# Patient Record
Sex: FEMALE | Race: ASIAN | Employment: OTHER | URBAN - METROPOLITAN AREA
[De-identification: names, ages, dates, MRNs, and addresses within clinical notes are randomized per-mention and may not be internally consistent; named-entity substitution may affect disease eponyms.]

---

## 2018-05-09 ENCOUNTER — HOSPITAL ENCOUNTER (EMERGENCY)
Age: 38
Discharge: HOME | End: 2018-05-09
Attending: EMERGENCY MEDICINE
Payer: MEDICARE

## 2018-05-09 VITALS
BODY MASS INDEX: 25.06 KG/M2 | DIASTOLIC BLOOD PRESSURE: 121 MMHG | HEART RATE: 96 BPM | WEIGHT: 185 LBS | HEIGHT: 72 IN | RESPIRATION RATE: 16 BRPM | SYSTOLIC BLOOD PRESSURE: 173 MMHG | OXYGEN SATURATION: 100 % | TEMPERATURE: 98.9 F

## 2018-05-09 DIAGNOSIS — S46.819A STRAIN OF TRAPEZIUS MUSCLE, UNSPECIFIED LATERALITY, INITIAL ENCOUNTER: Primary | ICD-10-CM
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
## 2018-05-09 PROCEDURE — 99282 EMERGENCY DEPT VISIT SF MDM: CPT
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
## 2018-06-04 ENCOUNTER — OFFICE VISIT (OUTPATIENT)
Dept: FAMILY MEDICINE CLINIC | Age: 38
End: 2018-06-04

## 2018-06-04 VITALS
RESPIRATION RATE: 20 BRPM | HEIGHT: 72 IN | OXYGEN SATURATION: 98 % | TEMPERATURE: 99.2 F | SYSTOLIC BLOOD PRESSURE: 145 MMHG | DIASTOLIC BLOOD PRESSURE: 101 MMHG | HEART RATE: 18 BPM | BODY MASS INDEX: 24.79 KG/M2 | WEIGHT: 183 LBS

## 2018-06-04 DIAGNOSIS — R39.15 URGENCY OF MICTURITION: ICD-10-CM

## 2018-06-04 DIAGNOSIS — E03.2 HYPOTHYROIDISM DUE TO NON-MEDICATION EXOGENOUS SUBSTANCES: ICD-10-CM

## 2018-06-04 DIAGNOSIS — E55.9 HYPOVITAMINOSIS D: ICD-10-CM

## 2018-06-04 DIAGNOSIS — I10 ESSENTIAL HYPERTENSION: Primary | ICD-10-CM

## 2018-06-04 DIAGNOSIS — F17.210 NICOTINE DEPENDENCE, CIGARETTES, UNCOMPLICATED: ICD-10-CM

## 2018-06-04 DIAGNOSIS — M51.36 DDD (DEGENERATIVE DISC DISEASE), LUMBAR: ICD-10-CM

## 2018-06-04 DIAGNOSIS — R53.83 FATIGUE, UNSPECIFIED TYPE: ICD-10-CM

## 2018-06-04 DIAGNOSIS — F32.A DEPRESSION, UNSPECIFIED DEPRESSION TYPE: ICD-10-CM

## 2018-06-04 DIAGNOSIS — Z79.899 ENCOUNTER FOR LONG-TERM (CURRENT) USE OF MEDICATIONS: ICD-10-CM

## 2018-06-04 DIAGNOSIS — E11.65 TYPE 2 DIABETES MELLITUS WITH HYPERGLYCEMIA, WITHOUT LONG-TERM CURRENT USE OF INSULIN (HCC): ICD-10-CM

## 2018-06-04 PROBLEM — E03.9 HYPOTHYROID: Status: ACTIVE | Noted: 2018-06-04

## 2018-06-04 LAB
BILIRUB UR QL STRIP: NEGATIVE
GLUCOSE UR-MCNC: NEGATIVE MG/DL
KETONES P FAST UR STRIP-MCNC: NEGATIVE MG/DL
PH UR STRIP: 5.5 [PH] (ref 4.6–8)
PROT UR QL STRIP: NEGATIVE
SP GR UR STRIP: 1.02 (ref 1–1.03)
UA UROBILINOGEN AMB POC: NORMAL (ref 0.2–1)
URINALYSIS CLARITY POC: CLEAR
URINALYSIS COLOR POC: YELLOW
URINE BLOOD POC: NEGATIVE
URINE LEUKOCYTES POC: NEGATIVE
URINE NITRITES POC: NEGATIVE

## 2018-06-04 RX ORDER — LISINOPRIL AND HYDROCHLOROTHIAZIDE 12.5; 2 MG/1; MG/1
1 TABLET ORAL DAILY
Qty: 30 TAB | Refills: 0 | Status: SHIPPED | OUTPATIENT
Start: 2018-06-04 | End: 2018-11-02 | Stop reason: SDUPTHER

## 2018-06-04 RX ORDER — ASPIRIN 500 MG
500 TABLET, DELAYED RELEASE (ENTERIC COATED) ORAL
COMMUNITY
End: 2018-11-02 | Stop reason: ALTCHOICE

## 2018-06-04 RX ORDER — LEVOTHYROXINE SODIUM 175 UG/1
175 TABLET ORAL
Qty: 30 TAB | Refills: 0 | Status: SHIPPED | OUTPATIENT
Start: 2018-06-04 | End: 2018-07-20 | Stop reason: SDUPTHER

## 2018-06-04 RX ORDER — LISINOPRIL 10 MG/1
10 TABLET ORAL DAILY
Qty: 30 TAB | Refills: 0 | Status: SHIPPED | OUTPATIENT
Start: 2018-06-04 | End: 2018-06-04 | Stop reason: CLARIF

## 2018-06-04 RX ORDER — IBUPROFEN 200 MG
TABLET ORAL
COMMUNITY
End: 2018-11-02 | Stop reason: DRUGHIGH

## 2018-06-04 NOTE — MR AVS SNAPSHOT
[address] [place] [id number] 
[phone] Patient: Adriel Gambino MRN: [id number] QNY:[birth year] Visit Information Date & Time Provider Department Dept. Phone Encounter #  
 6/4/2018  4:15  [address] [facility] [phone] [id number] Follow-up Instructions Return in about 1 week (around 6/11/2018) for to review labs. Upcoming Health Maintenance Date Due Pneumococcal 19-64 Medium Risk (1 of 1 - PPSV23) 9/3/1997 DTaP/Tdap/Td series (1 - Tdap) 9/3/1999 PAP AKA CERVICAL CYTOLOGY 9/3/1999 MEDICARE YEARLY EXAM 4/27/2018 Influenza Age 5 to Adult 8/1/2018 Allergies as of 6/4/2018  Review Complete On: 6/4/2018 By: Alan Jauregui Severity Noted Reaction Type Reactions Tramadol  04/27/2018    Nausea and Vomiting Current Immunizations  Never Reviewed No immunizations on file. Not reviewed this visit You Were Diagnosed With   
  
 Codes Comments Essential hypertension    -  Primary ICD-10-CM: I10 
ICD-9-CM: 401.9 Hypothyroidism due to non-medication exogenous substances     ICD-10-CM: E03.2 ICD-9-CM: 483. 3 Type 2 diabetes mellitus with hyperglycemia, without long-term current use of insulin (HCC)     ICD-10-CM: E11.65 ICD-9-CM: 250.00, 790.29 Nicotine dependence, cigarettes, uncomplicated     JHON-57-LB: F17.210 ICD-9-CM: 305.1 Depression, unspecified depression type     ICD-10-CM: F32.9 ICD-9-CM: 938 DDD (degenerative disc disease), lumbar     ICD-10-CM: M51.36 
ICD-9-CM: 722.52 Fatigue, unspecified type     ICD-10-CM: R53.83 ICD-9-CM: 780.79 Urgency of micturition     ICD-10-CM: R39.15 ICD-9-CM: 430.63 Encounter for long-term (current) use of medications     ICD-10-CM: Z79.899 ICD-9-CM: V58.69 Hypovitaminosis D     ICD-10-CM: E55.9 ICD-9-CM: 268.9 Vitals BP Pulse Temp Resp Height(growth percentile) Weight(growth percentile) (!) 145/101 (BP 1 Location: Left arm, BP Patient Position: Sitting) (!) 18 99.2 °F (37.3 °C) (Oral) 20 6' 1\" (1.854 m) 183 lb (83 kg) SpO2 BMI OB Status Smoking Status 98% 24.14 kg/m2 Hysterectomy Current Every Day Smoker BMI and BSA Data Body Mass Index Body Surface Area  
 24.14 kg/m 2 2.07 m 2 Preferred Pharmacy Pharmacy Name Phone [address] [facility], [address] [phone] Your Updated Medication List  
  
   
This list is accurate as of 6/4/18  5:22 PM.  Always use your most recent med list.  
  
  
  
  
 aspirin  mg tablet Take 500 mg by mouth every six (6) hours as needed for Pain. ibuprofen 200 mg tablet Commonly known as:  MOTRIN Take  by mouth.  
  
 levothyroxine 175 mcg tablet Commonly known as:  SYNTHROID Take 1 Tab by mouth Daily (before breakfast). lisinopril-hydroCHLOROthiazide 20-12.5 mg per tablet Commonly known as:  Anton Maries Take 1 Tab by mouth daily. methocarbamol 750 mg tablet Commonly known as:  ROBAXIN Take 2 Tabs by mouth four (4) times daily. Indications: Muscle Spasm Prescriptions Sent to Pharmacy Refills  
 levothyroxine (SYNTHROID) 175 mcg tablet 0 Sig: Take 1 Tab by mouth Daily (before breakfast). Class: Normal  
 Pharmacy: [address], [address] [facility] Ph #: [phone] Route: Oral  
 lisinopril-hydroCHLOROthiazide (PRINZIDE, ZESTORETIC) 20-12.5 mg per tablet 0 Sig: Take 1 Tab by mouth daily. Class: Normal  
 Pharmacy: [address] [facility] [address] [place] [address], [address] [facility] Ph #: [phone] Route: Oral  
  
We Performed the Following AMB POC URINALYSIS DIP STICK AUTO W/ MICRO [22231 CPT(R)] Follow-up Instructions Return in about 1 week (around 6/11/2018) for to review labs. To-Do List   
 06/04/2018 Lab:  CBC WITH AUTOMATED DIFF   
  
 06/04/2018 Lab:  HEMOGLOBIN A1C WITH EAG   
  
 06/04/2018 Lab:  LIPID PANEL   
  
 06/04/2018 Lab:  METABOLIC PANEL, COMPREHENSIVE   
  
 06/04/2018 Lab:  MICROALBUMIN, UR, RAND W/ MICROALB/CREAT RATIO   
  
 06/04/2018 Lab:  T4, FREE   
  
 06/04/2018 Lab:  TSH 3RD GENERATION   
  
 06/04/2018 Lab:  VITAMIN B12   
  
 06/04/2018 Lab:  VITAMIN D, 25 HYDROXY Patient Instructions Learning About the [address] [facility] [place] Diet What is the Mediterranean diet? The Mediterranean diet is a style of eating rather than a diet plan. It features foods eaten in [place], [place], [place] and [place], and other countries along the [facility]. It emphasizes eating foods like fish, fruits, vegetables, beans, high-fiber breads and whole grains, nuts, and olive oil. This style of eating includes limited red meat, cheese, and sweets. Why choose the Mediterranean diet? A Mediterranean-style diet may improve heart health. It contains more fat than other heart-healthy diets. But the fats are mainly from nuts, unsaturated oils (such as fish oils and olive oil), and certain nut or seed oils (such as canola, soybean, or flaxseed oil). These fats may help protect the heart and blood vessels. How can you get started on the Mediterranean diet? Here are some things you can do to switch to a more Mediterranean way of eating. What to eat · Eat a variety of fruits and vegetables each day, such as grapes, blueberries, tomatoes, broccoli, peppers, figs, olives, spinach, eggplant, beans, lentils, and chickpeas. · Eat a variety of whole-grain foods each day, such as oats, brown rice, and whole wheat bread, pasta, and couscous. · Eat fish at least 2 times a week. Try tuna, salmon, mackerel, lake trout, herring, or sardines. · Eat moderate amounts of low-fat dairy products, such as milk, cheese, or yogurt. · Eat moderate amounts of poultry and eggs. · Choose healthy (unsaturated) fats, such as nuts, olive oil, and certain nut or seed oils like canola, soybean, and flaxseed. · Limit unhealthy (saturated) fats, such as butter, palm oil, and coconut oil. And limit fats found in animal products, such as meat and dairy products made with whole milk. Try to eat red meat only a few times a month in very small amounts. · Limit sweets and desserts to only a few times a week. This includes sugar-sweetened drinks like soda. The Mediterranean diet may also include red wine with your meal-1 glass each day for women and up to 2 glasses a day for men. Tips for eating at home · Use herbs, spices, garlic, lemon zest, and citrus juice instead of salt to add flavor to foods. · Add avocado slices to your sandwich instead of juares. · Have fish for lunch or dinner instead of red meat. Brush the fish with olive oil, and broil or grill it. · Sprinkle your salad with seeds or nuts instead of cheese. · Cook with olive or canola oil instead of butter or oils that are high in saturated fat. · Switch from 2% milk or whole milk to 1% or fat-free milk. · Dip raw vegetables in a vinaigrette dressing or hummus instead of dips made from mayonnaise or sour cream. 
· Have a piece of fruit for dessert instead of a piece of cake. Try baked apples, or have some dried fruit. Tips for eating out · Try broiled, grilled, baked, or poached fish instead of having it fried or breaded. · Ask your  to have your meals prepared with olive oil instead of butter. · Order dishes made with marinara sauce or sauces made from olive oil. Avoid sauces made from cream or mayonnaise. · Choose whole-grain breads, whole wheat pasta and pizza crust, brown rice, beans, and lentils. · Cut back on butter or margarine on bread.  Instead, you can dip your bread in a small amount of olive oil. · Ask for a side salad or grilled vegetables instead of french fries or chips. Where can you learn more? Go to http://ranjan-luis.info/. Enter 291-301-0496 in the search box to learn more about \"Learning About the Mediterranean Diet. \" Current as of: May 12, 2017 Content Version: 11.4 © 1001-1912 [employer]. Care instructions adapted under license by [employer] (which disclaims liability or warranty for this information). If you have questions about a medical condition or this instruction, always ask your healthcare professional. [place] [address] any warranty or liability for your use of this information. DASH Diet: Care Instructions Your Care Instructions The DASH diet is an eating plan that can help lower your blood pressure. DASH stands for Dietary Approaches to Stop Hypertension. Hypertension is high blood pressure. The DASH diet focuses on eating foods that are high in calcium, potassium, and magnesium. These nutrients can lower blood pressure. The foods that are highest in these nutrients are fruits, vegetables, low-fat dairy products, nuts, seeds, and legumes. But taking calcium, potassium, and magnesium supplements instead of eating foods that are high in those nutrients does not have the same effect. The DASH diet also includes whole grains, fish, and poultry. The DASH diet is one of several lifestyle changes your doctor may recommend to lower your high blood pressure. Your doctor may also want you to decrease the amount of sodium in your diet. Lowering sodium while following the DASH diet can lower blood pressure even further than just the DASH diet alone. Follow-up care is a key part of your treatment and safety. Be sure to make and go to all appointments, and call your doctor if you are having problems. It's also a good idea to know your test results and keep a list of the medicines you take. How can you care for yourself at home? Following the DASH diet · Eat 4 to 5 servings of fruit each day. A serving is 1 medium-sized piece of fruit, ½ cup chopped or canned fruit, 1/4 cup dried fruit, or 4 ounces (½ cup) of fruit juice. Choose fruit more often than fruit juice. · Eat 4 to 5 servings of vegetables each day. A serving is 1 cup of lettuce or raw leafy vegetables, ½ cup of chopped or cooked vegetables, or 4 ounces (½ cup) of vegetable juice. Choose vegetables more often than vegetable juice. · Get 2 to 3 servings of low-fat and fat-free dairy each day. A serving is 8 ounces of milk, 1 cup of yogurt, or 1 ½ ounces of cheese. · Eat 6 to 8 servings of grains each day. A serving is 1 slice of bread, 1 ounce of dry cereal, or ½ cup of cooked rice, pasta, or cooked cereal. Try to choose whole-grain products as much as possible. · Limit lean meat, poultry, and fish to 2 servings each day. A serving is 3 ounces, about the size of a deck of cards. · Eat 4 to 5 servings of nuts, seeds, and legumes (cooked dried beans, lentils, and split peas) each week. A serving is 1/3 cup of nuts, 2 tablespoons of seeds, or ½ cup of cooked beans or peas. · Limit fats and oils to 2 to 3 servings each day. A serving is 1 teaspoon of vegetable oil or 2 tablespoons of salad dressing. · Limit sweets and added sugars to 5 servings or less a week. A serving is 1 tablespoon jelly or jam, ½ cup sorbet, or 1 cup of lemonade. · Eat less than 2,300 milligrams (mg) of sodium a day. If you limit your sodium to 1,500 mg a day, you can lower your blood pressure even more. Tips for success · Start small. Do not try to make dramatic changes to your diet all at once. You might feel that you are missing out on your favorite foods and then be more likely to not follow the plan. Make small changes, and stick with them. Once those changes become habit, add a few more changes. · Try some of the following: ¨ Make it a goal to eat a fruit or vegetable at every meal and at snacks. This will make it easy to get the recommended amount of fruits and vegetables each day. ¨ Try yogurt topped with fruit and nuts for a snack or healthy dessert. ¨ Add lettuce, tomato, cucumber, and onion to sandwiches. ¨ Combine a ready-made pizza crust with low-fat mozzarella cheese and lots of vegetable toppings. Try using tomatoes, squash, spinach, broccoli, carrots, cauliflower, and onions. ¨ Have a variety of cut-up vegetables with a low-fat dip as an appetizer instead of chips and dip. ¨ Sprinkle sunflower seeds or chopped almonds over salads. Or try adding chopped walnuts or almonds to cooked vegetables. ¨ Try some vegetarian meals using beans and peas. Add garbanzo or kidney beans to salads. Make burritos and tacos with mashed olson beans or black beans. Where can you learn more? Go to http://ranjanCitygooluis.info/. Enter G478 in the search box to learn more about \"DASH Diet: Care Instructions. \" Current as of: September 21, 2016 Content Version: 11.4 © 4981-7963 [employer]uback. Care instructions adapted under license by [employer] (which disclaims liability or warranty for this information). If you have questions about a medical condition or this instruction, always ask your healthcare professional. [place] [address] any warranty or liability for your use of this information. Learning About Diabetes Food Guidelines Your Care Instructions Meal planning is important to manage diabetes. It helps keep your blood sugar at a target level (which you set with your doctor). You don't have to eat special foods. You can eat what your family eats, including sweets once in a while. But you do have to pay attention to how often you eat and how much you eat of certain foods.  
You may want to work with a dietitian or a certified diabetes educator (CDE) to help you plan meals and snacks. A dietitian or CDE can also help you lose weight if that is one of your goals. What should you know about eating carbs? Managing the amount of carbohydrate (carbs) you eat is an important part of healthy meals when you have diabetes. Carbohydrate is found in many foods. · Learn which foods have carbs. And learn the amounts of carbs in different foods. ¨ Bread, cereal, pasta, and rice have about 15 grams of carbs in a serving. A serving is 1 slice of bread (1 ounce), ½ cup of cooked cereal, or 1/3 cup of cooked pasta or rice. ¨ Fruits have 15 grams of carbs in a serving. A serving is 1 small fresh fruit, such as an apple or orange; ½ of a banana; ½ cup of cooked or canned fruit; ½ cup of fruit juice; 1 cup of melon or raspberries; or 2 tablespoons of dried fruit. ¨ Milk and no-sugar-added yogurt have 15 grams of carbs in a serving. A serving is 1 cup of milk or 2/3 cup of no-sugar-added yogurt. ¨ Starchy vegetables have 15 grams of carbs in a serving. A serving is ½ cup of mashed potatoes or sweet potato; 1 cup winter squash; ½ of a small baked potato; ½ cup of cooked beans; or ½ cup cooked corn or green peas. · Learn how much carbs to eat each day and at each meal. A dietitian or CDE can teach you how to keep track of the amount of carbs you eat. This is called carbohydrate counting. · If you are not sure how to count carbohydrate grams, use the Plate Method to plan meals. It is a good, quick way to make sure that you have a balanced meal. It also helps you spread carbs throughout the day. ¨ Divide your plate by types of foods. Put non-starchy vegetables on half the plate, meat or other protein food on one-quarter of the plate, and a grain or starchy vegetable in the final quarter of the plate.  To this you can add a small piece of fruit and 1 cup of milk or yogurt, depending on how many carbs you are supposed to eat at a meal. 
 · Try to eat about the same amount of carbs at each meal. Do not \"save up\" your daily allowance of carbs to eat at one meal. 
· Proteins have very little or no carbs per serving. Examples of proteins are beef, chicken, turkey, fish, eggs, tofu, cheese, cottage cheese, and peanut butter. A serving size of meat is 3 ounces, which is about the size of a deck of cards. Examples of meat substitute serving sizes (equal to 1 ounce of meat) are 1/4 cup of cottage cheese, 1 egg, 1 tablespoon of peanut butter, and ½ cup of tofu. How can you eat out and still eat healthy? · Learn to estimate the serving sizes of foods that have carbohydrate. If you measure food at home, it will be easier to estimate the amount in a serving of restaurant food. · If the meal you order has too much carbohydrate (such as potatoes, corn, or baked beans), ask to have a low-carbohydrate food instead. Ask for a salad or green vegetables. · If you use insulin, check your blood sugar before and after eating out to help you plan how much to eat in the future. · If you eat more carbohydrate at a meal than you had planned, take a walk or do other exercise. This will help lower your blood sugar. What else should you know? · Limit saturated fat, such as the fat from meat and dairy products. This is a healthy choice because people who have diabetes are at higher risk of heart disease. So choose lean cuts of meat and nonfat or low-fat dairy products. Use olive or canola oil instead of butter or shortening when cooking. · Don't skip meals. Your blood sugar may drop too low if you skip meals and take insulin or certain medicines for diabetes. · Check with your doctor before you drink alcohol. Alcohol can cause your blood sugar to drop too low. Alcohol can also cause a bad reaction if you take certain diabetes medicines. Follow-up care is a key part of your treatment and safety.  Be sure to make and go to all appointments, and call your doctor if you are having problems. It's also a good idea to know your test results and keep a list of the medicines you take. Where can you learn more? Go to http://ranjan-luis.info/. Enter D333 in the search box to learn more about \"Learning About Diabetes Food Guidelines. \" Current as of: March 13, 2017 Content Version: 11.4 © 5864-9602 [employer] International. Care instructions adapted under license by [employer] (which disclaims liability or warranty for this information). If you have questions about a medical condition or this instruction, always ask your healthcare professional. [address] any warranty or liability for your use of this information. Introducing [facility]! [employer] introduces Facile System patient portal. Now you can access parts of your medical record, email your doctor's office, and request medication refills online. 1. In your internet browser, go to [email or web address] 2. Click on the First Time User? Click Here link in the Sign In box. You will see the New Member Sign Up page. 3. Enter your Facile System Access Code exactly as it appears below. You will not need to use this code after youve completed the sign-up process. If you do not sign up before the expiration date, you must request a new code. · Facile System Access Code: Bhumika Hopson Expires: 7/26/2018 11:21 AM 
 
4. Enter the last four digits of your Social Security Number (xxxx) and Date of Birth (mm/dd/yyyy) as indicated and click Submit. You will be taken to the next sign-up page. 5. Create a Itarot ID. This will be your Facile System login ID and cannot be changed, so think of one that is secure and easy to remember. 6. Create a Facile System password. You can change your password at any time. 7. Enter your Password Reset Question and Answer.  This can be used at a later time if you forget your password. 8. Enter your e-mail address. You will receive e-mail notification when new information is available in [address] 9. Click Sign Up. You can now view and download portions of your medical record. 10. Click the Download Summary menu link to download a portable copy of your medical information. If you have questions, please visit the Frequently Asked Questions section of the [employer] website. Remember, [employer] is NOT to be used for urgent needs. For medical emergencies, dial 911. Now available from your iPhone and Android! Please provide this summary of care documentation to your next provider. Your primary care clinician is listed as Phys Other. If you have any questions after today's visit, please call [phone].

## 2018-06-04 NOTE — PROGRESS NOTES
Lilia Stubbs is a  42 y.o. female presents today for office visit for established care. Patient was last seen at [facility] on 5-9-2018    1. Have you been to the ER, urgent care clinic or hospitalized since your last visit? NO    2. Have you seen or consulted any other health care providers outside of the [address] [place] [address] since your last visit (Include any pap smears or colon screening)?  [address] [place] [address]

## 2018-06-04 NOTE — PATIENT INSTRUCTIONS
Learning About the [address] [facility] Diet  What is the Mediterranean diet? The Mediterranean diet is a style of eating rather than a diet plan. It features foods eaten in [place], [place], [place] and [place], and other countries along the [facility]. It emphasizes eating foods like fish, fruits, vegetables, beans, high-fiber breads and whole grains, nuts, and olive oil. This style of eating includes limited red meat, cheese, and sweets. Why choose the Mediterranean diet? A Mediterranean-style diet may improve heart health. It contains more fat than other heart-healthy diets. But the fats are mainly from nuts, unsaturated oils (such as fish oils and olive oil), and certain nut or seed oils (such as canola, soybean, or flaxseed oil). These fats may help protect the heart and blood vessels. How can you get started on the Mediterranean diet? Here are some things you can do to switch to a more Mediterranean way of eating. What to eat  · Eat a variety of fruits and vegetables each day, such as grapes, blueberries, tomatoes, broccoli, peppers, figs, olives, spinach, eggplant, beans, lentils, and chickpeas. · Eat a variety of whole-grain foods each day, such as oats, brown rice, and whole wheat bread, pasta, and couscous. · Eat fish at least 2 times a week. Try tuna, salmon, mackerel, lake trout, herring, or sardines. · Eat moderate amounts of low-fat dairy products, such as milk, cheese, or yogurt. · Eat moderate amounts of poultry and eggs. · Choose healthy (unsaturated) fats, such as nuts, olive oil, and certain nut or seed oils like canola, soybean, and flaxseed. · Limit unhealthy (saturated) fats, such as butter, palm oil, and coconut oil. And limit fats found in animal products, such as meat and dairy products made with whole milk. Try to eat red meat only a few times a month in very small amounts. · Limit sweets and desserts to only a few times a week.  This includes sugar-sweetened drinks like soda. The Mediterranean diet may also include red wine with your meal-1 glass each day for women and up to 2 glasses a day for men. Tips for eating at home  · Use herbs, spices, garlic, lemon zest, and citrus juice instead of salt to add flavor to foods. · Add avocado slices to your sandwich instead of juares. · Have fish for lunch or dinner instead of red meat. Brush the fish with olive oil, and broil or grill it. · Sprinkle your salad with seeds or nuts instead of cheese. · Cook with olive or canola oil instead of butter or oils that are high in saturated fat. · Switch from 2% milk or whole milk to 1% or fat-free milk. · Dip raw vegetables in a vinaigrette dressing or hummus instead of dips made from mayonnaise or sour cream.  · Have a piece of fruit for dessert instead of a piece of cake. Try baked apples, or have some dried fruit. Tips for eating out  · Try broiled, grilled, baked, or poached fish instead of having it fried or breaded. · Ask your  to have your meals prepared with olive oil instead of butter. · Order dishes made with marinara sauce or sauces made from olive oil. Avoid sauces made from cream or mayonnaise. · Choose whole-grain breads, whole wheat pasta and pizza crust, brown rice, beans, and lentils. · Cut back on butter or margarine on bread. Instead, you can dip your bread in a small amount of olive oil. · Ask for a side salad or grilled vegetables instead of french fries or chips. Where can you learn more? Go to http://ranjan-luis.info/. Enter [phone] in the search box to learn more about \"Learning About the Mediterranean Diet. \"  Current as of: May 12, 2017  Content Version: 11.4  © 4968-9032 [employer]. Care instructions adapted under license by [employer] (which disclaims liability or warranty for this information).  If you have questions about a medical condition or this instruction, always ask your healthcare professional. [place], [facility] disclaims any warranty or liability for your use of this information. DASH Diet: Care Instructions  Your Care Instructions    The DASH diet is an eating plan that can help lower your blood pressure. DASH stands for Dietary Approaches to Stop Hypertension. Hypertension is high blood pressure. The DASH diet focuses on eating foods that are high in calcium, potassium, and magnesium. These nutrients can lower blood pressure. The foods that are highest in these nutrients are fruits, vegetables, low-fat dairy products, nuts, seeds, and legumes. But taking calcium, potassium, and magnesium supplements instead of eating foods that are high in those nutrients does not have the same effect. The DASH diet also includes whole grains, fish, and poultry. The DASH diet is one of several lifestyle changes your doctor may recommend to lower your high blood pressure. Your doctor may also want you to decrease the amount of sodium in your diet. Lowering sodium while following the DASH diet can lower blood pressure even further than just the DASH diet alone. Follow-up care is a key part of your treatment and safety. Be sure to make and go to all appointments, and call your doctor if you are having problems. It's also a good idea to know your test results and keep a list of the medicines you take. How can you care for yourself at home? Following the DASH diet  · Eat 4 to 5 servings of fruit each day. A serving is 1 medium-sized piece of fruit, ½ cup chopped or canned fruit, 1/4 cup dried fruit, or 4 ounces (½ cup) of fruit juice. Choose fruit more often than fruit juice. · Eat 4 to 5 servings of vegetables each day. A serving is 1 cup of lettuce or raw leafy vegetables, ½ cup of chopped or cooked vegetables, or 4 ounces (½ cup) of vegetable juice. Choose vegetables more often than vegetable juice. · Get 2 to 3 servings of low-fat and fat-free dairy each day.  A serving is 8 ounces of milk, 1 cup of yogurt, or 1 ½ ounces of cheese. · Eat 6 to 8 servings of grains each day. A serving is 1 slice of bread, 1 ounce of dry cereal, or ½ cup of cooked rice, pasta, or cooked cereal. Try to choose whole-grain products as much as possible. · Limit lean meat, poultry, and fish to 2 servings each day. A serving is 3 ounces, about the size of a deck of cards. · Eat 4 to 5 servings of nuts, seeds, and legumes (cooked dried beans, lentils, and split peas) each week. A serving is 1/3 cup of nuts, 2 tablespoons of seeds, or ½ cup of cooked beans or peas. · Limit fats and oils to 2 to 3 servings each day. A serving is 1 teaspoon of vegetable oil or 2 tablespoons of salad dressing. · Limit sweets and added sugars to 5 servings or less a week. A serving is 1 tablespoon jelly or jam, ½ cup sorbet, or 1 cup of lemonade. · Eat less than 2,300 milligrams (mg) of sodium a day. If you limit your sodium to 1,500 mg a day, you can lower your blood pressure even more. Tips for success  · Start small. Do not try to make dramatic changes to your diet all at once. You might feel that you are missing out on your favorite foods and then be more likely to not follow the plan. Make small changes, and stick with them. Once those changes become habit, add a few more changes. · Try some of the following:  ¨ Make it a goal to eat a fruit or vegetable at every meal and at snacks. This will make it easy to get the recommended amount of fruits and vegetables each day. ¨ Try yogurt topped with fruit and nuts for a snack or healthy dessert. ¨ Add lettuce, tomato, cucumber, and onion to sandwiches. ¨ Combine a ready-made pizza crust with low-fat mozzarella cheese and lots of vegetable toppings. Try using tomatoes, squash, spinach, broccoli, carrots, cauliflower, and onions. ¨ Have a variety of cut-up vegetables with a low-fat dip as an appetizer instead of chips and dip. ¨ Sprinkle sunflower seeds or chopped almonds over salads.  Or try adding chopped walnuts or almonds to cooked vegetables. ¨ Try some vegetarian meals using beans and peas. Add garbanzo or kidney beans to salads. Make burritos and tacos with mashed olson beans or black beans. Where can you learn more? Go to http://ranjan-luis.info/. Enter C062 in the search box to learn more about \"DASH Diet: Care Instructions. \"  Current as of: September 21, 2016  Content Version: 11.4  © 8199-2162 [employer]. Care instructions adapted under license by [employer] (which disclaims liability or warranty for this information). If you have questions about a medical condition or this instruction, always ask your healthcare professional. [address] any warranty or liability for your use of this information. Learning About Diabetes Food Guidelines  Your Care Instructions    Meal planning is important to manage diabetes. It helps keep your blood sugar at a target level (which you set with your doctor). You don't have to eat special foods. You can eat what your family eats, including sweets once in a while. But you do have to pay attention to how often you eat and how much you eat of certain foods. You may want to work with a dietitian or a certified diabetes educator (CDE) to help you plan meals and snacks. A dietitian or CDE can also help you lose weight if that is one of your goals. What should you know about eating carbs? Managing the amount of carbohydrate (carbs) you eat is an important part of healthy meals when you have diabetes. Carbohydrate is found in many foods. · Learn which foods have carbs. And learn the amounts of carbs in different foods. ¨ Bread, cereal, pasta, and rice have about 15 grams of carbs in a serving. A serving is 1 slice of bread (1 ounce), ½ cup of cooked cereal, or 1/3 cup of cooked pasta or rice. ¨ Fruits have 15 grams of carbs in a serving.  A serving is 1 small fresh fruit, such as an apple or orange; ½ of a banana; ½ cup of cooked or canned fruit; ½ cup of fruit juice; 1 cup of melon or raspberries; or 2 tablespoons of dried fruit. ¨ Milk and no-sugar-added yogurt have 15 grams of carbs in a serving. A serving is 1 cup of milk or 2/3 cup of no-sugar-added yogurt. ¨ Starchy vegetables have 15 grams of carbs in a serving. A serving is ½ cup of mashed potatoes or sweet potato; 1 cup winter squash; ½ of a small baked potato; ½ cup of cooked beans; or ½ cup cooked corn or green peas. · Learn how much carbs to eat each day and at each meal. A dietitian or CDE can teach you how to keep track of the amount of carbs you eat. This is called carbohydrate counting. · If you are not sure how to count carbohydrate grams, use the Plate Method to plan meals. It is a good, quick way to make sure that you have a balanced meal. It also helps you spread carbs throughout the day. ¨ Divide your plate by types of foods. Put non-starchy vegetables on half the plate, meat or other protein food on one-quarter of the plate, and a grain or starchy vegetable in the final quarter of the plate. To this you can add a small piece of fruit and 1 cup of milk or yogurt, depending on how many carbs you are supposed to eat at a meal.  · Try to eat about the same amount of carbs at each meal. Do not \"save up\" your daily allowance of carbs to eat at one meal.  · Proteins have very little or no carbs per serving. Examples of proteins are beef, chicken, turkey, fish, eggs, tofu, cheese, cottage cheese, and peanut butter. A serving size of meat is 3 ounces, which is about the size of a deck of cards. Examples of meat substitute serving sizes (equal to 1 ounce of meat) are 1/4 cup of cottage cheese, 1 egg, 1 tablespoon of peanut butter, and ½ cup of tofu. How can you eat out and still eat healthy? · Learn to estimate the serving sizes of foods that have carbohydrate.  If you measure food at home, it will be easier to estimate the amount in a serving of [employer]. · If the meal you order has too much carbohydrate (such as potatoes, corn, or baked beans), ask to have a low-carbohydrate food instead. Ask for a salad or green vegetables. · If you use insulin, check your blood sugar before and after eating out to help you plan how much to eat in the future. · If you eat more carbohydrate at a meal than you had planned, take a walk or do other exercise. This will help lower your blood sugar. What else should you know? · Limit saturated fat, such as the fat from meat and dairy products. This is a healthy choice because people who have diabetes are at higher risk of heart disease. So choose lean cuts of meat and nonfat or low-fat dairy products. Use olive or canola oil instead of butter or shortening when cooking. · Don't skip meals. Your blood sugar may drop too low if you skip meals and take insulin or certain medicines for diabetes. · Check with your doctor before you drink alcohol. Alcohol can cause your blood sugar to drop too low. Alcohol can also cause a bad reaction if you take certain diabetes medicines. Follow-up care is a key part of your treatment and safety. Be sure to make and go to all appointments, and call your doctor if you are having problems. It's also a good idea to know your test results and keep a list of the medicines you take. Where can you learn more? Go to http://ranjan-luis.info/. Enter K204 in the search box to learn more about \"Learning About Diabetes Food Guidelines. \"  Current as of: March 13, 2017  Content Version: 11.4  © 3198-6832 [employer]. Care instructions adapted under license by [employer] (which disclaims liability or warranty for this information). If you have questions about a medical condition or this instruction, always ask your healthcare professional. [address] any warranty or liability for your use of this information.

## 2018-06-04 NOTE — PROGRESS NOTES
Chief Complaint   Patient presents with    Establish Care     NPE       HPI:  Andreea Hopkins is a 42 y.o. female who presents today as a new pt to establish care. Med hx includes HTN, Hypothyroidism, Depression previously on Paxil, lumbar ddd ffled by Ortho and spine at [facility], DM 2, etc. She endorsed occasional fatigue and urinary urgency, but otherwise feels well and denies CP, SOB, orthopnea, PND, fever, chills, headache, dizziness, visual disturbances, syncope, abdominal pain, diarrhea, cough, rectal bleeding, back or flank pain, pain or weakness in extremities, sore throat, ear aches, nausea, vomiting, or other complaints today. She smokes 10 cigarettes daily ongoing for 18 yrs and denies ethanol or illicit drug use. She is  and has one son age 6. She is disabled and receives social security disability b/c of depression and chronic medical problems. She is requesting refill of some medications today. Past Medical History:   Diagnosis Date    Back pain     Cervical paraspinal muscle spasm     Cervical radiculopathy     Chronic hip pain     Diabetes (HCC)     Hypertension     Hypothyroid     Sciatica      Allergies   Allergen Reactions    Tramadol Nausea and Vomiting     Current Outpatient Prescriptions   Medication Sig Dispense Refill    ibuprofen (MOTRIN) 200 mg tablet Take  by mouth.  aspirin  mg tablet Take 500 mg by mouth every six (6) hours as needed for Pain.  levothyroxine (SYNTHROID) 175 mcg tablet Take 1 Tab by mouth Daily (before breakfast). 30 Tab 0    lisinopril-hydroCHLOROthiazide (PRINZIDE, ZESTORETIC) 20-12.5 mg per tablet Take 1 Tab by mouth daily. 30 Tab 0    methocarbamol (ROBAXIN) 750 mg tablet Take 2 Tabs by mouth four (4) times daily.  Indications: Muscle Spasm 40 Tab 0     Past Surgical History:   Procedure Laterality Date    HX CARPAL TUNNEL RELEASE Right     HX HYSTERECTOMY      HX ORTHOPAEDIC      cyst removed right wrist    HX THYROIDECTOMY       Family History   Problem Relation Age of Onset    Diabetes Father      Social History     Social History    Marital status:      Spouse name: N/A    Number of children: N/A    Years of education: N/A     Social History Main Topics    Smoking status: Current Every Day Smoker     Packs/day: 0.50    Smokeless tobacco: Never Used    Alcohol use No    Drug use: No    Sexual activity: Not Asked     Other Topics Concern    None     Social History Narrative           ROS:  Constitutional: Positive for fatigue. Negative for fever or chills. HEENT: Negative for headache, dizziness, visual disturbance, nasal congestion, ear pain, sore throat  Skin: Negative for rash, bruises, lesions  Lungs:  Negative for SOB, cough, mucus production  Heart: Negative for chest pain, chest discomfort  Abdomen: Negative for abdominal pain, N/V, diarrhea, constipation  Genitourinary: Positive for urinary urgency. Negative for pain on urination, blood in urine, vaginal bleeding, vaginal discharge  Neurological: Negative for numbness in extremities, tremors, confusion, speech disturbance, gait imbalance, weakness  Psychological:Negative for depression, mood changes, anxiety, sleep disturbance  Heme: Negative for easy bruisability or bleeding  Endo: Negative for heat or cold intolerance, frequency with urination, or change in appetite  Musculoskeletal; Negative for muscle or joint aches or pain.         Physical Exam:  Visit Vitals    BP (!) 145/101 (BP 1 Location: Left arm, BP Patient Position: Sitting)    Pulse (!) 18    Temp 99.2 °F (37.3 °C) (Oral)    Resp 20    Ht 6' 1\" (1.854 m)    Wt 183 lb (83 kg)    SpO2 98%    BMI 24.14 kg/m2     General: a & o x 3, afebrile, well-nourished, interacting appropriately, in no acute distress  HEENT: head normocephalic and atraumatic, conjuctiva clear, PERRLA, EOMI, nose clear, nasal turbinates with no swelling or erythema, ear canals clear, no erythema or swelling, TM non-bulging and intact, pharynx and tonsils with no erythema or exudates, no tongue swelling  Mouth: oral mucosa moist, normal appearing dentition, no dental caries, no gum swelling, no oral lesions  Skin: color race-appropriate, warm and dry, no rashes , no bruises  Neck: supple, symmetrical, no palpable mass, no thyromegaly, no carotid bruits, no JVD  Respiratory: symmetrical chest expansion, lung sounds clear, good air entry, normal respiratory effort, no wheezes or crackles  Cardiovascular: normal S1S2, regular rate and rhythm, no murmurs, capillary refills < 2 sec, pulses palpable, no edema, no carotid or abdominal bruits   Abdomen: non-distended, normoactive bowel sounds x 4 quadrants, soft, non-tender to palpation, no guarding or rigidity, no hepatomegaly or splenomegaly, no CVA tenderness  Musculoskeletal: normal ROM on all joints, no swelling or deformity, no perilumbar tenderness  Lymphatic: no cervical lymphadenopathy   Neurological: DTRs intact symmetrically and bilaterally, sensation and motor function intact  Psychological: Appropriate mood and affect, no thought disorder         Assessment/Plan:    ICD-10-CM ICD-9-CM    1. Essential hypertension I10 401.9 BP not optimal.  Lisinopril-HCTZ 20-12.5 mg daily started today and Lisinopril discontinued and she was counseled on low salt diet. lisinopril-hydroCHLOROthiazide (PRINZIDE, ZESTORETIC) 20-12.5 mg per tablet      DISCONTINUED: lisinopril (PRINIVIL, ZESTRIL) 10 mg tablet   2. Hypothyroidism due to non-medication exogenous substances E03.2 244.3 levothyroxine (SYNTHROID) 175 mcg tablet   3. Type 2 diabetes mellitus with hyperglycemia, without long-term current use of insulin (HCC) E11.65 250.00 HEMOGLOBIN A1C WITH EAG     790.29 MICROALBUMIN, UR, RAND W/ MICROALB/CREAT RATIO   4. Nicotine dependence, cigarettes, uncomplicated D60.646 026.9 The patient was counseled on the dangers of tobacco use, and was advised to quit.   Reviewed strategies to maximize success, including removing cigarettes and smoking materials from environment and pharmacotherapy (with 6 minutes spent on counseling). 5. Depression, unspecified depression type F32.9 311 Mood stable   6. DDD (degenerative disc disease), lumbar M51.36 722.52 She was advised to continue analgesics as needed   7. Fatigue, unspecified type Y45.65 863.21 METABOLIC PANEL, COMPREHENSIVE      CBC WITH AUTOMATED DIFF      LIPID PANEL      VITAMIN D, 25 HYDROXY      TSH 3RD GENERATION      T4, FREE      VITAMIN B12   8. Urgency of micturition R39.15 788.63 UA today was negative for UTI. AMB POC URINALYSIS DIP STICK AUTO W/ MICRO   9. Encounter for long-term (current) use of medications Z79.899 V58.69 LIPID PANEL      VITAMIN D, 25 HYDROXY      VITAMIN B12   10.  Hypovitaminosis D E55.9 268.9 VITAMIN D, 25 HYDROXY         Orders Placed This Encounter    METABOLIC PANEL, COMPREHENSIVE     Standing Status:   Future     Standing Expiration Date:   6/5/2019    CBC WITH AUTOMATED DIFF     Standing Status:   Future     Standing Expiration Date:   6/5/2019    LIPID PANEL     Standing Status:   Future     Standing Expiration Date:   6/5/2019    VITAMIN D, 25 HYDROXY     Standing Status:   Future     Standing Expiration Date:   5/30/2019    TSH 3RD GENERATION     Standing Status:   Future     Standing Expiration Date:   6/5/2019    T4, FREE     Standing Status:   Future     Standing Expiration Date:   6/5/2019    HEMOGLOBIN A1C WITH EAG     Standing Status:   Future     Standing Expiration Date:   6/5/2019    VITAMIN B12     Standing Status:   Future     Standing Expiration Date:   6/5/2019    MICROALBUMIN, UR, RAND W/ MICROALB/CREAT RATIO     Standing Status:   Future     Standing Expiration Date:   6/5/2019    CBC WITH AUTOMATED DIFF    METABOLIC PANEL, COMPREHENSIVE    LIPID PANEL    MICROALBUMIN, UR, RAND    HEMOGLOBIN A1C WITH EAG    TSH 3RD GENERATION    VITAMIN D, 25 HYDROXY    T4, FREE    VITAMIN B12    AMB POC URINALYSIS DIP STICK AUTO W/ MICRO    ibuprofen (MOTRIN) 200 mg tablet     Sig: Take  by mouth.  aspirin  mg tablet     Sig: Take 500 mg by mouth every six (6) hours as needed for Pain.  DISCONTD: lisinopril (PRINIVIL, ZESTRIL) 10 mg tablet     Sig: Take 1 Tab by mouth daily. Dispense:  30 Tab     Refill:  0    levothyroxine (SYNTHROID) 175 mcg tablet     Sig: Take 1 Tab by mouth Daily (before breakfast). Dispense:  30 Tab     Refill:  0    lisinopril-hydroCHLOROthiazide (PRINZIDE, ZESTORETIC) 20-12.5 mg per tablet     Sig: Take 1 Tab by mouth daily. Dispense:  30 Tab     Refill:  0           Additional Notes: Discussed today's diagnosis, treatment plans. Discussed medication indications and side effects. After Visit Summary: Discussed provided printed patient instructions. Answered questions accordingly.   Follow-up Disposition: In 1 week to review labs      Chele Hall DO, MPH  Internal Medicine

## 2018-06-08 ENCOUNTER — HOSPITAL ENCOUNTER (OUTPATIENT)
Dept: LAB | Age: 38
Discharge: HOME | End: 2018-06-08

## 2018-06-08 PROCEDURE — 99001 SPECIMEN HANDLING PT-LAB: CPT | Performed by: HOSPITALIST

## 2018-06-09 LAB
25(OH)D3+25(OH)D2 SERPL-MCNC: 12.9 NG/ML (ref 30–100)
ALBUMIN SERPL-MCNC: 4.4 G/DL (ref 3.5–5.5)
ALBUMIN/CREAT UR: 4.7 MG/G CREAT (ref 0–30)
ALBUMIN/GLOB SERPL: 1.5 {RATIO} (ref 1.2–2.2)
ALP SERPL-CCNC: 54 IU/L (ref 39–117)
ALT SERPL-CCNC: 11 IU/L (ref 0–32)
AST SERPL-CCNC: 15 IU/L (ref 0–40)
BASOPHILS # BLD AUTO: 0 X10E3/UL (ref 0–0.2)
BASOPHILS NFR BLD AUTO: 0 %
BILIRUB SERPL-MCNC: 0.5 MG/DL (ref 0–1.2)
BUN SERPL-MCNC: 11 MG/DL (ref 6–20)
BUN/CREAT SERPL: 10 (ref 9–23)
CALCIUM SERPL-MCNC: 9.5 MG/DL (ref 8.7–10.2)
CHLORIDE SERPL-SCNC: 102 MMOL/L (ref 96–106)
CHOLEST SERPL-MCNC: 226 MG/DL (ref 100–199)
CO2 SERPL-SCNC: 23 MMOL/L (ref 18–29)
CREAT SERPL-MCNC: 1.06 MG/DL (ref 0.57–1)
CREAT UR-MCNC: 136.9 MG/DL
EOSINOPHIL # BLD AUTO: 0.1 X10E3/UL (ref 0–0.4)
EOSINOPHIL NFR BLD AUTO: 2 %
ERYTHROCYTE [DISTWIDTH] IN BLOOD BY AUTOMATED COUNT: 15.8 % (ref 12.3–15.4)
EST. AVERAGE GLUCOSE BLD GHB EST-MCNC: 120 MG/DL
GFR SERPLBLD CREATININE-BSD FMLA CKD-EPI: 66 ML/MIN/1.73
GFR SERPLBLD CREATININE-BSD FMLA CKD-EPI: 76 ML/MIN/1.73
GLOBULIN SER CALC-MCNC: 2.9 G/DL (ref 1.5–4.5)
GLUCOSE SERPL-MCNC: 132 MG/DL (ref 65–99)
HBA1C MFR BLD: 5.8 % (ref 4.8–5.6)
HCT VFR BLD AUTO: 45.8 % (ref 34–46.6)
HDLC SERPL-MCNC: 58 MG/DL
HGB BLD-MCNC: 14.9 G/DL (ref 11.1–15.9)
IMM GRANULOCYTES # BLD: 0 X10E3/UL (ref 0–0.1)
IMM GRANULOCYTES NFR BLD: 0 %
LDLC SERPL CALC-MCNC: 149 MG/DL (ref 0–99)
LYMPHOCYTES # BLD AUTO: 2.7 X10E3/UL (ref 0.7–3.1)
LYMPHOCYTES NFR BLD AUTO: 39 %
MCH RBC QN AUTO: 27.7 PG (ref 26.6–33)
MCHC RBC AUTO-ENTMCNC: 32.5 G/DL (ref 31.5–35.7)
MCV RBC AUTO: 85 FL (ref 79–97)
MICROALBUMIN UR-MCNC: 6.5 UG/ML
MONOCYTES # BLD AUTO: 0.3 X10E3/UL (ref 0.1–0.9)
MONOCYTES NFR BLD AUTO: 4 %
NEUTROPHILS # BLD AUTO: 3.8 X10E3/UL (ref 1.4–7)
NEUTROPHILS NFR BLD AUTO: 55 %
PLATELET # BLD AUTO: 410 X10E3/UL (ref 150–379)
POTASSIUM SERPL-SCNC: 4.4 MMOL/L (ref 3.5–5.2)
PROT SERPL-MCNC: 7.3 G/DL (ref 6–8.5)
RBC # BLD AUTO: 5.38 X10E6/UL (ref 3.77–5.28)
SODIUM SERPL-SCNC: 138 MMOL/L (ref 134–144)
T4 FREE SERPL-MCNC: 1.14 NG/DL (ref 0.82–1.77)
TRIGL SERPL-MCNC: 93 MG/DL (ref 0–149)
TSH SERPL DL<=0.005 MIU/L-ACNC: 9.07 UIU/ML (ref 0.45–4.5)
VIT B12 SERPL-MCNC: 428 PG/ML (ref 232–1245)
VLDLC SERPL CALC-MCNC: 19 MG/DL (ref 5–40)
WBC # BLD AUTO: 6.9 X10E3/UL (ref 3.4–10.8)

## 2018-06-28 ENCOUNTER — TELEPHONE (OUTPATIENT)
Dept: FAMILY MEDICINE CLINIC | Age: 38
End: 2018-06-28

## 2018-07-20 DIAGNOSIS — E03.2 HYPOTHYROIDISM DUE TO NON-MEDICATION EXOGENOUS SUBSTANCES: ICD-10-CM

## 2018-07-22 RX ORDER — LEVOTHYROXINE SODIUM 175 UG/1
175 TABLET ORAL
Qty: 20 TAB | Refills: 0 | Status: SHIPPED | OUTPATIENT
Start: 2018-07-22 | End: 2018-08-24 | Stop reason: SDUPTHER

## 2018-09-20 ENCOUNTER — TELEPHONE (OUTPATIENT)
Dept: FAMILY MEDICINE CLINIC | Age: 38
End: 2018-09-20

## 2018-10-03 DIAGNOSIS — E03.2 HYPOTHYROIDISM DUE TO NON-MEDICATION EXOGENOUS SUBSTANCES: ICD-10-CM

## 2018-10-17 RX ORDER — IBUPROFEN 200 MG
TABLET ORAL
Status: CANCELLED | OUTPATIENT
Start: 2018-10-17

## 2018-10-17 NOTE — TELEPHONE ENCOUNTER
Requested Prescriptions     Pending Prescriptions Disp Refills    levothyroxine (SYNTHROID) 175 mcg tablet 20 Tab 0

## 2018-10-19 DIAGNOSIS — E03.2 HYPOTHYROIDISM DUE TO NON-MEDICATION EXOGENOUS SUBSTANCES: ICD-10-CM

## 2018-10-22 RX ORDER — LEVOTHYROXINE SODIUM 175 UG/1
TABLET ORAL
Qty: 20 TAB | Refills: 0 | OUTPATIENT
Start: 2018-10-22

## 2018-10-25 NOTE — TELEPHONE ENCOUNTER
Please advise patient she needs to reschedule her appt prior to having medication refilled given I have not seen her previously and she did not come to her follow up visit with me.   Thanks, FREDO AugusteC

## 2018-10-26 DIAGNOSIS — I10 ESSENTIAL HYPERTENSION: ICD-10-CM

## 2018-10-26 RX ORDER — LISINOPRIL AND HYDROCHLOROTHIAZIDE 12.5; 2 MG/1; MG/1
1 TABLET ORAL DAILY
Qty: 30 TAB | Refills: 0 | OUTPATIENT
Start: 2018-10-26

## 2018-10-26 NOTE — TELEPHONE ENCOUNTER
Pt ran out of this medication today.  She has a f/u NP Beth Petties 11/02/2018 and is asking please send in enough to last until her appt if unable to fulfill a full RX

## 2018-10-26 NOTE — TELEPHONE ENCOUNTER
Pt is calling to check the status of her medication she is out of her thyroid medication.   Please advise    [phone]

## 2018-10-26 NOTE — TELEPHONE ENCOUNTER
Requested Prescriptions     Pending Prescriptions Disp Refills    lisinopril-hydroCHLOROthiazide (PRINZIDE, ZESTORETIC) 20-12.5 mg per tablet 30 Tab 0     Sig: Take 1 Tab by mouth daily.

## 2018-10-30 DIAGNOSIS — E03.2 HYPOTHYROIDISM DUE TO NON-MEDICATION EXOGENOUS SUBSTANCES: ICD-10-CM

## 2018-10-31 RX ORDER — LEVOTHYROXINE SODIUM 175 UG/1
TABLET ORAL
Qty: 90 TAB | Refills: 0 | OUTPATIENT
Start: 2018-10-31

## 2018-11-02 ENCOUNTER — TELEPHONE (OUTPATIENT)
Dept: FAMILY MEDICINE CLINIC | Age: 38
End: 2018-11-02

## 2018-11-02 ENCOUNTER — OFFICE VISIT (OUTPATIENT)
Dept: FAMILY MEDICINE CLINIC | Age: 38
End: 2018-11-02

## 2018-11-02 ENCOUNTER — HOSPITAL ENCOUNTER (OUTPATIENT)
Dept: LAB | Age: 38
Discharge: HOME | End: 2018-11-02
Payer: MEDICARE

## 2018-11-02 VITALS
BODY MASS INDEX: 27.12 KG/M2 | WEIGHT: 200.2 LBS | SYSTOLIC BLOOD PRESSURE: 135 MMHG | TEMPERATURE: 98.5 F | OXYGEN SATURATION: 98 % | HEART RATE: 87 BPM | HEIGHT: 72 IN | RESPIRATION RATE: 16 BRPM | DIASTOLIC BLOOD PRESSURE: 89 MMHG

## 2018-11-02 DIAGNOSIS — E55.9 HYPOVITAMINOSIS D: ICD-10-CM

## 2018-11-02 DIAGNOSIS — M25.50 ARTHRALGIA, UNSPECIFIED JOINT: ICD-10-CM

## 2018-11-02 DIAGNOSIS — R73.03 PREDIABETES: ICD-10-CM

## 2018-11-02 DIAGNOSIS — M79.10 MYALGIA: ICD-10-CM

## 2018-11-02 DIAGNOSIS — I10 ESSENTIAL HYPERTENSION: ICD-10-CM

## 2018-11-02 DIAGNOSIS — G89.4 CHRONIC PAIN SYNDROME: ICD-10-CM

## 2018-11-02 DIAGNOSIS — E03.2 HYPOTHYROIDISM DUE TO NON-MEDICATION EXOGENOUS SUBSTANCES: ICD-10-CM

## 2018-11-02 DIAGNOSIS — R53.83 FATIGUE, UNSPECIFIED TYPE: ICD-10-CM

## 2018-11-02 DIAGNOSIS — R53.83 FATIGUE, UNSPECIFIED TYPE: Primary | ICD-10-CM

## 2018-11-02 LAB
CK SERPL-CCNC: 122 U/L (ref 26–192)
ERYTHROCYTE [SEDIMENTATION RATE] IN BLOOD: 4 MM/HR (ref 0–20)
EST. AVERAGE GLUCOSE BLD GHB EST-MCNC: 123 MG/DL
HBA1C MFR BLD: 5.9 % (ref 4.2–5.6)
T4 FREE SERPL-MCNC: 0.7 NG/DL (ref 0.7–1.5)
TSH SERPL DL<=0.05 MIU/L-ACNC: 20 UIU/ML (ref 0.36–3.74)

## 2018-11-02 PROCEDURE — 86225 DNA ANTIBODY NATIVE: CPT | Performed by: NURSE PRACTITIONER

## 2018-11-02 PROCEDURE — 36415 COLL VENOUS BLD VENIPUNCTURE: CPT | Performed by: NURSE PRACTITIONER

## 2018-11-02 PROCEDURE — 86617 LYME DISEASE ANTIBODY: CPT | Performed by: NURSE PRACTITIONER

## 2018-11-02 PROCEDURE — 84439 ASSAY OF FREE THYROXINE: CPT | Performed by: NURSE PRACTITIONER

## 2018-11-02 PROCEDURE — 83036 HEMOGLOBIN GLYCOSYLATED A1C: CPT | Performed by: NURSE PRACTITIONER

## 2018-11-02 PROCEDURE — 84443 ASSAY THYROID STIM HORMONE: CPT | Performed by: NURSE PRACTITIONER

## 2018-11-02 PROCEDURE — 85652 RBC SED RATE AUTOMATED: CPT | Performed by: NURSE PRACTITIONER

## 2018-11-02 PROCEDURE — 82306 VITAMIN D 25 HYDROXY: CPT | Performed by: NURSE PRACTITIONER

## 2018-11-02 PROCEDURE — 82550 ASSAY OF CK (CPK): CPT | Performed by: NURSE PRACTITIONER

## 2018-11-02 PROCEDURE — 83520 IMMUNOASSAY QUANT NOS NONAB: CPT | Performed by: NURSE PRACTITIONER

## 2018-11-02 RX ORDER — METHOCARBAMOL 750 MG/1
750 TABLET, FILM COATED ORAL
Qty: 30 TAB | Refills: 0 | Status: SHIPPED | OUTPATIENT
Start: 2018-11-02 | End: 2018-11-30 | Stop reason: SDUPTHER

## 2018-11-02 RX ORDER — LEVOTHYROXINE SODIUM 175 UG/1
TABLET ORAL
Qty: 30 TAB | Refills: 2 | Status: SHIPPED | OUTPATIENT
Start: 2018-11-02 | End: 2019-01-23 | Stop reason: SDUPTHER

## 2018-11-02 RX ORDER — IBUPROFEN 800 MG/1
800 TABLET ORAL
Qty: 30 TAB | Refills: 0 | Status: SHIPPED | OUTPATIENT
Start: 2018-11-02 | End: 2018-12-24

## 2018-11-02 RX ORDER — LISINOPRIL AND HYDROCHLOROTHIAZIDE 12.5; 2 MG/1; MG/1
1 TABLET ORAL DAILY
Qty: 30 TAB | Refills: 2 | Status: SHIPPED | OUTPATIENT
Start: 2018-11-02 | End: 2019-01-15 | Stop reason: SDUPTHER

## 2018-11-02 NOTE — TELEPHONE ENCOUNTER
[facility] and spoke to the patient. Patient  has muscle aches and muscle spasms. She takes ibuprofen and a muscle relaxant. Saw her primary care doctor and this was supposed to be called in. States that the medication was not called in and requests that we send in medication to her pharmacy. She does request the ibuprofen and a muscle relaxant. From her records I see she has been on methocarbamol in the past.  I will send in both these medications to take up to 3 times a day as needed. Will give 30 pills of each. She should follow-up with her primary care doctor next week.     Vita Davison MD

## 2018-11-02 NOTE — PATIENT INSTRUCTIONS
Fatigue: Care Instructions  Your Care Instructions    Fatigue is a feeling of tiredness, exhaustion, or lack of energy. You may feel fatigue because of too much or not enough activity. It can also come from stress, lack of sleep, boredom, and poor diet. Many medical problems, such as viral infections, can cause fatigue. Emotional problems, especially depression, are often the cause of fatigue. Fatigue is most often a symptom of another problem. Treatment for fatigue depends on the cause. For example, if you have fatigue because you have a certain health problem, treating this problem also treats your fatigue. If depression or anxiety is the cause, treatment may help. Follow-up care is a key part of your treatment and safety. Be sure to make and go to all appointments, and call your doctor if you are having problems. It's also a good idea to know your test results and keep a list of the medicines you take. How can you care for yourself at home? · Get regular exercise. But don't overdo it. Go back and forth between rest and exercise. · Get plenty of rest.  · Eat a healthy diet. Do not skip meals, especially breakfast.  · Reduce your use of caffeine, tobacco, and alcohol. Caffeine is most often found in coffee, tea, cola drinks, and chocolate. · Limit medicines that can cause fatigue. This includes tranquilizers and cold and allergy medicines. When should you call for help? Watch closely for changes in your health, and be sure to contact your doctor if:    · You have new symptoms such as fever or a rash.     · Your fatigue gets worse.     · You have been feeling down, depressed, or hopeless. Or you may have lost interest in things that you usually enjoy.     · You are not getting better as expected. Where can you learn more? Go to http://ranjan-luis.info/. Enter G727 in the search box to learn more about \"Fatigue: Care Instructions. \"  Current as of: November 20, 2017  Content Version: 11.8  © 0676-6110 [employer]. Care instructions adapted under license by [employer] (which disclaims liability or warranty for this information). If you have questions about a medical condition or this instruction, always ask your healthcare professional. [address] any warranty or liability for your use of this information.

## 2018-11-02 NOTE — PROGRESS NOTES
Subjective:   Collins Mak is a 34 y.o. female with hypertension presents for routine follow up. Hypothyroidism ros: denies fatigue, weight changes, heat/cold intolerance, bowel/skin changes or CVS symptoms, change in skin,  nails, or hair. Patient is not compliant with most or all meds. Medication side effects none. Patient states her hair fell out in the crown of her head but this is now growing back. Has not had her medication in 1 week. Patient Active Problem List   Diagnosis Code    Hypertension I5    Hypothyroid E03.9    Hyperglycemia due to type 2 diabetes mellitus ([facility] [address] E11.65    Nicotine dependence, cigarettes, uncomplicated D52.840    Depression F32.9    DDD (degenerative disc disease), lumbar M51.36    Fatigue R53.83    Urgency of micturition R39.15    Encounter for long-term (current) use of medications Z79.899    Hypovitaminosis D E55.9     Current Outpatient Medications   Medication Sig Dispense Refill    levothyroxine (SYNTHROID) 175 mcg tablet TAKE 1 TABLET BY MOUTH ONCE DAILY BEFORE  BREAKFAST 20 Tab 0    ibuprofen (MOTRIN) 200 mg tablet Take  by mouth.  aspirin  mg tablet Take 500 mg by mouth every six (6) hours as needed for Pain.  lisinopril-hydroCHLOROthiazide (PRINZIDE, ZESTORETIC) 20-12.5 mg per tablet Take 1 Tab by mouth daily. 30 Tab 0    methocarbamol (ROBAXIN) 750 mg tablet Take 2 Tabs by mouth four (4) times daily.  Indications: Muscle Spasm 40 Tab 0      Allergies   Allergen Reactions    Tramadol Nausea and Vomiting     Past Surgical History:   Procedure Laterality Date    HX CARPAL TUNNEL RELEASE Right     HX HYSTERECTOMY      HX ORTHOPAEDIC      cyst removed right wrist    HX THYROIDECTOMY       Family History   Problem Relation Age of Onset    Diabetes Father       Lab Results   Component Value Date/Time    Cholesterol, total 226 (H) 06/08/2018 09:27 AM    HDL Cholesterol 58 06/08/2018 09:27 AM    LDL, calculated 149 (H) 06/08/2018 09:27 AM    VLDL, calculated 19 06/08/2018 09:27 AM    Triglyceride 93 06/08/2018 09:27 AM     Lab Results   Component Value Date/Time    Sodium 138 06/08/2018 09:27 AM    Potassium 4.4 06/08/2018 09:27 AM    Chloride 102 06/08/2018 09:27 AM    CO2 23 06/08/2018 09:27 AM    Glucose 132 (H) 06/08/2018 09:27 AM    BUN 11 06/08/2018 09:27 AM    Creatinine 1.06 (H) 06/08/2018 09:27 AM    BUN/Creatinine ratio 10 06/08/2018 09:27 AM    GFR est AA 76 06/08/2018 09:27 AM    GFR est non-AA 66 06/08/2018 09:27 AM    Calcium 9.5 06/08/2018 09:27 AM    Bilirubin, total 0.5 06/08/2018 09:27 AM    AST (SGOT) 15 06/08/2018 09:27 AM    Alk. phosphatase 54 06/08/2018 09:27 AM    Protein, total 7.3 06/08/2018 09:27 AM    Albumin 4.4 06/08/2018 09:27 AM    A-G Ratio 1.5 06/08/2018 09:27 AM    ALT (SGPT) 11 06/08/2018 09:27 AM     Lab Results   Component Value Date/Time    WBC 6.9 06/08/2018 09:27 AM    HGB 14.9 06/08/2018 09:27 AM    HCT 45.8 06/08/2018 09:27 AM    PLATELET 049 (H) 28/90/0814 09:27 AM    MCV 85 06/08/2018 09:27 AM     Wt Readings from Last 3 Encounters:   06/04/18 183 lb (83 kg)   05/09/18 185 lb (83.9 kg)   05/06/18 185 lb (83.9 kg)     Last Point of Care HGB A1C  No results found for: [id number]   BP Readings from Last 3 Encounters:   06/04/18 (!) 145/101   05/09/18 (!) 173/121   05/06/18 (!) 178/113       Hypertension ROS: not taking medications regularly as instructed, no medication side effects noted, no TIA's, no chest pain on exertion, no dyspnea on exertion, no swelling of ankles. States she has been without her blood pressure medication for 2 weeks. Review of Systems - Musculoskeletal ROS: positive for - joint pain and muscle pain    New concerns: patient states she would like to be referred to pain management for her chronic orthopedic pain. Comments she has back spasms, bilateral hip pain, neck pain radiating down to her right arm. She also has morning stiffiness.   Patient reports she has fainting spells where she will faint intermittently since the 9th grade. Comments 2 months ago she was sweating, nausea, ringing in her ears, tunnel vision, reports she couldn't hear and this was new. States in the past she would put her head in between her legs in the past which does help at times. Objective:   Visit Vitals  /89 (BP 1 Location: Left arm)   Pulse 87   Temp 98.5 °F (36.9 °C) (Oral)   Resp 16   Ht 6' 1\" (1.854 m)   Wt 200 lb 3.2 oz (90.8 kg)   SpO2 98%   BMI 26.41 kg/m²      General appearance - alert, well appearing, and in no distress  Neck - supple, no significant adenopathy, carotids upstroke normal bilaterally, no bruits  Chest - clear to auscultation, no wheezes, rales or rhonchi, symmetric air entry  Heart - normal rate, regular rhythm, normal S1, S2, no murmurs, rubs, clicks or gallops  Extremities - peripheral pulses normal, no pedal edema, no clubbing or cyanosis  Abdomen - soft, nontender, nondistended, no masses or organomegaly  Extremities - peripheral pulses normal, no pedal edema, no clubbing or cyanosis    Assessment/Plan:      ICD-10-CM ICD-9-CM    1. Fatigue, unspecified type R53.83 780.79    2. Hypothyroidism due to non-medication exogenous substances E03.2 244.3 levothyroxine (SYNTHROID) 175 mcg tablet   3. Essential hypertension I10 401.9 lisinopril-hydroCHLOROthiazide (PRINZIDE, ZESTORETIC) 20-12.5 mg per tablet   4. Myalgia M79.10 729.1    5. Arthralgia, unspecified joint M25.50 719.40      needs improvement, needs to follow diet more regularly. I have discussed the diagnosis with the patient and the intended plan as seen in the above orders. The patient has received an after-visit summary and questions were answered concerning future plans. I have discussed medication side effects and warnings with the patient as well. Patient agreeable with above plan and verbalizes understanding. Follow-up Disposition:  Return in about 4 weeks (around 11/30/2018) for fatigue .

## 2018-11-03 LAB
25(OH)D3 SERPL-MCNC: 33 NG/ML (ref 30–100)
CENTROMERE B AB SER-ACNC: <0.2 AI (ref 0–0.9)
CHROMATIN AB SERPL-ACNC: <0.2 AI (ref 0–0.9)
DSDNA AB SER-ACNC: <1 IU/ML (ref 0–9)
ENA JO1 AB SER-ACNC: <0.2 AI (ref 0–0.9)
ENA RNP AB SER-ACNC: <0.2 AI (ref 0–0.9)
ENA SCL70 AB SER-ACNC: <0.2 AI (ref 0–0.9)
ENA SM AB SER-ACNC: <0.2 AI (ref 0–0.9)
ENA SS-A AB SER-ACNC: <0.2 AI (ref 0–0.9)
ENA SS-B AB SER-ACNC: <0.2 AI (ref 0–0.9)
SEE BELOW, 164869: NORMAL

## 2018-11-05 RX ORDER — LEVOTHYROXINE SODIUM 25 UG/1
25 TABLET ORAL
Qty: 30 TAB | Refills: 1 | Status: SHIPPED | OUTPATIENT
Start: 2018-11-05 | End: 2019-05-14 | Stop reason: SDUPTHER

## 2018-11-05 NOTE — PROGRESS NOTES
Please advise patient her thyroid levels are elevated. I would like her to begin taking synthroid 200 mcg every morning. I have sent over synthroid 25 mcg since I just refilled her synthroid 175 mcg. All other labs are normal/stable. I am still awaiting 2 additional lab results, rheumatoid factor and lyme titers.   Thanks, FREDO HammondC

## 2018-11-06 LAB
B BURGDOR IGG PATRN SER IB-IMP: NEGATIVE
B BURGDOR IGM PATRN SER IB-IMP: POSITIVE
B BURGDOR18KD IGG SER QL IB: ABNORMAL
B BURGDOR23KD IGG SER QL IB: ABNORMAL
B BURGDOR23KD IGM SER QL IB: PRESENT
B BURGDOR28KD IGG SER QL IB: ABNORMAL
B BURGDOR30KD IGG SER QL IB: ABNORMAL
B BURGDOR39KD IGG SER QL IB: ABNORMAL
B BURGDOR39KD IGM SER QL IB: PRESENT
B BURGDOR41KD IGG SER QL IB: ABNORMAL
B BURGDOR41KD IGM SER QL IB: ABNORMAL
B BURGDOR45KD IGG SER QL IB: ABNORMAL
B BURGDOR58KD IGG SER QL IB: ABNORMAL
B BURGDOR66KD IGG SER QL IB: ABNORMAL
B BURGDOR93KD IGG SER QL IB: ABNORMAL

## 2018-11-06 RX ORDER — DOXYCYCLINE 100 MG/1
100 TABLET ORAL 2 TIMES DAILY
Qty: 20 TAB | Refills: 0 | Status: SHIPPED | OUTPATIENT
Start: 2018-11-06 | End: 2018-11-07

## 2018-11-06 NOTE — PROGRESS NOTES
Please advise patient her lyme titer returned positive for lyme disease. I have sent over doxycyline to take as directed.   Thanks, Terra Horne, NP-C

## 2018-11-06 NOTE — PROGRESS NOTES
Pt aware of labs to add synthroid 25mcg to 175mcg. Aware of Lyme disease & need to  doxycycline at her pharmacy. Aware we are waiting on one additional lab results & will contact her once resulted.

## 2018-11-12 ENCOUNTER — TELEPHONE (OUTPATIENT)
Dept: FAMILY MEDICINE CLINIC | Age: 38
End: 2018-11-12

## 2018-11-12 NOTE — TELEPHONE ENCOUNTER
Pt is calling b/c she tried to make an appt with the doctor on her referral and they are not seeing new patients so she needs a new referral somewhere else. [phone]  Made Pt aware that there is a time frame of 24 to 48 hours to hear a response back.

## 2018-11-15 LAB
14.3.3 ETA, RHEUM. ARTHRITIS: <0.2 NG/ML
CCP IGA+IGG SERPL IA-ACNC: <20 UNITS
RHEUMATOID FACT SERPL-ACNC: <14 UNITS/ML

## 2018-11-30 NOTE — TELEPHONE ENCOUNTER
Requested Prescriptions     Pending Prescriptions Disp Refills    methocarbamol (ROBAXIN) 750 mg tablet 30 Tab 0     Sig: Take 1 Tab by mouth three (3) times daily as needed. Please route to Timothy Olivia, per pt Monique Pollock is her pcp. She stated the medication was called in by the on call provider 11/02/2018.  She said it is a muscle relaxer and she needs a refill

## 2018-12-04 RX ORDER — METHOCARBAMOL 750 MG/1
750 TABLET, FILM COATED ORAL
Qty: 90 TAB | Refills: 0 | Status: SHIPPED | OUTPATIENT
Start: 2018-12-04 | End: 2018-12-24

## 2018-12-24 ENCOUNTER — OFFICE VISIT (OUTPATIENT)
Dept: PAIN MANAGEMENT | Age: 38
End: 2018-12-24

## 2018-12-24 VITALS
HEART RATE: 90 BPM | RESPIRATION RATE: 22 BRPM | HEIGHT: 72 IN | BODY MASS INDEX: 26.41 KG/M2 | SYSTOLIC BLOOD PRESSURE: 134 MMHG | TEMPERATURE: 98.2 F | OXYGEN SATURATION: 98 % | DIASTOLIC BLOOD PRESSURE: 93 MMHG

## 2018-12-24 DIAGNOSIS — M79.18 BILATERAL MYOFASCIAL PAIN: ICD-10-CM

## 2018-12-24 DIAGNOSIS — M51.36 LUMBAR DEGENERATIVE DISC DISEASE: ICD-10-CM

## 2018-12-24 DIAGNOSIS — M70.62 TROCHANTERIC BURSITIS OF BOTH HIPS: ICD-10-CM

## 2018-12-24 DIAGNOSIS — M54.2 CERVICALGIA: ICD-10-CM

## 2018-12-24 DIAGNOSIS — M70.61 TROCHANTERIC BURSITIS OF BOTH HIPS: ICD-10-CM

## 2018-12-24 DIAGNOSIS — M79.18 PIRIFORMIS MUSCLE PAIN: ICD-10-CM

## 2018-12-24 DIAGNOSIS — M46.1 SACROILIITIS (HCC): Primary | ICD-10-CM

## 2018-12-24 RX ORDER — CELECOXIB 100 MG/1
100 CAPSULE ORAL 2 TIMES DAILY
Qty: 30 CAP | Refills: 0 | Status: SHIPPED | OUTPATIENT
Start: 2018-12-24 | End: 2019-01-08

## 2018-12-24 RX ORDER — CYCLOBENZAPRINE HCL 10 MG
10 TABLET ORAL
Qty: 90 TAB | Refills: 3 | Status: SHIPPED | OUTPATIENT
Start: 2018-12-24 | End: 2019-01-23

## 2018-12-24 RX ORDER — ACETAMINOPHEN 500 MG
1000 TABLET ORAL
COMMUNITY
End: 2020-08-17
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
## 2018-12-24 NOTE — PROGRESS NOTES
Nursing Notes    Patient presents to the office today for a new pt consult with Dr. Ish Dumas for her chronic back and joint pain. Patient rates her pain at 8/10 on the numerical pain scale. Last opioid agreement N/A  Last urine drug screen N/A    Comments:     POC UDS was not performed in office today    Any new labs or imaging since last appointment? NO    Have you been to an emergency room (ER) or urgent care clinic since your last visit? YES. Pt states that she went to the ER at [facility] for GI upset          Have you been hospitalized since your last visit? NO     If yes, where, when, and reason for visit? Have you seen or consulted any other health care providers outside of the [address] [place] [address]  since your last visit? YES     If yes, where, when, and reason for visit? pcp  Ms. Zeny Heller has a reminder for a \"due or due soon\" health maintenance. I have asked that she contact her primary care provider for follow-up on this health maintenance. PHQ over the last two weeks 12/24/2018   Little interest or pleasure in doing things Not at all   Feeling down, depressed, irritable, or hopeless Not at all   Total Score PHQ 2 0     Fall Risk Assessment, last 12 mths 12/24/2018   Able to walk? Yes   Fall in past 12 months? No     The pt does not have an advanced medical directive, POA, or living will. She is not interested in discussing this today.

## 2018-12-24 NOTE — PATIENT INSTRUCTIONS
Learning About Benefits From Quitting Smoking  How does quitting smoking make you healthier? If you're thinking about quitting smoking, you may have a few reasons to be smoke-free. Your health may be one of them. · When you quit smoking, you lower your risks for cancer, lung disease, heart attack, stroke, blood vessel disease, and blindness from macular degeneration. · When you're smoke-free, you get sick less often, and you heal faster. You are less likely to get colds, flu, bronchitis, and pneumonia. · As a nonsmoker, you may find that your mood is better and you are less stressed. When and how will you feel healthier? Quitting has real health benefits that start from day 1 of being smoke-free. And the longer you stay smoke-free, the healthier you get and the better you feel. The first hours  · After just 20 minutes, your blood pressure and heart rate go down. That means there's less stress on your heart and blood vessels. · Within 12 hours, the level of carbon monoxide in your blood drops back to normal. That makes room for more oxygen. With more oxygen in your body, you may notice that you have more energy than when you smoked. After 2 weeks  · Your lungs start to work better. · Your risk of heart attack starts to drop. After 1 month  · When your lungs are clear, you cough less and breathe deeper, so it's easier to be active. · Your sense of taste and smell return. That means you can enjoy food more than you have since you started smoking. Over the years  · After 1 year, your risk of heart disease is half what it would be if you kept smoking. · After 5 years, your risk of stroke starts to shrink. Within a few years after that, it's about the same as if you'd never smoked. · After 10 years, your risk of dying from lung cancer is cut by about half. And your risk for many other types of cancer is lower too. How would quitting help others in your life?   When you quit smoking, you improve the health of everyone who now breathes in your smoke. · Their heart, lung, and cancer risks drop, much like yours. · They are sick less. For babies and small children, living smoke-free means they're less likely to have ear infections, pneumonia, and bronchitis. · If you're a woman who is or will be pregnant someday, quitting smoking means a healthier . · Children who are close to you are less likely to become adult smokers. Where can you learn more? Go to http://ranjan-luis.info/. Enter [id number] [id number] [id number] in the search box to learn more about \"Learning About Benefits From Quitting Smoking. \"  Current as of: 2017  Content Version: 11.8  © 3636-3743 [employer]. Care instructions adapted under license by [employer] (which disclaims liability or warranty for this information). If you have questions about a medical condition or this instruction, always ask your healthcare professional. [place] [address] any warranty or liability for your use of this information. Piriformis Syndrome: Exercises  Your Care Instructions  Here are some examples of typical rehabilitation exercises for your condition. Start each exercise slowly. Ease off the exercise if you start to have pain. Your doctor or physical therapist will tell you when you can start these exercises and which ones will work best for you. How to do the exercises  Hip rotator stretch    1. Lie on your back with both knees bent and your feet flat on the floor. 2. Put the ankle of your affected leg on your opposite thigh near your knee. 3. Use your hand to gently push your knee (on your affected leg) away from your body until you feel a gentle stretch around your hip. 4. Hold the stretch for 15 to 30 seconds. 5. Repeat 2 to 4 times. 6. Switch legs and repeat steps 1 through 5. Piriformis stretch    1. Lie on your back with your legs straight.   2. Lift your affected leg and bend your knee. With your opposite hand, reach across your body, and then gently pull your knee toward your opposite shoulder. 3. Hold the stretch for 15 to 30 seconds. 4. Repeat with your other leg. 5. Repeat 2 to 4 times on each side. Lower abdominal strengthening    1. Lie on your back with your knees bent and your feet flat on the floor. 2. Tighten your belly muscles by pulling your belly button in toward your spine. 3. Lift one foot off the floor and bring your knee toward your chest, so that your knee is straight above your hip and your leg is bent like the letter \"L. \"  4. Lift the other knee up to the same position. 5. Lower one leg at a time to the starting position. 6. Keep alternating legs until you have lifted each leg 8 to 12 times. 7. Be sure to keep your belly muscles tight and your back still as you are moving your legs. Be sure to breathe normally. Follow-up care is a key part of your treatment and safety. Be sure to make and go to all appointments, and call your doctor if you are having problems. It's also a good idea to know your test results and keep a list of the medicines you take. Where can you learn more? Go to http://ranjan-luis.info/. Enter Q472 in the search box to learn more about \"Piriformis Syndrome: Exercises. \"  Current as of: November 29, 2017  Content Version: 11.8  © 9046-1678 [employer]wise[employer] Incorporated. Care instructions adapted under license by [employer] (which disclaims liability or warranty for this information). If you have questions about a medical condition or this instruction, always ask your healthcare professional. [address] any warranty or liability for your use of this information.

## 2019-01-10 ENCOUNTER — OFFICE VISIT (OUTPATIENT)
Dept: FAMILY MEDICINE CLINIC | Age: 39
End: 2019-01-10

## 2019-01-10 VITALS
HEART RATE: 97 BPM | RESPIRATION RATE: 20 BRPM | OXYGEN SATURATION: 97 % | SYSTOLIC BLOOD PRESSURE: 117 MMHG | HEIGHT: 72 IN | DIASTOLIC BLOOD PRESSURE: 77 MMHG | TEMPERATURE: 97.9 F | WEIGHT: 205 LBS | BODY MASS INDEX: 27.77 KG/M2

## 2019-01-10 DIAGNOSIS — J01.80 ACUTE NON-RECURRENT SINUSITIS OF OTHER SINUS: Primary | ICD-10-CM

## 2019-01-10 DIAGNOSIS — R55 SYNCOPE, UNSPECIFIED SYNCOPE TYPE: ICD-10-CM

## 2019-01-10 RX ORDER — AZITHROMYCIN 250 MG/1
TABLET, FILM COATED ORAL
Qty: 6 TAB | Refills: 0 | Status: SHIPPED | OUTPATIENT
Start: 2019-01-10 | End: 2019-03-25 | Stop reason: ALTCHOICE

## 2019-01-10 NOTE — PATIENT INSTRUCTIONS
Sinusitis: Care Instructions  Your Care Instructions    Sinusitis is an infection of the lining of the sinus cavities in your head. Sinusitis often follows a cold. It causes pain and pressure in your head and face. In most cases, sinusitis gets better on its own in 1 to 2 weeks. But some mild symptoms may last for several weeks. Sometimes antibiotics are needed. Follow-up care is a key part of your treatment and safety. Be sure to make and go to all appointments, and call your doctor if you are having problems. It's also a good idea to know your test results and keep a list of the medicines you take. How can you care for yourself at home? · Take an over-the-counter pain medicine, such as acetaminophen (Tylenol), ibuprofen (Advil, Motrin), or naproxen (Aleve). Read and follow all instructions on the label. · If the doctor prescribed antibiotics, take them as directed. Do not stop taking them just because you feel better. You need to take the full course of antibiotics. · Be careful when taking over-the-counter cold or flu medicines and Tylenol at the same time. Many of these medicines have acetaminophen, which is Tylenol. Read the labels to make sure that you are not taking more than the recommended dose. Too much acetaminophen (Tylenol) can be harmful. · Breathe warm, moist air from a steamy shower, a hot bath, or a sink filled with hot water. Avoid cold, dry air. Using a humidifier in your home may help. Follow the directions for cleaning the machine. · Use saline (saltwater) nasal washes to help keep your nasal passages open and wash out mucus and bacteria. You can buy saline nose drops at a grocery store or drugstore. Or you can make your own at home by adding 1 teaspoon of salt and 1 teaspoon of baking soda to 2 cups of distilled water. If you make your own, fill a bulb syringe with the solution, insert the tip into your nostril, and squeeze gently. Michell Pena your nose.   · Put a hot, wet towel or a warm gel pack on your face 3 or 4 times a day for 5 to 10 minutes each time. · Try a decongestant nasal spray like oxymetazoline (Afrin). Do not use it for more than 3 days in a row. Using it for more than 3 days can make your congestion worse. When should you call for help? Call your doctor now or seek immediate medical care if:    · You have new or worse swelling or redness in your face or around your eyes.     · You have a new or higher fever.    Watch closely for changes in your health, and be sure to contact your doctor if:    · You have new or worse facial pain.     · The mucus from your nose becomes thicker (like pus) or has new blood in it.     · You are not getting better as expected. Where can you learn more? Go to http://ranjan-luis.info/. Enter D223 in the search box to learn more about \"Sinusitis: Care Instructions. \"  Current as of: March 28, 2018  Content Version: 11.8  © 6534-8228 Healthwise, Incorporated. Care instructions adapted under license by [employer] (which disclaims liability or warranty for this information). If you have questions about a medical condition or this instruction, always ask your healthcare professional. [place] [address] any warranty or liability for your use of this information.

## 2019-01-10 NOTE — PROGRESS NOTES
1. Have you been to the ER, urgent care clinic since your last visit? Hospitalized since your last visit? Yes  - [facility]    2. Have you seen or consulted any other health care providers outside of the [address] [place] [address] since your last visit? Include any pap smears or colon screening.  No

## 2019-01-10 NOTE — PROGRESS NOTES
HISTORY OF PRESENT ILLNESS  Brayan Davey is a 34 y.o. female. HPI  Patient is here today for evaluation and treatment of: Nasal Congestion    Nasal Congestion: has had nasal congestion and cough X 2 weeks; She has had no fever ; has had sore throat; She has had some sick contacts; No ear pain; Has been using dayquil and nyquil  And this has not helped so much. Report some dizzy spells since she has been in 9th grade;  Usually occurs when she gets up to go to a different room. She has tinnitus, nausea and gets hot. She may see colored  spots. She has had syncopal episodes. State sthat she feels sweaty, nausea prior to the syncopal episode. She has \" wierdness \" in her chest at times;; she denies pain. Of note, pt had 25 mcg of synthroid added to her regimen but she has not gotten the refill on this rx. .  Pt advised to get rx. She has follow up on 1/21 with NP , Brittnee Hurst    PMH,  Meds, Allergies, Family History, Social history reviewed       Current Outpatient Medications:     azithromycin (ZITHROMAX) 250 mg tablet, 2 tabs po on Day #1 and then 1 tab PO daily on Days #2-5, Disp: 6 Tab, Rfl: 0    acetaminophen (TYLENOL EXTRA STRENGTH) 500 mg tablet, Take 500 mg by mouth two (2) times daily as needed for Pain., Disp: , Rfl:     cyclobenzaprine (FLEXERIL) 10 mg tablet, Take 1 Tab by mouth three (3) times daily as needed for Muscle Spasm(s) for up to 30 days. , Disp: 90 Tab, Rfl: 3    levothyroxine (SYNTHROID) 25 mcg tablet, Take 1 Tab by mouth Daily (before breakfast). , Disp: 30 Tab, Rfl: 1    levothyroxine (SYNTHROID) 175 mcg tablet, TAKE 1 TABLET BY MOUTH ONCE DAILY BEFORE  BREAKFAST, Disp: 30 Tab, Rfl: 2    lisinopril-hydroCHLOROthiazide (PRINZIDE, ZESTORETIC) 20-12.5 mg per tablet, Take 1 Tab by mouth daily. , Disp: 30 Tab, Rfl: 2      PMH,  Meds, Allergies, Family History, Social history reviewed    Review of Systems   Constitutional: Negative for fever.    Cardiovascular: Negative for chest pain and palpitations. Physical Exam   Constitutional: She appears well-developed and well-nourished. No distress. HENT:   Right Ear: Tympanic membrane normal. Tympanic membrane is not injected. Left Ear: Tympanic membrane normal. Tympanic membrane is not injected. Nose: No mucosal edema or rhinorrhea. Mouth/Throat: No oropharyngeal exudate or posterior oropharyngeal edema. Neck: Neck supple. No thyromegaly present. Cardiovascular: Normal rate and regular rhythm. Exam reveals no gallop and no friction rub. No murmur heard. Pulmonary/Chest: Breath sounds normal. No respiratory distress. She has no wheezes. She has no rales. Musculoskeletal: She exhibits no edema. Nursing note and vitals reviewed. Visit Vitals  /77 (BP 1 Location: Left arm, BP Patient Position: Sitting)   Pulse 97   Temp 97.9 °F (36.6 °C) (Oral)   Resp 20   Ht 6' 1\" (1.854 m)   Wt 205 lb (93 kg)   SpO2 97%   BMI 27.05 kg/m²       ASSESSMENT and PLAN    ICD-10-CM ICD-9-CM    1. Acute non-recurrent sinusitis of other sinus J01.80 461.8    2. Syncope, unspecified syncope type R55 780.2 REFERRAL TO NEUROLOGY       As above, #1 new   treatment plan as listed below  Orders Placed This Encounter    REFERRAL TO NEUROLOGY    azithromycin (ZITHROMAX) 250 mg tablet     Follow-up Disposition:  Return 1/21 as scheduled. An After Visit Summary was printed and given to the patient. This has been fully explained to the patient, who indicates understanding.

## 2019-01-15 DIAGNOSIS — I10 ESSENTIAL HYPERTENSION: ICD-10-CM

## 2019-01-15 RX ORDER — INSULIN PUMP SYRINGE, 3 ML
EACH MISCELLANEOUS
OUTPATIENT
Start: 2019-01-15

## 2019-01-15 RX ORDER — LISINOPRIL AND HYDROCHLOROTHIAZIDE 12.5; 2 MG/1; MG/1
1 TABLET ORAL DAILY
Qty: 90 TAB | Refills: 1 | Status: SHIPPED | OUTPATIENT
Start: 2019-01-15 | End: 2019-08-26 | Stop reason: SDUPTHER

## 2019-01-15 NOTE — TELEPHONE ENCOUNTER
Please ask patient what brand of glucometer she has. A request for glucose controls for her glucometer was requested from [facility]   Thanks, FREDO StockC

## 2019-01-17 ENCOUNTER — TELEPHONE (OUTPATIENT)
Dept: FAMILY MEDICINE CLINIC | Age: 39
End: 2019-01-17

## 2019-01-17 NOTE — TELEPHONE ENCOUNTER
Pt states she saw Dr Michelle Son  On 1/10/19 for sinus infection & cough and has been using OTC cough medicine but it is not helping & would like Dr Michelle Son to prescribe a cough medicine for her.

## 2019-01-23 DIAGNOSIS — E03.2 HYPOTHYROIDISM DUE TO NON-MEDICATION EXOGENOUS SUBSTANCES: ICD-10-CM

## 2019-01-23 NOTE — TELEPHONE ENCOUNTER
Requested Prescriptions     Pending Prescriptions Disp Refills    levothyroxine (SYNTHROID) 175 mcg tablet 30 Tab 2     Sig: TAKE 1 TABLET BY MOUTH ONCE DAILY BEFORE  BREAKFAST

## 2019-01-25 RX ORDER — LEVOTHYROXINE SODIUM 175 UG/1
TABLET ORAL
Qty: 30 TAB | Refills: 2 | Status: SHIPPED | OUTPATIENT
Start: 2019-01-25 | End: 2019-05-14 | Stop reason: SDUPTHER

## 2019-02-07 ENCOUNTER — DOCUMENTATION ONLY (OUTPATIENT)
Dept: NEUROLOGY | Age: 39
End: 2019-02-07

## 2019-02-12 ENCOUNTER — HOSPITAL ENCOUNTER (EMERGENCY)
Age: 39
Discharge: HOME | End: 2019-02-12
Attending: EMERGENCY MEDICINE
Payer: MEDICARE

## 2019-02-12 VITALS
DIASTOLIC BLOOD PRESSURE: 89 MMHG | RESPIRATION RATE: 16 BRPM | TEMPERATURE: 97.6 F | OXYGEN SATURATION: 100 % | SYSTOLIC BLOOD PRESSURE: 126 MMHG | HEART RATE: 96 BPM

## 2019-02-12 DIAGNOSIS — L50.9 HIVES: Primary | ICD-10-CM

## 2019-02-12 PROCEDURE — 99282 EMERGENCY DEPT VISIT SF MDM: CPT

## 2019-02-12 RX ORDER — PREDNISONE 20 MG/1
60 TABLET ORAL DAILY
Qty: 15 TAB | Refills: 0 | Status: SHIPPED | OUTPATIENT
Start: 2019-02-12 | End: 2019-02-17

## 2019-02-12 NOTE — DISCHARGE INSTRUCTIONS
Patient Education        Hives: Care Instructions  Your Care Instructions  Hives are raised, red, itchy patches of skin. They are also called wheals or welts. They usually have red borders and pale centers. Hives range in size from ¼ inch to 3 inches or more across. They may seem to move from place to place on the skin. Several hives may form a large area of raised, red skin. You can get hives after an insect sting, after taking medicine or eating certain foods, or because of infection or stress. Other causes include plants, things you breathe in, makeup, heat, cold, sunlight, and latex. You cannot spread hives to other people. Hives may last a few minutes or a few days, but a single spot may last less than 36 hours. Follow-up care is a key part of your treatment and safety. Be sure to make and go to all appointments, and call your doctor if you are having problems. It's also a good idea to know your test results and keep a list of the medicines you take. How can you care for yourself at home? · Avoid whatever you think may have caused your hives, such as a certain food or medicine. However, you may not know the cause. · Put a cool, wet towel on the area to relieve itching. · Take an over-the-counter antihistamine, such as diphenhydramine (Benadryl), cetirizine (Zyrtec), or loratadine (Claritin), to help stop the hives and calm the itching. Read and follow directions on the label. These medicines can make you feel sleepy. Do not drive while using them. · Stay away from strong soaps, detergents, and chemicals. These can make itching worse. When should you call for help? Call 911 anytime you think you may need emergency care. For example, call if:    · You have symptoms of a severe allergic reaction. These may include:  ? Sudden raised, red areas (hives) all over your body. ? Swelling of the throat, mouth, lips, or tongue. ? Trouble breathing. ? Passing out (losing consciousness).  Or you may feel very lightheaded or suddenly feel weak, confused, or restless.    Call your doctor now or seek immediate medical care if:    · You have symptoms of an allergic reaction, such as:  ? A rash or hives (raised, red areas on the skin). ? Itching. ? Swelling. ? Belly pain, nausea, or vomiting.     · You get hives after you start a new medicine.     · Hives have not gone away after 24 hours.    Watch closely for changes in your health, and be sure to contact your doctor if:    · You do not get better as expected. Where can you learn more? Go to http://ranjan-luis.info/. Enter A752 in the search box to learn more about \"Hives: Care Instructions. \"  Current as of: September 23, 2018  Content Version: 11.9  © 0114-3663 [employer]. Care instructions adapted under license by [employer] (which disclaims liability or warranty for this information). If you have questions about a medical condition or this instruction, always ask your healthcare professional. [address] any warranty or liability for your use of this information.

## 2019-02-12 NOTE — ED PROVIDER NOTES
EMERGENCY DEPARTMENT HISTORY AND PHYSICAL EXAM 
 
3:20 PM 
 
 
Date: 2/12/2019 Patient Name: Julio Turner History of Presenting Illness Chief Complaint Patient presents with  Rash History Provided By: Patient Chief Complaint: rash Duration:  Days Timing:  Constant Location: abdomen and LE Quality: itchy Severity: Mild Modifying Factors: none Associated Symptoms: denies any other associated signs or symptoms Additional History (Context): Julio Turner is a 34 y.o. female with h/o HTN, DM, asthma, and sickle cell trait who presents with diffuse pruritic rash to her abdomen and bilat LE for last 1-2 days that has remained constant. Nothing at home for sx. No changes in soaps, detergents, or new clothing. Denies SOB, fever, chills, throat swelling, facial swelling, choking, drooling, trouble swallowing, N/V, or any other associated sx. No other complaints or concerns. PCP: Oswaldo Desai NP Current Outpatient Medications Medication Sig Dispense Refill  predniSONE (DELTASONE) 20 mg tablet Take 60 mg by mouth daily for 5 days. With Breakfast 15 Tab 0  
 levothyroxine (SYNTHROID) 175 mcg tablet TAKE 1 TABLET BY MOUTH ONCE DAILY BEFORE  BREAKFAST 30 Tab 2  Blood Glucose Control, High (EMBRACE GLUCOSE CONTROL HIGH) soln Use with glucometer as directed. 3 Each 3  Blood Glucose Control, Low (EMBRACE GLUCOSE CONTROL LOW) soln Use with glucometer as directed. 3 Each 3  
 lisinopril-hydroCHLOROthiazide (PRINZIDE, ZESTORETIC) 20-12.5 mg per tablet Take 1 Tab by mouth daily. 90 Tab 1  
 azithromycin (ZITHROMAX) 250 mg tablet 2 tabs po on Day #1 and then 1 tab PO daily on Days #2-5 6 Tab 0  
 acetaminophen (TYLENOL EXTRA STRENGTH) 500 mg tablet Take 500 mg by mouth two (2) times daily as needed for Pain.  levothyroxine (SYNTHROID) 25 mcg tablet Take 1 Tab by mouth Daily (before breakfast). 30 Tab 1 Past History Past Medical History: Past Medical History:  
Diagnosis Date  Back pain  Cervical paraspinal muscle spasm  Cervical radiculopathy  Chronic hip pain  Depression  Diabetes ([facility] [address]  History of asthma   
 History of bronchitis  Hypertension  Hypothyroid  Prediabetes  Sciatica  Sickle cell trait ([facility] [address] Past Surgical History: 
Past Surgical History:  
Procedure Laterality Date  HX CARPAL TUNNEL RELEASE Right  HX HYSTERECTOMY  HX ORTHOPAEDIC    
 cyst removed right wrist  
 HX THYROIDECTOMY Family History: 
Family History Problem Relation Age of Onset  Asthma Mother  Tuberculosis Mother  Alcohol abuse Mother  Drug Abuse Mother  Diabetes Father  Asthma Father  Heart Disease Father  Sickle Cell Anemia Sister  Depression Brother  Lung Cancer Maternal Grandmother  Hypertension Maternal Grandmother  Colon Cancer Paternal Grandmother  Emphysema Paternal Grandmother  Depression Brother  Depression Brother  Depression Brother Social History: 
Social History Tobacco Use  Smoking status: Current Every Day Smoker Packs/day: 0.50  Smokeless tobacco: Never Used Substance Use Topics  Alcohol use: No  
 Drug use: No  
 
 
Allergies: Allergies Allergen Reactions  Tramadol Nausea and Vomiting Review of Systems Review of Systems Constitutional: Negative for chills and fever. Respiratory: Negative for shortness of breath. Cardiovascular: Negative for chest pain. Gastrointestinal: Negative for nausea and vomiting. Skin: Positive for rash. All other systems reviewed and are negative. Physical Exam  
 
Visit Vitals /89 (BP 1 Location: Left arm, BP Patient Position: At rest) Pulse 96 Temp 97.6 °F (36.4 °C) Resp 16 SpO2 100% Physical Exam  
Constitutional: She is oriented to person, place, and time.  She appears well-developed and well-nourished. HENT:  
Head: Normocephalic and atraumatic. Mouth/Throat: Oropharynx is clear and moist.  
Eyes: Conjunctivae are normal. Pupils are equal, round, and reactive to light. No scleral icterus. Neck: Normal range of motion. Neck supple. No JVD present. No tracheal deviation present. Cardiovascular: Normal rate, regular rhythm and normal heart sounds. Pulmonary/Chest: Effort normal and breath sounds normal. No respiratory distress. She has no wheezes. Abdominal: Soft. Bowel sounds are normal.  
Musculoskeletal: Normal range of motion. Neurological: She is alert and oriented to person, place, and time. Gait normal. GCS eye subscore is 4. GCS verbal subscore is 5. GCS motor subscore is 6. Skin: Skin is warm and dry. Rash noted. Rash is urticarial.  
Mild urticaria to bilateral LE and abdomen. Psychiatric: She has a normal mood and affect. Nursing note and vitals reviewed. Diagnostic Study Results Labs - No results found for this or any previous visit (from the past 12 hour(s)). Radiologic Studies - No orders to display Medical Decision Making I am the first provider for this patient. I reviewed the vital signs, available nursing notes, past medical history, past surgical history, family history and social history. Vital Signs-Reviewed the patient's vital signs. Records Reviewed: Nursing Notes and Old Medical Records (Time of Review: 3:20 PM) Provider Notes (Medical Decision Making): well appearing 35 yo with urticaria noted to bilateral legs. No oral involvement, no resp distress. No known contacts or exposures. Will d.c home with pcp fu 
 
Procedures:  
 
Diagnosis Clinical Impression: 1. Hives Disposition: d/c Follow-up Information Follow up With Specialties Details Why Contact Info Xochilt Salas NP Nurse Practitioner   [address] [facility] [address] [facility] [address] [phone] [facility] EMERGENCY DEPT Emergency Medicine   [address] [address] 
[phone] Medication List  
  
START taking these medications   
predniSONE 20 mg tablet Commonly known as:  Carter Noss Take 60 mg by mouth daily for 5 days. With Breakfast 
  
  
ASK your doctor about these medications   
azithromycin 250 mg tablet Commonly known as:  Sheridan Maudlin 2 tabs po on Day #1 and then 1 tab PO daily on Days #2-5 Blood Glucose Control, High Soln Commonly known as:  EMBRACE GLUCOSE CONTROL HIGH Use with glucometer as directed. Blood Glucose Control, Low Soln Commonly known as:  EMBRACE GLUCOSE CONTROL LOW Use with glucometer as directed. * levothyroxine 25 mcg tablet Commonly known as:  SYNTHROID Take 1 Tab by mouth Daily (before breakfast). * levothyroxine 175 mcg tablet Commonly known as:  SYNTHROID 
TAKE 1 TABLET BY MOUTH ONCE DAILY BEFORE  BREAKFAST 
  
lisinopril-hydroCHLOROthiazide 20-12.5 mg per tablet Commonly known as:  Marjeralph Adam Take 1 Tab by mouth daily. TYLENOL EXTRA STRENGTH 500 mg tablet Generic drug:  acetaminophen * This list has 2 medication(s) that are the same as other medications prescribed for you. Read the directions carefully, and ask your doctor or other care provider to review them with you. Where to Get Your Medications Information about where to get these medications is not yet available Ask your nurse or doctor about these medications · predniSONE 20 mg tablet 
  
 
_______________________________ Attestations: 
Scribe Attestation Daquan Bailey acting as a scribe for and in the presence of Radha Proctor February 12, 2019 at 3:20 PM 
    
Provider Attestation:     
I personally performed the services described in the documentation, reviewed the documentation, as recorded by the scribe in my presence, and it accurately and completely records my words and actions.  February 12, 2019 at 3:20 PM  Princess Hodge, PA   
_______________________________

## 2019-03-21 ENCOUNTER — HOSPITAL ENCOUNTER (OUTPATIENT)
Dept: GENERAL RADIOLOGY | Age: 39
Discharge: HOME | End: 2019-03-21
Payer: MEDICARE

## 2019-03-21 DIAGNOSIS — M54.2 CERVICALGIA: ICD-10-CM

## 2019-03-21 PROCEDURE — 72070 X-RAY EXAM THORAC SPINE 2VWS: CPT

## 2019-03-21 PROCEDURE — 72040 X-RAY EXAM NECK SPINE 2-3 VW: CPT

## 2019-03-25 ENCOUNTER — OFFICE VISIT (OUTPATIENT)
Dept: PAIN MANAGEMENT | Age: 39
End: 2019-03-25

## 2019-03-25 VITALS
HEART RATE: 101 BPM | OXYGEN SATURATION: 98 % | DIASTOLIC BLOOD PRESSURE: 86 MMHG | RESPIRATION RATE: 14 BRPM | TEMPERATURE: 98.4 F | SYSTOLIC BLOOD PRESSURE: 117 MMHG | HEIGHT: 72 IN | WEIGHT: 195 LBS | BODY MASS INDEX: 26.41 KG/M2

## 2019-03-25 DIAGNOSIS — M79.18 PIRIFORMIS MUSCLE PAIN: ICD-10-CM

## 2019-03-25 DIAGNOSIS — M51.36 LUMBAR DEGENERATIVE DISC DISEASE: ICD-10-CM

## 2019-03-25 DIAGNOSIS — M70.62 TROCHANTERIC BURSITIS OF BOTH HIPS: ICD-10-CM

## 2019-03-25 DIAGNOSIS — M54.5 LOW BACK PAIN, UNSPECIFIED BACK PAIN LATERALITY, UNSPECIFIED CHRONICITY, WITH SCIATICA PRESENCE UNSPECIFIED: Primary | ICD-10-CM

## 2019-03-25 DIAGNOSIS — M50.30 DDD (DEGENERATIVE DISC DISEASE), CERVICAL: ICD-10-CM

## 2019-03-25 DIAGNOSIS — M70.61 TROCHANTERIC BURSITIS OF BOTH HIPS: ICD-10-CM

## 2019-03-25 DIAGNOSIS — M46.1 SACROILIITIS (HCC): ICD-10-CM

## 2019-03-25 DIAGNOSIS — M51.34 DDD (DEGENERATIVE DISC DISEASE), THORACIC: ICD-10-CM

## 2019-03-25 DIAGNOSIS — M54.2 CERVICALGIA: ICD-10-CM

## 2019-03-25 DIAGNOSIS — M79.18 BILATERAL MYOFASCIAL PAIN: ICD-10-CM

## 2019-03-25 RX ORDER — CELECOXIB 200 MG/1
200 CAPSULE ORAL 2 TIMES DAILY
Qty: 30 CAP | Refills: 0 | Status: SHIPPED | OUTPATIENT
Start: 2019-03-25 | End: 2019-04-09

## 2019-03-25 NOTE — PROGRESS NOTES
Nursing Notes    Patient presents to the office today in follow-up. Patient rates her pain at 9/10 on the numerical pain scale. Reviewed medications with counts as follows:    Rx Date filled Qty Dispensed Pill Count Last Dose Short                                               reviewed YES  Any aberrancies noted on  NO  Last opioid agreement no opioids  Last urine drug screen no opioids  3 most recent PHQ Screens 12/24/2018   Little interest or pleasure in doing things Not at all   Feeling down, depressed, irritable, or hopeless Not at all   Total Score PHQ 2 0       Comments:     POC UDS was not performed in office today    Any new labs or imaging since last appointment? YES, [facility] spine    Have you been to an emergency room (ER) or urgent care clinic since your last visit? YES, X 2 thyroid hives            Have you been hospitalized since your last visit? NO     If yes, where, when, and reason for visit? Have you seen or consulted any other health care providers outside of the [address] [place] [address]  since your last visit? NO     If yes, where, when, and reason for visit? Ms. [place] Clair has a reminder for a \"due or due soon\" health maintenance. I have asked that she contact her primary care provider for follow-up on this health maintenance.

## 2019-03-25 NOTE — PATIENT INSTRUCTIONS
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
## 2019-04-01 ENCOUNTER — TELEPHONE (OUTPATIENT)
Dept: PAIN MANAGEMENT | Age: 39
End: 2019-04-01

## 2019-04-01 NOTE — TELEPHONE ENCOUNTER
Patient called the nurse triage line requesting a DMV sticker. I called the patient back to confirm statement. Patient identified using two patient identifiers (name and ). Patient informed that requesting a DMV tag is usually done at office visits, but will ask the provider, and call them back when I received a response. Patient verbalized understanding.  Patient was last seen 3/25/19 with OLI Parekh.

## 2019-04-04 NOTE — TELEPHONE ENCOUNTER
Called patient, Patient identified using two patient identifiers (name and ). I informed the patient that provider approved for them to have a temporary DMV decal (valid for 6 months), and the form is ready for them to . Patient verbalized understanding.

## 2019-04-22 ENCOUNTER — TELEPHONE (OUTPATIENT)
Dept: PAIN MANAGEMENT | Age: 39
End: 2019-04-22

## 2019-04-22 NOTE — TELEPHONE ENCOUNTER
Patient called the nurse triage line c/o severe pain and requesting a call back from our office. I called the patient back, Patient identified using two patient identifiers (name and ). Patient states they are experiencing severe pain and rates it a 9/10. Patient continue to states that it's been going on for 3 days now, and has taken Celebrex, Tylenol, and a muscle relaxer to no avail. Patient states the pain is the same type of pain they are being treated for, but more intense. I told the patient that I would make the provider aware and ask for advisement,and would call them back when I heard back; but in the meantime if symptoms dictate, to seek immediate medical attention at the ER and/or Urgent Care. Patient verbalized understanding.

## 2019-04-23 NOTE — TELEPHONE ENCOUNTER
Attempted to call patient back to offer Toradol injection as a nurse visit per OLI Albarado's advisement, but patient didn't answer the phone, and had to leave message for patient to call the office back. Clinic number provided.

## 2019-05-14 ENCOUNTER — OFFICE VISIT (OUTPATIENT)
Dept: FAMILY MEDICINE CLINIC | Age: 39
End: 2019-05-14

## 2019-05-14 VITALS
TEMPERATURE: 98.4 F | BODY MASS INDEX: 27.47 KG/M2 | WEIGHT: 202.8 LBS | HEIGHT: 72 IN | HEART RATE: 105 BPM | SYSTOLIC BLOOD PRESSURE: 113 MMHG | RESPIRATION RATE: 20 BRPM | OXYGEN SATURATION: 100 % | DIASTOLIC BLOOD PRESSURE: 73 MMHG

## 2019-05-14 DIAGNOSIS — E03.2 HYPOTHYROIDISM DUE TO NON-MEDICATION EXOGENOUS SUBSTANCES: ICD-10-CM

## 2019-05-14 DIAGNOSIS — R73.03 PREDIABETES: ICD-10-CM

## 2019-05-14 DIAGNOSIS — I10 ESSENTIAL HYPERTENSION: Primary | ICD-10-CM

## 2019-05-14 DIAGNOSIS — L50.8 RECURRENT URTICARIA: ICD-10-CM

## 2019-05-14 RX ORDER — HYDROXYZINE PAMOATE 25 MG/1
25 CAPSULE ORAL
Qty: 45 CAP | Refills: 0 | Status: SHIPPED | OUTPATIENT
Start: 2019-05-14 | End: 2019-09-17

## 2019-05-14 RX ORDER — LEVOTHYROXINE SODIUM 25 UG/1
25 TABLET ORAL
Qty: 30 TAB | Refills: 1 | Status: SHIPPED | OUTPATIENT
Start: 2019-05-14 | End: 2019-07-08 | Stop reason: SDUPTHER

## 2019-05-14 RX ORDER — LEVOTHYROXINE SODIUM 175 UG/1
TABLET ORAL
Qty: 30 TAB | Refills: 2 | Status: SHIPPED | OUTPATIENT
Start: 2019-05-14 | End: 2019-08-07 | Stop reason: SDUPTHER

## 2019-05-14 NOTE — PROGRESS NOTES
Capri Kellogg is a  34 y.o. female presents today for office visit for hives. Patient also states that she was recently diagnosed with scoliosis. 1. Have you been to the ER, urgent care clinic or hospitalized since your last visit? YES 2- [facility], 2- and 2- [facility]     2. Have you seen or consulted any other health care providers outside of the [facility] since your last visit (Include any pap smears or colon screening)? NO

## 2019-05-14 NOTE — PROGRESS NOTES
HISTORY OF PRESENT ILLNESS  Sheyla Rudd is a 34 y.o. female. Patient states she has been having recurrent haves for the last 5 months. Has been taking benadryl as needed for itching/hives. Comments she recently was dx with scoliosis. Comments she was started on celebrex and muscle relaxers. Comments she does have pending appt with pain management. Allergies   Allergen Reactions    Tramadol Nausea and Vomiting     Current Outpatient Medications   Medication Sig Dispense Refill    levothyroxine (SYNTHROID) 175 mcg tablet TAKE 1 TABLET BY MOUTH ONCE DAILY BEFORE  BREAKFAST 30 Tab 2    Blood Glucose Control, High (EMBRACE GLUCOSE CONTROL HIGH) soln Use with glucometer as directed. 3 Each 3    Blood Glucose Control, Low (EMBRACE GLUCOSE CONTROL LOW) soln Use with glucometer as directed. 3 Each 3    lisinopril-hydroCHLOROthiazide (PRINZIDE, ZESTORETIC) 20-12.5 mg per tablet Take 1 Tab by mouth daily. 90 Tab 1    acetaminophen (TYLENOL EXTRA STRENGTH) 500 mg tablet Take 500 mg by mouth two (2) times daily as needed for Pain.  levothyroxine (SYNTHROID) 25 mcg tablet Take 1 Tab by mouth Daily (before breakfast).  27 Tab 1     Past Medical History:   Diagnosis Date    Back pain     Cervical paraspinal muscle spasm     Cervical radiculopathy     Chronic hip pain     Depression     Diabetes (HCC)     History of asthma     History of bronchitis     Hypertension     Hypothyroid     Prediabetes     Sciatica     [facility]ick[facility] trait ([facility]      Social History     Socioeconomic History    Marital status:      Spouse name: Not on file    Number of children: Not on file    Years of education: Not on file    Highest education level: Not on file   Occupational History    Not on file   Social Needs    Financial resource strain: Not on file    Food insecurity:     Worry: Not on file     Inability: Not on file    Transportation needs:     Medical: Not on file     Non-medical: Not on file Tobacco Use    Smoking status: Current Every Day Smoker     Packs/day: 0.50    Smokeless tobacco: Never Used   Substance and Sexual Activity    Alcohol use: No    Drug use: No    Sexual activity: Not on file   Lifestyle    Physical activity:     Days per week: Not on file     Minutes per session: Not on file    Stress: Not on file   Relationships    Social connections:     Talks on phone: Not on file     Gets together: Not on file     Attends Cheondoism service: Not on file     Active member of club or organization: Not on file     Attends meetings of clubs or organizations: Not on file     Relationship status: Not on file    Intimate partner violence:     Fear of current or ex partner: Not on file     Emotionally abused: Not on file     Physically abused: Not on file     Forced sexual activity: Not on file   Other Topics Concern    Not on file   Social History Narrative    Not on file     Review of Systems   Constitutional: Negative for chills and fever. Respiratory: Negative for shortness of breath. Cardiovascular: Negative for chest pain and palpitations. Skin: Positive for rash. /73   Pulse (!) 105   Temp 98.4 °F (36.9 °C) (Oral)   Resp 20   Ht 6' 1\" (1.854 m)   Wt 202 lb 12.8 oz (92 kg)   SpO2 100%   BMI 26.76 kg/m²   Physical Exam   Constitutional: She appears well-developed and well-nourished. HENT:   Head: Normocephalic and atraumatic. Neck: Normal range of motion. Neck supple. Cardiovascular: Normal rate, regular rhythm and normal heart sounds. Exam reveals no gallop and no friction rub. No murmur heard. Pulmonary/Chest: Effort normal and breath sounds normal. She has no wheezes. She has no rhonchi. She has no rales. Lymphadenopathy:     She has no cervical adenopathy. Skin: Rash noted. Rash is urticarial (bilateral inner thighs ). ASSESSMENT and PLAN    ICD-10-CM ICD-9-CM    1. Essential hypertension T53 205.9 METABOLIC PANEL, COMPREHENSIVE   2. 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 Prediabetes R73.03 790.29 HEMOGLOBIN A1C WITH EAG      METABOLIC PANEL, COMPREHENSIVE   4. Recurrent urticaria L50.8 708.8 REFERRAL TO ENT-OTOLARYNGOLOGY     Orders Placed This Encounter    HEMOGLOBIN A1C WITH EAG    METABOLIC PANEL, COMPREHENSIVE    TSH 3RD GENERATION    T4, FREE    REFERRAL TO ENT-OTOLARYNGOLOGY    levothyroxine (SYNTHROID) 175 mcg tablet    levothyroxine (SYNTHROID) 25 mcg tablet    hydrOXYzine pamoate (VISTARIL) 25 mg capsule     I have discussed the diagnosis with the patient and the intended plan as seen in the above orders. The patient has received an after-visit summary and questions were answered concerning future plans. I have discussed medication side effects and warnings with the patient as well. Patient agreeable with above plan and verbalizes understanding. Follow-up and Dispositions    · Return in about 3 months (around 8/14/2019) for HTN/prediabetes/thyroid.

## 2019-05-14 NOTE — PATIENT INSTRUCTIONS
Hives: Care Instructions  Your Care Instructions  Hives are raised, red, itchy patches of skin. They are also called wheals or welts. They usually have red borders and pale centers. Hives range in size from ¼ inch to 3 inches or more across. They may seem to move from place to place on the skin. Several hives may form a large area of raised, red skin. You can get hives after an insect sting, after taking medicine or eating certain foods, or because of infection or stress. Other causes include plants, things you breathe in, makeup, heat, cold, sunlight, and latex. You cannot spread hives to other people. Hives may last a few minutes or a few days, but a single spot may last less than 36 hours. Follow-up care is a key part of your treatment and safety. Be sure to make and go to all appointments, and call your doctor if you are having problems. It's also a good idea to know your test results and keep a list of the medicines you take. How can you care for yourself at home? · Avoid whatever you think may have caused your hives, such as a certain food or medicine. However, you may not know the cause. · Put a cool, wet towel on the area to relieve itching. · Take an over-the-counter antihistamine, such as diphenhydramine (Benadryl), cetirizine (Zyrtec), or loratadine (Claritin), to help stop the hives and calm the itching. Read and follow directions on the label. These medicines can make you feel sleepy. Do not drive while using them. · Stay away from strong soaps, detergents, and chemicals. These can make itching worse. When should you call for help? Call 911 anytime you think you may need emergency care. For example, call if:    · You have symptoms of a severe allergic reaction. These may include:  ? Sudden raised, red areas (hives) all over your body. ? Swelling of the throat, mouth, lips, or tongue. ? Trouble breathing. ? Passing out (losing consciousness).  Or you may feel very lightheaded or suddenly feel weak, confused, or restless.    Call your doctor now or seek immediate medical care if:    · You have symptoms of an allergic reaction, such as:  ? A rash or hives (raised, red areas on the skin). ? Itching. ? Swelling. ? Belly pain, nausea, or vomiting.     · You get hives after you start a new medicine.     · Hives have not gone away after 24 hours.    Watch closely for changes in your health, and be sure to contact your doctor if:    · You do not get better as expected. Where can you learn more? Go to http://ranjan-luis.info/. Enter E524 in the search box to learn more about \"Hives: Care Instructions. \"  Current as of: September 23, 2018  Content Version: 11.9  © 0954-4109 [employer]. Care instructions adapted under license by [employer] (which disclaims liability or warranty for this information). If you have questions about a medical condition or this instruction, always ask your healthcare professional. [address] any warranty or liability for your use of this information.

## 2019-05-15 LAB
ALBUMIN SERPL-MCNC: 4.1 G/DL (ref 3.5–5.5)
ALBUMIN/GLOB SERPL: 1.5 {RATIO} (ref 1.2–2.2)
ALP SERPL-CCNC: 65 IU/L (ref 39–117)
ALT SERPL-CCNC: 11 IU/L (ref 0–32)
AST SERPL-CCNC: 15 IU/L (ref 0–40)
BILIRUB SERPL-MCNC: <0.2 MG/DL (ref 0–1.2)
BUN SERPL-MCNC: 7 MG/DL (ref 6–24)
BUN/CREAT SERPL: 8 (ref 9–23)
CALCIUM SERPL-MCNC: 9.5 MG/DL (ref 8.7–10.2)
CHLORIDE SERPL-SCNC: 103 MMOL/L (ref 96–106)
CO2 SERPL-SCNC: 23 MMOL/L (ref 20–29)
CREAT SERPL-MCNC: 0.89 MG/DL (ref 0.57–1)
EST. AVERAGE GLUCOSE BLD GHB EST-MCNC: 160 MG/DL
GLOBULIN SER CALC-MCNC: 2.8 G/DL (ref 1.5–4.5)
GLUCOSE SERPL-MCNC: 93 MG/DL (ref 65–99)
HBA1C MFR BLD: 7.2 % (ref 4.8–5.6)
POTASSIUM SERPL-SCNC: 3.9 MMOL/L (ref 3.5–5.2)
PROT SERPL-MCNC: 6.9 G/DL (ref 6–8.5)
SODIUM SERPL-SCNC: 140 MMOL/L (ref 134–144)
T4 FREE SERPL-MCNC: 1.11 NG/DL (ref 0.82–1.77)
TSH SERPL DL<=0.005 MIU/L-ACNC: 0.01 UIU/ML (ref 0.45–4.5)

## 2019-05-16 ENCOUNTER — OFFICE VISIT (OUTPATIENT)
Dept: PAIN MANAGEMENT | Age: 39
End: 2019-05-16

## 2019-05-16 VITALS
SYSTOLIC BLOOD PRESSURE: 113 MMHG | RESPIRATION RATE: 16 BRPM | HEIGHT: 72 IN | WEIGHT: 202 LBS | HEART RATE: 91 BPM | BODY MASS INDEX: 27.36 KG/M2 | DIASTOLIC BLOOD PRESSURE: 69 MMHG | OXYGEN SATURATION: 97 % | TEMPERATURE: 97.5 F

## 2019-05-16 DIAGNOSIS — G89.4 CHRONIC PAIN SYNDROME: Primary | ICD-10-CM

## 2019-05-16 RX ORDER — KETOROLAC TROMETHAMINE 30 MG/ML
60 INJECTION, SOLUTION INTRAMUSCULAR; INTRAVENOUS ONCE
Qty: 2 ML | Refills: 0
Start: 2019-05-16 | End: 2019-05-16

## 2019-05-16 NOTE — PATIENT INSTRUCTIONS
Post Injection Instructions    If you develop any abnormal symptoms, such as itching, swelling, redness, rash, or shortness of breath, please call our office at [phone]. Normally, these are temporary symptoms, which resolve within several hours to a day, but our office is more than happy to answer any questions you may have. The provider would like you to observe the injection area for redness, swelling, or increased heat. If any or all of these reactions occur, please call our office as soon as possible. This reaction may indicate the first signs of infection. Although very rare, it is  best if caught early. I have reviewed these instructions and the patient verbalizes understanding.

## 2019-05-16 NOTE — PROGRESS NOTES
The pt came in to the office today to receive a toradol injection for severe pain. 60 mg of toradol IM has been approved by Dr. Za Yung to give the pt. The pt was given 60 mg/2ml IM in her right gluteal muscle. The pt tolerated the injection well. There was no signs or symptoms of infection noted at the injection site. There was no bleeding, swelling or leaking noted after the injection. Post injection instructions put in AVS section for pt.

## 2019-06-07 ENCOUNTER — OFFICE VISIT (OUTPATIENT)
Dept: PAIN MANAGEMENT | Age: 39
End: 2019-06-07

## 2019-06-07 VITALS
WEIGHT: 202 LBS | TEMPERATURE: 97.4 F | OXYGEN SATURATION: 99 % | RESPIRATION RATE: 16 BRPM | SYSTOLIC BLOOD PRESSURE: 128 MMHG | HEIGHT: 72 IN | BODY MASS INDEX: 27.36 KG/M2 | HEART RATE: 93 BPM | DIASTOLIC BLOOD PRESSURE: 82 MMHG

## 2019-06-07 DIAGNOSIS — M70.61 TROCHANTERIC BURSITIS OF BOTH HIPS: ICD-10-CM

## 2019-06-07 DIAGNOSIS — M70.62 TROCHANTERIC BURSITIS OF BOTH HIPS: ICD-10-CM

## 2019-06-07 DIAGNOSIS — M79.10 MYALGIA: Primary | ICD-10-CM

## 2019-06-07 RX ORDER — LIDOCAINE HYDROCHLORIDE 10 MG/ML
6 INJECTION INFILTRATION; PERINEURAL ONCE
Qty: 6 ML | Refills: 0
Start: 2019-06-07 | End: 2019-06-07

## 2019-06-07 NOTE — PATIENT INSTRUCTIONS
Post Injection Instructions If you develop any abnormal symptoms, such as itching, swelling, redness, rash, or shortness of breath, please call our office at [phone]. Normally, these are temporary symptoms, which resolve within several hours to a day, but our office is more than happy to answer any questions you may have. The provider would like you to observe the injection area for redness, swelling, or increased heat. If any or all of these reactions occur, please call our office as soon as possible. This reaction may indicate the first signs of infection. Although very rare, it is  best if caught early. I have reviewed these instructions and the patient verbalizes understanding. Iliotibial Band Syndrome: Exercises Your Care Instructions Here are some examples of typical rehabilitation exercises for your condition. Start each exercise slowly. Ease off the exercise if you start to have pain. Your doctor or physical therapist will tell you when you can start these exercises and which ones will work best for you. How to do the exercises Iliotibial band stretch 1. Lean sideways against a wall. If you are not steady on your feet, hold on to a chair or counter. 2. Stand on the leg with the affected hip, with that leg close to the wall. Then cross your other leg in front of it. 3. Let your affected hip drop out to the side of your body and against the wall. Then lean away from your affected hip until you feel a stretch. 4. Hold the stretch for 15 to 30 seconds. 5. Repeat 2 to 4 times. Piriformis stretch 1. Lie on your back with your legs straight. 2. Lift your affected leg and bend your knee. With your opposite hand, reach across your body, and then gently pull your knee toward your opposite shoulder. 3. Hold the stretch for 15 to 30 seconds. 4. Repeat 2 to 4 times. Hamstring wall stretch 1. Lie on your back in a doorway, with your good leg through the open door. 2. Slide your affected leg up the wall to straighten your knee. You should feel a gentle stretch down the back of your leg. 1. Do not arch your back. 2. Do not bend either knee. 3. Keep one heel touching the floor and the other heel touching the wall. Do not point your toes. 3. Hold the stretch for at least 1 minute to begin. Then try to lengthen the time you hold the stretch to as long as 6 minutes. 4. Repeat 2 to 4 times. 5. If you do not have a place to do this exercise in a doorway, there is another way to do it: 6. Lie on your back, and bend the knee of your affected leg. 7. Loop a towel under the ball and toes of that foot, and hold the ends of the towel in your hands. 8. Straighten your knee, and slowly pull back on the towel. You should feel a gentle stretch down the back of your leg. 9. Hold the stretch for 15 to 30 seconds. Or even better, hold the stretch for 1 minute if you can. 10. Repeat 2 to 4 times. Follow-up care is a key part of your treatment and safety. Be sure to make and go to all appointments, and call your doctor if you are having problems. It's also a good idea to know your test results and keep a list of the medicines you take. Where can you learn more? Go to http://ranjan-luis.info/. Enter P252 in the search box to learn more about \"Iliotibial Band Syndrome: Exercises. \" Current as of: September 20, 2018 Content Version: 11.9 © 3394-5710 [employer]. Care instructions adapted under license by [employer] (which disclaims liability or warranty for this information). If you have questions about a medical condition or this instruction, always ask your healthcare professional. [address] any warranty or liability for your use of this information. Piriformis Syndrome: Exercises Your Care Instructions Here are some examples of typical rehabilitation exercises for your condition. Start each exercise slowly. Ease off the exercise if you start to have pain. Your doctor or physical therapist will tell you when you can start these exercises and which ones will work best for you. How to do the exercises Hip rotator stretch 1. Lie on your back with both knees bent and your feet flat on the floor. 2. Put the ankle of your affected leg on your opposite thigh near your knee. 3. Use your hand to gently push your knee (on your affected leg) away from your body until you feel a gentle stretch around your hip. 4. Hold the stretch for 15 to 30 seconds. 5. Repeat 2 to 4 times. 6. Switch legs and repeat steps 1 through 5. Piriformis stretch 1. Lie on your back with your legs straight. 2. Lift your affected leg and bend your knee. With your opposite hand, reach across your body, and then gently pull your knee toward your opposite shoulder. 3. Hold the stretch for 15 to 30 seconds. 4. Repeat with your other leg. 5. Repeat 2 to 4 times on each side. Lower abdominal strengthening 1. Lie on your back with your knees bent and your feet flat on the floor. 2. Tighten your belly muscles by pulling your belly button in toward your spine. 3. Lift one foot off the floor and bring your knee toward your chest, so that your knee is straight above your hip and your leg is bent like the letter \"L. \" 
4. Lift the other knee up to the same position. 5. Lower one leg at a time to the starting position. 6. Keep alternating legs until you have lifted each leg 8 to 12 times. 7. Be sure to keep your belly muscles tight and your back still as you are moving your legs. Be sure to breathe normally. Follow-up care is a key part of your treatment and safety. Be sure to make and go to all appointments, and call your doctor if you are having problems. It's also a good idea to know your test results and keep a list of the medicines you take. Current as of: September 20, 2018 Content Version: 11.9 © 8905-3693 [employer]. Care instructions adapted under license by [employer] (which disclaims liability or warranty for this information). If you have questions about a medical condition or this instruction, always ask your healthcare professional. [address] any warranty or liability for your use of this information.

## 2019-06-07 NOTE — PROGRESS NOTES
HPI:  Dominique Panchal is a 34 y.o. female here for myofascial trigger point injections and possibly bilateral greater trochanteric bursa injections. She denies interval changes in the character distribution of her symptoms. She continues to have aching and stabbing pain across the lumbosacral beltline and overlying the lateral hips. The pain ranges from 8-9 over 10. GIC-1&9  SANTA-58%      Allergies   Allergen Reactions    Tramadol Nausea and Vomiting     Current Outpatient Medications on File Prior to Visit   Medication Sig    levothyroxine (SYNTHROID) 175 mcg tablet TAKE 1 TABLET BY MOUTH ONCE DAILY BEFORE  BREAKFAST    levothyroxine (SYNTHROID) 25 mcg tablet Take 1 Tab by mouth Daily (before breakfast).  hydrOXYzine pamoate (VISTARIL) 25 mg capsule Take 1 Cap by mouth three (3) times daily as needed for Itching.  Blood Glucose Control, High (EMBRACE GLUCOSE CONTROL HIGH) soln Use with glucometer as directed.  Blood Glucose Control, Low (EMBRACE GLUCOSE CONTROL LOW) soln Use with glucometer as directed.  lisinopril-hydroCHLOROthiazide (PRINZIDE, ZESTORETIC) 20-12.5 mg per tablet Take 1 Tab by mouth daily.  acetaminophen (TYLENOL EXTRA STRENGTH) 500 mg tablet Take 1,000 mg by mouth every six (6) hours as needed for Pain. No current facility-administered medications on file prior to visit. ROS:  Review of systems is negative for fever, chills, nausea, vomiting, diarrhea, constipation, chest pain, shortness of breath, abdominal pain, weakness, trouble swallowing, acute changes in vision, acute changes in hearing, falls, dizziness, bladder incontinence, bowel incontinence, depression, anxiety, suicidal ideation, homicidal ideation, alcohol use.   Review of systems positive for hip pain and lumbosacral pain    Vitals:    06/07/19 0935   BP: 128/82   Pulse: 93   Resp: 16   Temp: 97.4 °F (36.3 °C)   TempSrc: Oral   SpO2: 99%   Weight: 91.6 kg (202 lb)   Height: 6' 1\" (1.854 m)   PainSc: 8          PE:  AFVSS, no acute distress, normal body habitus. A&OXs 3.  normocephalic, atraumatic. Conjugate gaze, clear sclerae  Speech is clear and appropriate. Mood is pleasant and appropriate. Breathing is nonlabored. There are activated trigger points at bilateral lower lumbar paraspinals, right quadratus lumborum, bilateral gluteus bola. There is also tenderness to palpation at the greater trochanters bilaterally. Gait is within functional limits with antalgia. Balance is within functional limits. Primary Care Physician  Chet Love, [address]  [phone]    Assessment/Plan:     ICD-10-CM ICD-9-CM    1. Myalgia M79.10 729.1 INJECT TRIGGER POINTS, > 3      lidocaine (XYLOCAINE) 10 mg/mL (1 %) injection      LIDOCAINE INJECTION   2. Trochanteric bursitis of both hips M70.61 726.5     M70.62        Patient presents today for multiple myofascial trigger point injections. Risks, benefits, alternatives were discussed and all questions were answered. Patient agrees to proceed with the interventions. She tolerated the procedure without complaints. There were no immediate complications. --When patient returns for follow-up we will provide with bilateral greater trochanteric bursa injections if needed. She will perform her home exercise program and stretches daily between now and then. Her next office visit is scheduled for June 21, 2019. F/u:. Follow-up and Dispositions    · Return in about 2 months (around 8/7/2019), or if symptoms worsen or fail to improve, for 30 min. Follow-up via telephone as instructed. Follow-up as needed for procedure related concerns. Maintain regularly scheduled office visit.       Procedure Note  [facility]  OFFICE PROCEDURE PROGRESS NOTE        Chart reviewed for the following:   I, Beryle Bennett, DO, have reviewed the History, Physical and updated the Allergic reactions for [address] [place] [address],[address] performed immediately prior to start of procedure:   I, Hal Golden DO, have performed the following reviews on Pineda Gannon prior to the start of the procedure:            * Patient was identified by name and date of birth   * Agreement on procedure being performed was verified  * Risks and Benefits explained to the patient  * Procedure site verified and marked as necessary  * Patient was positioned for comfort  * Consent was signed and verified     Time: 9543    Date of procedure: 6/7/2019    Procedure performed by:  Hal Golden DO    Provider assisted by: Nereida Miner LPN    Patient assisted by: self    How tolerated by patient: tolerated the procedure well with no complications    Post Procedural Pain Scale: See procedure note. Comments: none, See note for details. Procedure Note  Trigger point injections    Bilateral lower lumbar paraspinals, right quadratus lumborum, bilateral gluteus bola    Consent was obtained. Timeout was performed. Patient was positioned seated with her head resting on the exam table. Sites were marked. Each injection site was cleaned with ChloraPrep and then pretreated with vapo coolant spray. Each injection site was injected with approximately 0.5 mL of 1% lidocaine following negative aspiration using a 25-gauge 1.5 inch needle. Patient tolerated procedure without complaints. There were no immediate complications. Postprocedure pain relief was better on the left side than the right side. Patient was observed for 10 minutes post procedure and discharged home in stable condition.

## 2019-07-03 ENCOUNTER — DOCUMENTATION ONLY (OUTPATIENT)
Dept: PAIN MANAGEMENT | Age: 39
End: 2019-07-03

## 2019-07-03 DIAGNOSIS — M79.10 MYALGIA: Primary | ICD-10-CM

## 2019-07-03 RX ORDER — CYCLOBENZAPRINE HCL 10 MG
10 TABLET ORAL
Qty: 90 TAB | Refills: 0 | Status: SHIPPED | OUTPATIENT
Start: 2019-07-03 | End: 2019-09-10 | Stop reason: SDUPTHER

## 2019-08-07 DIAGNOSIS — E03.2 HYPOTHYROIDISM DUE TO NON-MEDICATION EXOGENOUS SUBSTANCES: ICD-10-CM

## 2019-08-07 RX ORDER — LEVOTHYROXINE SODIUM 175 UG/1
TABLET ORAL
Qty: 30 TAB | Refills: 0 | Status: SHIPPED | OUTPATIENT
Start: 2019-08-07 | End: 2019-09-06 | Stop reason: SDUPTHER

## 2019-08-07 NOTE — PROGRESS NOTES
Pt is here for  F/u HTN, Diabetes, Thyroid, back pain. Pt states the back pain is causing issues with sleep. 1. Have you been to the ER, urgent care clinic since your last visit? Hospitalized since your last visit? Yes 10/3/18 [facility] back pain    2. Have you seen or consulted any other health care providers outside of the [facility] since your last visit? Include any pap smears or colon screening.  No Pt alert, oriented, from home, but asleep while visited, unable to interview at present, s/p Endoscopy today, NPO/clear liquid diet x 2 to 3d; lunch tray untouched yet; h/o DM x 11y, alcohol withdrawal noted

## 2019-08-26 DIAGNOSIS — I10 ESSENTIAL HYPERTENSION: ICD-10-CM

## 2019-08-26 RX ORDER — LISINOPRIL AND HYDROCHLOROTHIAZIDE 12.5; 2 MG/1; MG/1
TABLET ORAL
Qty: 90 TAB | Refills: 0 | Status: SHIPPED | OUTPATIENT
Start: 2019-08-26 | End: 2019-09-09 | Stop reason: SDUPTHER

## 2019-09-05 ENCOUNTER — OFFICE VISIT (OUTPATIENT)
Dept: FAMILY MEDICINE CLINIC | Age: 39
End: 2019-09-05

## 2019-09-05 VITALS
DIASTOLIC BLOOD PRESSURE: 84 MMHG | TEMPERATURE: 98.2 F | RESPIRATION RATE: 16 BRPM | OXYGEN SATURATION: 98 % | HEIGHT: 72 IN | HEART RATE: 82 BPM | BODY MASS INDEX: 26.95 KG/M2 | WEIGHT: 199 LBS | SYSTOLIC BLOOD PRESSURE: 118 MMHG

## 2019-09-05 DIAGNOSIS — E11.65 TYPE 2 DIABETES MELLITUS WITH HYPERGLYCEMIA, WITHOUT LONG-TERM CURRENT USE OF INSULIN (HCC): ICD-10-CM

## 2019-09-05 DIAGNOSIS — E03.2 HYPOTHYROIDISM DUE TO NON-MEDICATION EXOGENOUS SUBSTANCES: ICD-10-CM

## 2019-09-05 DIAGNOSIS — R55 SYNCOPE, UNSPECIFIED SYNCOPE TYPE: ICD-10-CM

## 2019-09-05 DIAGNOSIS — R51.9 INTRACTABLE HEADACHE, UNSPECIFIED CHRONICITY PATTERN, UNSPECIFIED HEADACHE TYPE: Primary | ICD-10-CM

## 2019-09-05 DIAGNOSIS — Z00.00 INITIAL MEDICARE ANNUAL WELLNESS VISIT: ICD-10-CM

## 2019-09-05 DIAGNOSIS — K62.5 BRIGHT RED RECTAL BLEEDING: ICD-10-CM

## 2019-09-05 DIAGNOSIS — I10 ESSENTIAL HYPERTENSION: ICD-10-CM

## 2019-09-05 NOTE — PROGRESS NOTES
Stephanie Anglin presents today for   Chief Complaint   Patient presents with    Headache    Rectal Injury       Is someone accompanying this pt? no    Is the patient using any DME equipment during OV? no    Depression Screening:  3 most recent PHQ Screens 6/7/2019   Little interest or pleasure in doing things Not at all   Feeling down, depressed, irritable, or hopeless Not at all   Total Score PHQ 2 0   Trouble falling or staying asleep, or sleeping too much Not at all   Feeling tired or having little energy Not at all   Poor appetite, weight loss, or overeating Not at all   Feeling bad about yourself - or that you are a failure or have let yourself or your family down Not at all   Trouble concentrating on things such as school, work, reading, or watching TV Not at all   Moving or speaking so slowly that other people could have noticed; or the opposite being so fidgety that others notice Not at all   Thoughts of being better off dead, or hurting yourself in some way Not at all   PHQ 9 Score 0   How difficult have these problems made it for you to do your work, take care of your home and get along with others Not difficult at all       Learning Assessment:  Learning Assessment 6/4/2018   PRIMARY LEARNER Patient   PRIMARY LANGUAGE ENGLISH   LEARNER PREFERENCE PRIMARY LISTENING   ANSWERED BY patient   RELATIONSHIP SELF       Abuse Screening:  Abuse Screening Questionnaire 6/7/2019   Do you ever feel afraid of your partner? N   Are you in a relationship with someone who physically or mentally threatens you? N   Is it safe for you to go home? Y       Fall Risk  Fall Risk Assessment, last 12 mths 12/24/2018   Able to walk? Yes   Fall in past 12 months? No       Health Maintenance reviewed and discussed and ordered per Provider.     Health Maintenance Due   Topic Date Due    Pneumococcal 0-64 years (1 of 1 - PPSV23) 09/03/1984    FOOT EXAM Q1  09/03/1988    EYE EXAM RETINAL OR DILATED  09/03/1988    DTaP/Tdap/Td series (1 - Tdap) 09/03/1999    PAP AKA CERVICAL CYTOLOGY  09/03/1999    MEDICARE YEARLY EXAM  10/18/2018    MICROALBUMIN Q1  06/08/2019    LIPID PANEL Q1  06/08/2019    Influenza Age 9 to Adult  08/01/2019           Coordination of Care:  1. Have you been to the ER, urgent care clinic since your last visit? Hospitalized since your last visit? no    2. Have you seen or consulted any other health care providers outside of the [address] [place] [address] since your last visit? Include any pap smears or colon screening.  no

## 2019-09-05 NOTE — PATIENT INSTRUCTIONS
Medicare Wellness Visit, Female     The best way to live healthy is to have a lifestyle where you eat a well-balanced diet, exercise regularly, limit alcohol use, and quit all forms of tobacco/nicotine, if applicable. Regular preventive services are another way to keep healthy. Preventive services (vaccines, screening tests, monitoring & exams) can help personalize your care plan, which helps you manage your own care. Screening tests can find health problems at the earliest stages, when they are easiest to treat. [facility] follows the current, evidence-based guidelines published by the [facility] ([facility]) when recommending preventive services for our patients. Because we follow these guidelines, sometimes recommendations change over time as research supports it. (For example, mammograms used to be recommended annually. Even though Medicare will still pay for an annual mammogram, the newer guidelines recommend a mammogram every two years for women of average risk.)  Of course, you and your doctor may decide to screen more often for some diseases, based on your risk and your health status. Preventive services for you include:  - Medicare offers their members a free annual wellness visit, which is time for you and your primary care provider to discuss and plan for your preventive service needs. Take advantage of this benefit every year!  -All adults over the age of 72 should receive the recommended pneumonia vaccines. Current USPSTF guidelines recommend a series of two vaccines for the best pneumonia protection.   -All adults should have a flu vaccine yearly and a tetanus vaccine every 10 years. All adults age 61 and older should receive a shingles vaccine once in their lifetime.    -A bone mass density test is recommended when a woman turns 65 to screen for osteoporosis. This test is only recommended one time, as a screening.  Some providers will use this same test as a disease monitoring tool if you already have osteoporosis. -All adults age 38-68 who are overweight should have a diabetes screening test once every three years.   -Other screening tests and preventive services for persons with diabetes include: an eye exam to screen for diabetic retinopathy, a kidney function test, a foot exam, and stricter control over your cholesterol.   -Cardiovascular screening for adults with routine risk involves an electrocardiogram (ECG) at intervals determined by your doctor.   -Colorectal cancer screenings should be done for adults age 54-65 with no increased risk factors for colorectal cancer. There are a number of acceptable methods of screening for this type of cancer. Each test has its own benefits and drawbacks. Discuss with your doctor what is most appropriate for you during your annual wellness visit. The different tests include: colonoscopy (considered the best screening method), a fecal occult blood test, a fecal DNA test, and sigmoidoscopy. -Breast cancer screenings are recommended every other year for women of normal risk, age 54-69.  -Cervical cancer screenings for women over age 72 are only recommended with certain risk factors.   -All adults born between [facility] should be screened once for Hepatitis C.      Here is a list of your current Health Maintenance items (your personalized list of preventive services) with a due date:  Health Maintenance Due   Topic Date Due    Pneumococcal Vaccine (1 of 1 - PPSV23) 09/03/1984    Diabetic Foot Care  09/03/1988    Eye Exam  09/03/1988    DTaP/Tdap/Td  (1 - Tdap) 09/03/1999    Pap Test  09/03/1999    Annual Well Visit  10/18/2018    Albumin Urine Test  06/08/2019    Cholesterol Test   06/08/2019    Flu Vaccine  08/01/2019

## 2019-09-05 NOTE — PROGRESS NOTES
This is an Initial Medicare Annual Wellness Exam (AWV) (Performed 12 months after IPPE or effective date of Medicare Part B enrollment, Once in a lifetime)    I have reviewed the patient's medical history in detail and updated the computerized patient record. History     Past Medical History:   Diagnosis Date    Back pain     Cervical paraspinal muscle spasm     Cervical radiculopathy     Chronic hip pain     Depression     Diabetes (HCC)     History of asthma     History of bronchitis     Hypertension     Hypothyroid     Prediabetes     Sciatica     Sickle cell trait (HCC)       Past Surgical History:   Procedure Laterality Date    HX CARPAL TUNNEL RELEASE Right     HX HYSTERECTOMY      HX ORTHOPAEDIC      cyst removed right wrist    HX THYROIDECTOMY       Current Outpatient Medications   Medication Sig Dispense Refill    lisinopril-hydroCHLOROthiazide (PRINZIDE, ZESTORETIC) 20-12.5 mg per tablet TAKE 1 TABLET BY MOUTH DAILY 90 Tab 0    levothyroxine (SYNTHROID) 175 mcg tablet TAKE 1 TABLET BY MOUTH ONCE DAILY BEFORE BREAKFAST 30 Tab 0    levothyroxine (SYNTHROID) 25 mcg tablet TAKE 1 TABLET BY MOUTH DAILY BEFORE BREAKFAST 30 Tab 1    cyclobenzaprine (FLEXERIL) 10 mg tablet Take 1 Tab by mouth three (3) times daily as needed for Muscle Spasm(s). 90 Tab 0    hydrOXYzine pamoate (VISTARIL) 25 mg capsule Take 1 Cap by mouth three (3) times daily as needed for Itching. 45 Cap 0    Blood Glucose Control, High (EMBRACE GLUCOSE CONTROL HIGH) soln Use with glucometer as directed. 3 Each 3    Blood Glucose Control, Low (EMBRACE GLUCOSE CONTROL LOW) soln Use with glucometer as directed. 3 Each 3    acetaminophen (TYLENOL EXTRA STRENGTH) 500 mg tablet Take 1,000 mg by mouth every six (6) hours as needed for Pain.        Allergies   Allergen Reactions    Tramadol Nausea and Vomiting     Family History   Problem Relation Age of Onset    Asthma Mother     Tuberculosis Mother     Alcohol abuse Mother     Drug Abuse Mother     Diabetes Father     Asthma Father     Heart Disease Father     Sickle Cell Anemia Sister     Depression Brother     Lung Cancer Maternal Grandmother     Hypertension Maternal Grandmother     Colon Cancer Paternal Grandmother     Emphysema Paternal Grandmother     Depression Brother     Depression Brother     Depression Brother      Social History     Tobacco Use    Smoking status: Current Every Day Smoker     Packs/day: 0.50    Smokeless tobacco: Never Used   Substance Use Topics    Alcohol use: No     Patient Active Problem List   Diagnosis Code    Hypertension I5    Hypothyroid E03.9    Hyperglycemia due to type 2 diabetes mellitus ([facility] [address] E11.65    Nicotine dependence, cigarettes, uncomplicated J93.485    Depression F32.9    DDD (degenerative disc disease), lumbar M51.36    Fatigue R53.83    Urgency of micturition R39.15    Encounter for long-term (current) use of medications Z79.899    Hypovitaminosis D E55.9    Prediabetes R73.03       Depression Risk Factor Screening:     3 most recent PHQ Screens 6/7/2019   Little interest or pleasure in doing things Not at all   Feeling down, depressed, irritable, or hopeless Not at all   Total Score PHQ 2 0   Trouble falling or staying asleep, or sleeping too much Not at all   Feeling tired or having little energy Not at all   Poor appetite, weight loss, or overeating Not at all   Feeling bad about yourself - or that you are a failure or have let yourself or your family down Not at all   Trouble concentrating on things such as school, work, reading, or watching TV Not at all   Moving or speaking so slowly that other people could have noticed; or the opposite being so fidgety that others notice Not at all   Thoughts of being better off dead, or hurting yourself in some way Not at all   PHQ 9 Score 0   How difficult have these problems made it for you to do your work, take care of your home and get along with others Not difficult at all     Alcohol Risk Factor Screening: You do not drink alcohol or very rarely. Functional Ability and Level of Safety:     Hearing Loss  Hearing is good. States sometimes she has ringing in ears    Activities of Daily Living  The home contains: no safety equipment. Patient does total self care    Fall Risk  Fall Risk Assessment, last 12 mths 12/24/2018   Able to walk? Yes   Fall in past 12 months? No       Abuse Screen  Patient is not abused    Cognitive Screening   Evaluation of Cognitive Function:  Has your family/caregiver stated any concerns about your memory: no  Normal    Patient Care Team   Patient Care Team:  Dmitriy Judge NP as PCP - General (Nurse Practitioner)    Assessment/Plan   Education and counseling provided:  Are appropriate based on today's review and evaluation    Diagnoses and all orders for this visit:    1. Intractable headache, unspecified chronicity pattern, unspecified headache type  -     REFERRAL TO NEUROLOGY    2. Syncope, unspecified syncope type  -     REFERRAL TO NEUROLOGY    3. Bright red rectal bleeding  -     REFERRAL TO GASTROENTEROLOGY    4. Essential hypertension  -     METABOLIC PANEL, COMPREHENSIVE; Future  -     LIPID PANEL; Future    5. Hypothyroidism due to non-medication exogenous substances  -     TSH 3RD GENERATION; Future  -     T4, FREE; Future    6. Initial Medicare annual wellness visit    7. Type 2 diabetes mellitus with hyperglycemia, without long-term current use of insulin (HCC)  -     METABOLIC PANEL, COMPREHENSIVE; Future  -     HEMOGLOBIN A1C WITH EAG; Future  -     LIPID PANEL;  Future    Other orders  -     METABOLIC PANEL, COMPREHENSIVE  -     LIPID PANEL  -     HEMOGLOBIN A1C WITH EAG  -     T4, FREE  -     TSH 3RD GENERATION      Health Maintenance Due   Topic Date Due    Pneumococcal 0-64 years (1 of 1 - PPSV23) 09/03/1984    FOOT EXAM Q1  09/03/1988    EYE EXAM RETINAL OR DILATED  09/03/1988    DTaP/Tdap/Td series (1 - Tdap) 09/03/1999    PAP AKA CERVICAL CYTOLOGY  09/03/1999    MEDICARE YEARLY EXAM  10/18/2018    MICROALBUMIN Q1  06/08/2019    LIPID PANEL Q1  06/08/2019    Influenza Age 5 to Adult  08/01/2019

## 2019-09-05 NOTE — PROGRESS NOTES
HISTORY OF PRESENT ILLNESS  Bernie Dan is a 37 y.o. female. Patient states for the last couple of months she has had increased headaches. Comments she further has been having increased dizziness and rigging in her ears(unilateral) which has been intermittent for years. Reports she just returned from [facility] and while she was on vacation she felt generalized malaise. Headaches can occur on the right, left or frontal areas. Comments at times she will have photophobia, at times headache will wake her up out of her sleep. Comments headaches last for 2-3 days but last headache lasted for 2 weeks. States headache resolved 2 weeks ago. Reports she will take otc tylenol or ibuprofen and use a pillow to put pressure on her head where the headache is present with improvement, she is able to return to sleep. Described as a constant dull with intermittent sharp pain when present. Sharp pains occur if she has to move. She does have dizziness with headaches  However, dizziness also does occur without headaches. Reports she will feel a rushing feeling and will have to sit down and put her head between her legs. Resolves after several seconds. She has had 2 syncopal episodes in the past with dizziness. Reports episodes occurred 2-4 times per year for the last 5 years. Recently increased to every 2-3 days, one to several episodes for the last year. Comments she has not been neurology in the past.  States previously when she was recently referred she was scared to go because she didn't want bad news. Further reports she noticed bright red blood in her stool 2 days ago. Denies pain with BM. States when she was living in [place] she was seen in the ED twice due to stomach pain and constipation. Comments she thinks during one of her visits she had CT scan which revealed an infection unsure if it is was diveritultis.   She has referred to GI but  Was not able to go due to transportation issues and nearest specialist was miles away [place]. Comments she has a long standing history of constipation since she was a young child. Has had to take otc medications to help with BM. Reports positive family history of colon cancer in paternal grandmother. Allergies   Allergen Reactions    Tramadol Nausea and Vomiting     Current Outpatient Medications   Medication Sig Dispense Refill    lisinopril-hydroCHLOROthiazide (PRINZIDE, ZESTORETIC) 20-12.5 mg per tablet TAKE 1 TABLET BY MOUTH DAILY 90 Tab 0    levothyroxine (SYNTHROID) 175 mcg tablet TAKE 1 TABLET BY MOUTH ONCE DAILY BEFORE BREAKFAST 30 Tab 0    levothyroxine (SYNTHROID) 25 mcg tablet TAKE 1 TABLET BY MOUTH DAILY BEFORE BREAKFAST 30 Tab 1    cyclobenzaprine (FLEXERIL) 10 mg tablet Take 1 Tab by mouth three (3) times daily as needed for Muscle Spasm(s). 90 Tab 0    hydrOXYzine pamoate (VISTARIL) 25 mg capsule Take 1 Cap by mouth three (3) times daily as needed for Itching. 45 Cap 0    Blood Glucose Control, High (EMBRACE GLUCOSE CONTROL HIGH) soln Use with glucometer as directed. 3 Each 3    Blood Glucose Control, Low (EMBRACE GLUCOSE CONTROL LOW) soln Use with glucometer as directed. 3 Each 3    acetaminophen (TYLENOL EXTRA STRENGTH) 500 mg tablet Take 1,000 mg by mouth every six (6) hours as needed for Pain.        Past Medical History:   Diagnosis Date    Back pain     Cervical paraspinal muscle spasm     Cervical radiculopathy     Chronic hip pain     Depression     Diabetes (HCC)     History of asthma     History of bronchitis     Hypertension     Hypothyroid     Prediabetes     Sciatica     Sickle cell trait (HCC)      Social History     Socioeconomic History    Marital status:      Spouse name: Not on file    Number of children: Not on file    Years of education: Not on file    Highest education level: Not on file   Occupational History    Not on file   Social Needs    Financial resource strain: Not on file    Food insecurity:     Worry: Not on file     Inability: Not on file    Transportation needs:     Medical: Not on file     Non-medical: Not on file   Tobacco Use    Smoking status: Current Every Day Smoker     Packs/day: 0.50    Smokeless tobacco: Never Used   Substance and Sexual Activity    Alcohol use: No    Drug use: No    Sexual activity: Not on file   Lifestyle    Physical activity:     Days per week: Not on file     Minutes per session: Not on file    Stress: Not on file   Relationships    Social connections:     Talks on phone: Not on file     Gets together: Not on file     Attends Synagogue service: Not on file     Active member of club or organization: Not on file     Attends meetings of clubs or organizations: Not on file     Relationship status: Not on file    Intimate partner violence:     Fear of current or ex partner: Not on file     Emotionally abused: Not on file     Physically abused: Not on file     Forced sexual activity: Not on file   Other Topics Concern    Not on file   Social History Narrative    Not on file     Wt Readings from Last 3 Encounters:   09/05/19 199 lb (90.3 kg)   06/07/19 202 lb (91.6 kg)   05/16/19 202 lb (91.6 kg)     BP Readings from Last 3 Encounters:   09/05/19 118/84   06/07/19 128/82   05/16/19 113/69     Review of Systems   Constitutional: Positive for malaise/fatigue. Negative for chills and fever. Respiratory: Negative for shortness of breath. Cardiovascular: Negative for chest pain and palpitations. Gastrointestinal: Positive for abdominal pain (intermittent), blood in stool (x2 episodes) and constipation. Neurological: Positive for dizziness and headaches.      /84 (BP 1 Location: Left arm, BP Patient Position: Sitting)   Pulse 82   Temp 98.2 °F (36.8 °C) (Oral)   Resp 16   Ht 6' 1\" (1.854 m)   Wt 199 lb (90.3 kg)   SpO2 98%   BMI 26.25 kg/m²      Physical Exam   Constitutional: She is oriented to person, place, and time. She appears well-developed and well-nourished. HENT:   Head: Normocephalic and atraumatic. Neck: Normal range of motion. Neck supple. Cardiovascular: Normal rate, regular rhythm and normal heart sounds. Exam reveals no gallop and no friction rub. No murmur heard. Pulmonary/Chest: Effort normal and breath sounds normal. She has no wheezes. She has no rhonchi. She has no rales. Abdominal: Soft. Normal appearance and bowel sounds are normal. There is no tenderness. Musculoskeletal: She exhibits no edema. Neurological: She is alert and oriented to person, place, and time. She has normal reflexes. No cranial nerve deficit. Coordination normal.   Skin: Skin is warm and dry. Psychiatric: She has a normal mood and affect. Her behavior is normal. Judgment and thought content normal.     ASSESSMENT and PLAN    ICD-10-CM ICD-9-CM    1. Intractable headache, unspecified chronicity pattern, unspecified headache type R51 784.0 REFERRAL TO NEUROLOGY   2. Syncope, unspecified syncope type R55 780.2 REFERRAL TO NEUROLOGY   3. Bright red rectal bleeding K62.5 569.3 REFERRAL TO GASTROENTEROLOGY   4. Essential hypertension U96 692.3 METABOLIC PANEL, COMPREHENSIVE      LIPID PANEL   5. Hypothyroidism due to non-medication exogenous substances E03.2 244.3 TSH 3RD GENERATION      T4, FREE   6. Initial Medicare annual wellness visit Z00.00 V70.0    7.  Type 2 diabetes mellitus with hyperglycemia, without long-term current use of insulin ([facility]) F74.60 771.18 METABOLIC PANEL, COMPREHENSIVE     790.29 HEMOGLOBIN A1C WITH EAG      LIPID PANEL     Orders Placed This Encounter    METABOLIC PANEL, COMPREHENSIVE    TSH 3RD GENERATION    HEMOGLOBIN A1C WITH EAG    LIPID PANEL    T4, FREE    METABOLIC PANEL, COMPREHENSIVE    LIPID PANEL    HEMOGLOBIN A1C WITH EAG    T4, FREE    TSH 3RD GENERATION    REFERRAL TO GASTROENTEROLOGY    [facility]       alarm signs when to seek emergent care provided and reviewed   I have discussed the diagnosis with the patient and the intended plan as seen in the above orders. The patient has received an after-visit summary and questions were answered concerning future plans. I have discussed medication side effects and warnings with the patient as well. Patient agreeable with above plan and verbalizes understanding. Follow-up and Dispositions    · Return in about 4 weeks (around 10/3/2019) for fatigue/headaches.

## 2019-09-06 DIAGNOSIS — E03.2 HYPOTHYROIDISM DUE TO NON-MEDICATION EXOGENOUS SUBSTANCES: ICD-10-CM

## 2019-09-06 LAB
ALBUMIN SERPL-MCNC: 4 G/DL (ref 3.5–5.5)
ALBUMIN/GLOB SERPL: 1.5 {RATIO} (ref 1.2–2.2)
ALP SERPL-CCNC: 62 IU/L (ref 39–117)
ALT SERPL-CCNC: 9 IU/L (ref 0–32)
AST SERPL-CCNC: 14 IU/L (ref 0–40)
BILIRUB SERPL-MCNC: <0.2 MG/DL (ref 0–1.2)
BUN SERPL-MCNC: 9 MG/DL (ref 6–24)
BUN/CREAT SERPL: 10 (ref 9–23)
CALCIUM SERPL-MCNC: 9.4 MG/DL (ref 8.7–10.2)
CHLORIDE SERPL-SCNC: 102 MMOL/L (ref 96–106)
CHOLEST SERPL-MCNC: 218 MG/DL (ref 100–199)
CO2 SERPL-SCNC: 25 MMOL/L (ref 20–29)
CREAT SERPL-MCNC: 0.91 MG/DL (ref 0.57–1)
EST. AVERAGE GLUCOSE BLD GHB EST-MCNC: 137 MG/DL
GLOBULIN SER CALC-MCNC: 2.7 G/DL (ref 1.5–4.5)
GLUCOSE SERPL-MCNC: 114 MG/DL (ref 65–99)
HBA1C MFR BLD: 6.4 % (ref 4.8–5.6)
HDLC SERPL-MCNC: 39 MG/DL
LDLC SERPL CALC-MCNC: 146 MG/DL (ref 0–99)
POTASSIUM SERPL-SCNC: 3.7 MMOL/L (ref 3.5–5.2)
PROT SERPL-MCNC: 6.7 G/DL (ref 6–8.5)
SODIUM SERPL-SCNC: 142 MMOL/L (ref 134–144)
T4 FREE SERPL-MCNC: 1.13 NG/DL (ref 0.82–1.77)
TRIGL SERPL-MCNC: 166 MG/DL (ref 0–149)
TSH SERPL DL<=0.005 MIU/L-ACNC: 0.06 UIU/ML (ref 0.45–4.5)
VLDLC SERPL CALC-MCNC: 33 MG/DL (ref 5–40)

## 2019-09-06 RX ORDER — LEVOTHYROXINE SODIUM 175 UG/1
TABLET ORAL
Qty: 30 TAB | Refills: 0 | Status: SHIPPED | OUTPATIENT
Start: 2019-09-06 | End: 2019-11-05 | Stop reason: SDUPTHER

## 2019-09-06 RX ORDER — LEVOTHYROXINE SODIUM 25 UG/1
TABLET ORAL
Qty: 30 TAB | Refills: 0 | Status: SHIPPED | OUTPATIENT
Start: 2019-09-06 | End: 2019-11-05 | Stop reason: SDUPTHER

## 2019-09-09 DIAGNOSIS — I10 ESSENTIAL HYPERTENSION: ICD-10-CM

## 2019-09-10 ENCOUNTER — OFFICE VISIT (OUTPATIENT)
Dept: PAIN MANAGEMENT | Age: 39
End: 2019-09-10

## 2019-09-10 VITALS
DIASTOLIC BLOOD PRESSURE: 93 MMHG | WEIGHT: 199 LBS | TEMPERATURE: 97.7 F | HEIGHT: 72 IN | BODY MASS INDEX: 26.95 KG/M2 | OXYGEN SATURATION: 98 % | RESPIRATION RATE: 18 BRPM | SYSTOLIC BLOOD PRESSURE: 148 MMHG | HEART RATE: 84 BPM

## 2019-09-10 DIAGNOSIS — M70.61 TROCHANTERIC BURSITIS OF BOTH HIPS: Primary | ICD-10-CM

## 2019-09-10 DIAGNOSIS — M70.62 TROCHANTERIC BURSITIS OF BOTH HIPS: Primary | ICD-10-CM

## 2019-09-10 DIAGNOSIS — M79.10 MYALGIA: ICD-10-CM

## 2019-09-10 RX ORDER — LIDOCAINE HYDROCHLORIDE 10 MG/ML
5 INJECTION INFILTRATION; PERINEURAL ONCE
Qty: 5 ML | Refills: 0
Start: 2019-09-10 | End: 2019-09-10

## 2019-09-10 RX ORDER — CYCLOBENZAPRINE HCL 10 MG
10 TABLET ORAL
Qty: 90 TAB | Refills: 3 | Status: SHIPPED | OUTPATIENT
Start: 2019-09-10 | End: 2020-04-13 | Stop reason: SDUPTHER

## 2019-09-10 RX ORDER — TRIAMCINOLONE ACETONIDE 40 MG/ML
40 INJECTION, SUSPENSION INTRA-ARTICULAR; INTRAMUSCULAR ONCE
Qty: 1 ML | Refills: 0
Start: 2019-09-10 | End: 2019-09-10

## 2019-09-10 RX ORDER — LISINOPRIL AND HYDROCHLOROTHIAZIDE 12.5; 2 MG/1; MG/1
TABLET ORAL
Qty: 90 TAB | Refills: 0 | Status: SHIPPED | OUTPATIENT
Start: 2019-09-10 | End: 2019-11-05 | Stop reason: SDUPTHER

## 2019-09-10 NOTE — PROGRESS NOTES
Nursing Notes    Patient presents to the office today for bilateral hip injections to help with her chronic pain. Patient rates her pain at 10/10 on the numerical pain scale.  reviewed NO  Any aberrancies noted on  NO  Last opioid agreement N/A  Last urine drug screen N/A    Comments:     POC UDS was not performed in office today    Any new labs or imaging since last appointment? YES. Pt states that she has had some labs done with her pcp    Have you been to an emergency room (ER) or urgent care clinic since your last visit? NO            Have you been hospitalized since your last visit? NO     If yes, where, when, and reason for visit? Have you seen or consulted any other health care providers outside of the [address] [place] [address]  since your last visit? YES  If yes, where, when, and reason for visit? pcp  Ms. Tim Zhu has a reminder for a \"due or due soon\" health maintenance. I have asked that she contact her primary care provider for follow-up on this health maintenance.     3 most recent PHQ Screens 9/10/2019   Little interest or pleasure in doing things Not at all   Feeling down, depressed, irritable, or hopeless Not at all   Total Score PHQ 2 0   Trouble falling or staying asleep, or sleeping too much Not at all   Feeling tired or having little energy Not at all   Poor appetite, weight loss, or overeating Not at all   Feeling bad about yourself - or that you are a failure or have let yourself or your family down Not at all   Trouble concentrating on things such as school, work, reading, or watching TV Not at all   Moving or speaking so slowly that other people could have noticed; or the opposite being so fidgety that others notice Not at all   Thoughts of being better off dead, or hurting yourself in some way Not at all   PHQ 9 Score 0   How difficult have these problems made it for you to do your work, take care of your home and get along with others Not difficult at all

## 2019-09-10 NOTE — PATIENT INSTRUCTIONS
Post Injection Instructions    If you develop any abnormal symptoms, such as itching, swelling, redness, rash, or shortness of breath, please call our office at [phone]. Normally, these are temporary symptoms, which resolve within several hours to a day, but our office is more than happy to answer any questions you may have. The provider would like you to observe the injection area for redness, swelling, or increased heat. If any or all of these reactions occur, please call our office as soon as possible. This reaction may indicate the first signs of infection. Although very rare, it is  best if caught early. I have reviewed these instructions and the patient verbalizes understanding.

## 2019-09-10 NOTE — PROGRESS NOTES
HPI:  Antoinette Martinez is a 37 y.o. female here for bilateral greater trochanteric bursa injections. Patient denies interval changes in the character or distribution of the pain. Pain is located at the lateral margins of bilateral hips. Pain is described as a constant aching to stabbing pain and ranges from 7-9 over 10. Currently pain is reported to be 10/10. Symptoms are worse with ipsilateral side-lying and with prolonged sitting. GIC- 1 and 5  SANTA- 48%      Allergies   Allergen Reactions    Tramadol Nausea and Vomiting     ROS:  Review of systems is negative for fever, chills, nausea, vomiting, diarrhea, constipation, chest pain, shortness of breath, abdominal pain, weakness, trouble swallowing, acute changes in vision, acute changes in hearing, falls, dizziness, bladder incontinence, bowel incontinence, depression, anxiety, suicidal ideation, homicidal ideation, alcohol use. Review of systems positive for hip pain and back pain. Vitals:    09/10/19 1023 09/10/19 1039   BP: (!) 137/97 (!) 148/93   Pulse: 87 84   Resp: 18    Temp: 97.7 °F (36.5 °C)    TempSrc: Oral    SpO2: 98%    Weight: 90.3 kg (199 lb)    Height: 6' 1\" (1.854 m)    PainSc:  10 - Worst pain ever           PE:  AFVSS with elevated blood pressure, no acute distress, normal body habitus. A&OXs 3.  normocephalic, atraumatic. Conjugate gaze, clear sclerae  Gait is within functional limits with antalgia. Breathing is nonlabored. There is tenderness to palpation at the lateral margins of bilateral hips. Balance is within functional limits. Assessment/Plan:     ICD-10-CM ICD-9-CM    1. Trochanteric bursitis of both hips M70.61 726.5 CO DRAIN/INJECT LARGE JOINT/BURSA    M70.62  TRIAMCINOLONE ACETONIDE INJ      triamcinolone acetonide (KENALOG) 40 mg/mL injection      lidocaine (XYLOCAINE) 10 mg/mL (1 %) injection      LIDOCAINE INJECTION   2.  Myalgia M79.10 729.1 cyclobenzaprine (FLEXERIL) 10 mg tablet        Patient presents today for bilateral greater trochanteric bursa injections. Risks, benefits, alternatives were discussed and all questions were answered. Patient agrees to proceed with the intervention. Patient tolerated the procedure without complaints. There were no immediate complications. F/u:. Follow-up and Dispositions    · Return in about 2 months (around 11/10/2019), or if symptoms worsen or fail to improve. Follow-up via telephone as instructed. Follow-up as needed for procedure related concerns. Maintain regularly scheduled office visit. Procedure Note  [facility]  OFFICE PROCEDURE PROGRESS NOTE    Chart reviewed for the following:   Vandana GOMES DO, have reviewed the History, Physical and updated the Allergic reactions for [address] [place] [address],[address] performed immediately prior to start of procedure:   Vandana GOMES DO, have performed the following reviews on [facility] prior to the start of the procedure:            * Patient was identified by name and date of birth   * Agreement on procedure being performed was verified  * Risks and Benefits explained to the patient  * Procedure site verified and marked as necessary  * Patient was positioned for comfort  * Consent was signed and verified     Time: 1050    Date of procedure: 9/10/2019    Procedure performed by:  Vandana Rodriguez DO    Provider assisted by: Mary Kay Nascimento LPN    Patient assisted by: self    Post Procedural Pain Scale: See procedure note. Comments: none, See note for details. Procedure Note    Bilateral greater Trochanteric Bursa Injection    Patient was positioned in side-lying with the affected hip up and exposed. Point of maximum tenderness was identified and marked. Timeout was performed. Area was prepped and draped in the usual sterile fashion. Injection site was pretreated with vapo coolant spray.   Then following negative aspiration 3 mL of a solution of 40 mg of Kenalog mixed with 1% lidocaine was injected using a 2 in 25G needle. There was no blood loss there were no immediate complications. Area was covered with a bandage. Postprocedure pain relief with provocation was noted at bilateral hips down to 7/10 from 10/10. Patient was monitored for 20 minutes postprocedure and discharged in stable condition. Current Outpatient Medications on File Prior to Visit   Medication Sig    levothyroxine (SYNTHROID) 25 mcg tablet TAKE 1 TABLET BY MOUTH DAILY BEFORE BREAKFAST    levothyroxine (SYNTHROID) 175 mcg tablet TAKE 1 TABLET BY MOUTH ONCE DAILY BEFORE BREAKFAST    lisinopril-hydroCHLOROthiazide (PRINZIDE, ZESTORETIC) 20-12.5 mg per tablet TAKE 1 TABLET BY MOUTH DAILY    Blood Glucose Control, High (EMBRACE GLUCOSE CONTROL HIGH) soln Use with glucometer as directed.  Blood Glucose Control, Low (EMBRACE GLUCOSE CONTROL LOW) soln Use with glucometer as directed.  acetaminophen (TYLENOL EXTRA STRENGTH) 500 mg tablet Take 1,000 mg by mouth every six (6) hours as needed for Pain.  hydrOXYzine pamoate (VISTARIL) 25 mg capsule Take 1 Cap by mouth three (3) times daily as needed for Itching. No current facility-administered medications on file prior to visit.           Primary Care Physician  Yudith Welch  [address] [facility]  [address]  [phone]

## 2019-09-13 ENCOUNTER — TELEPHONE (OUTPATIENT)
Dept: FAMILY MEDICINE CLINIC | Age: 39
End: 2019-09-13

## 2019-09-13 ENCOUNTER — APPOINTMENT (OUTPATIENT)
Dept: GENERAL RADIOLOGY | Age: 39
End: 2019-09-13
Attending: EMERGENCY MEDICINE
Payer: MEDICARE

## 2019-09-13 ENCOUNTER — HOSPITAL ENCOUNTER (EMERGENCY)
Age: 39
Discharge: HOME | End: 2019-09-13
Attending: EMERGENCY MEDICINE
Payer: MEDICARE

## 2019-09-13 VITALS
TEMPERATURE: 98.7 F | HEART RATE: 77 BPM | SYSTOLIC BLOOD PRESSURE: 130 MMHG | RESPIRATION RATE: 15 BRPM | DIASTOLIC BLOOD PRESSURE: 75 MMHG | OXYGEN SATURATION: 100 %

## 2019-09-13 DIAGNOSIS — R55 SYNCOPE AND COLLAPSE: Primary | ICD-10-CM

## 2019-09-13 DIAGNOSIS — S93.402A SPRAIN OF LEFT ANKLE, UNSPECIFIED LIGAMENT, INITIAL ENCOUNTER: ICD-10-CM

## 2019-09-13 LAB
ANION GAP SERPL CALC-SCNC: 5 MMOL/L (ref 3–18)
APPEARANCE UR: CLEAR
ATRIAL RATE: 72 BPM
BASOPHILS # BLD: 0 K/UL (ref 0–0.1)
BASOPHILS NFR BLD: 0 % (ref 0–2)
BILIRUB UR QL: NEGATIVE
BUN SERPL-MCNC: 9 MG/DL (ref 7–18)
BUN/CREAT SERPL: 10 (ref 12–20)
CALCIUM SERPL-MCNC: 8.6 MG/DL (ref 8.5–10.1)
CALCULATED P AXIS, ECG09: 61 DEGREES
CALCULATED R AXIS, ECG10: 27 DEGREES
CALCULATED T AXIS, ECG11: 21 DEGREES
CHLORIDE SERPL-SCNC: 105 MMOL/L (ref 100–111)
CO2 SERPL-SCNC: 27 MMOL/L (ref 21–32)
COLOR UR: YELLOW
CREAT SERPL-MCNC: 0.94 MG/DL (ref 0.6–1.3)
DIAGNOSIS, 93000: NORMAL
DIFFERENTIAL METHOD BLD: ABNORMAL
EOSINOPHIL # BLD: 0 K/UL (ref 0–0.4)
EOSINOPHIL NFR BLD: 0 % (ref 0–5)
ERYTHROCYTE [DISTWIDTH] IN BLOOD BY AUTOMATED COUNT: 14.4 % (ref 11.6–14.5)
GLUCOSE SERPL-MCNC: 111 MG/DL (ref 74–99)
GLUCOSE UR STRIP.AUTO-MCNC: NEGATIVE MG/DL
HCG SERPL QL: NEGATIVE
HCT VFR BLD AUTO: 33.8 % (ref 35–45)
HGB BLD-MCNC: 11.6 G/DL (ref 12–16)
HGB UR QL STRIP: NEGATIVE
KETONES UR QL STRIP.AUTO: NEGATIVE MG/DL
LEUKOCYTE ESTERASE UR QL STRIP.AUTO: NEGATIVE
LYMPHOCYTES # BLD: 2.4 K/UL (ref 0.9–3.6)
LYMPHOCYTES NFR BLD: 25 % (ref 21–52)
MCH RBC QN AUTO: 27.2 PG (ref 24–34)
MCHC RBC AUTO-ENTMCNC: 34.3 G/DL (ref 31–37)
MCV RBC AUTO: 79.2 FL (ref 74–97)
MONOCYTES # BLD: 0.4 K/UL (ref 0.05–1.2)
MONOCYTES NFR BLD: 4 % (ref 3–10)
NEUTS SEG # BLD: 6.8 K/UL (ref 1.8–8)
NEUTS SEG NFR BLD: 71 % (ref 40–73)
NITRITE UR QL STRIP.AUTO: NEGATIVE
P-R INTERVAL, ECG05: 152 MS
PH UR STRIP: 7.5 [PH] (ref 5–8)
PLATELET # BLD AUTO: 377 K/UL (ref 135–420)
PMV BLD AUTO: 10 FL (ref 9.2–11.8)
POTASSIUM SERPL-SCNC: 3.7 MMOL/L (ref 3.5–5.5)
PROT UR STRIP-MCNC: NEGATIVE MG/DL
Q-T INTERVAL, ECG07: 390 MS
QRS DURATION, ECG06: 78 MS
QTC CALCULATION (BEZET), ECG08: 427 MS
RBC # BLD AUTO: 4.27 M/UL (ref 4.2–5.3)
SODIUM SERPL-SCNC: 137 MMOL/L (ref 136–145)
SP GR UR REFRACTOMETRY: 1.01 (ref 1–1.03)
UROBILINOGEN UR QL STRIP.AUTO: 0.2 EU/DL (ref 0.2–1)
VENTRICULAR RATE, ECG03: 72 BPM
WBC # BLD AUTO: 9.7 K/UL (ref 4.6–13.2)

## 2019-09-13 PROCEDURE — 84703 CHORIONIC GONADOTROPIN ASSAY: CPT

## 2019-09-13 PROCEDURE — 81003 URINALYSIS AUTO W/O SCOPE: CPT

## 2019-09-13 PROCEDURE — 93005 ELECTROCARDIOGRAM TRACING: CPT

## 2019-09-13 PROCEDURE — 73610 X-RAY EXAM OF ANKLE: CPT

## 2019-09-13 PROCEDURE — 99285 EMERGENCY DEPT VISIT HI MDM: CPT

## 2019-09-13 PROCEDURE — 80048 BASIC METABOLIC PNL TOTAL CA: CPT

## 2019-09-13 PROCEDURE — 85025 COMPLETE CBC W/AUTO DIFF WBC: CPT

## 2019-09-13 NOTE — DISCHARGE INSTRUCTIONS
Patient Education        Ankle Sprain: Care Instructions  Your Care Instructions    An ankle sprain can happen when you twist your ankle. The ligaments that support the ankle can get stretched and torn. Often the ankle is swollen and painful. Ankle sprains may take from several weeks to several months to heal. Usually, the more pain and swelling you have, the more severe your ankle sprain is and the longer it will take to heal. You can heal faster and regain strength in your ankle with good home treatment. It is very important to give your ankle time to heal completely, so that you do not easily hurt your ankle again. Follow-up care is a key part of your treatment and safety. Be sure to make and go to all appointments, and call your doctor if you are having problems. It's also a good idea to know your test results and keep a list of the medicines you take. How can you care for yourself at home? · Prop up your foot on pillows as much as possible for the next 3 days. Try to keep your ankle above the level of your heart. This will help reduce the swelling. · Follow your doctor's directions for wearing a splint or elastic bandage. Wrapping the ankle may help reduce or prevent swelling. · Your doctor may give you a splint, a brace, an air stirrup, or another form of ankle support to protect your ankle until it is healed. Wear it as directed while your ankle is healing. Do not remove it unless your doctor tells you to. After your ankle has healed, ask your doctor whether you should wear the brace when you exercise. · Put ice or cold packs on your injured ankle for 10 to 20 minutes at a time. Try to do this every 1 to 2 hours for the next 3 days (when you are awake) or until the swelling goes down. Put a thin cloth between the ice and your skin. · You may need to use crutches until you can walk without pain. If you do use crutches, try to bear some weight on your injured ankle if you can do so without pain.  This helps the ankle heal.  · Take pain medicines exactly as directed. ? If the doctor gave you a prescription medicine for pain, take it as prescribed. ? If you are not taking a prescription pain medicine, ask your doctor if you can take an over-the-counter medicine. · If you have been given ankle exercises to do at home, do them exactly as instructed. These can promote healing and help prevent lasting weakness. When should you call for help? Call your doctor now or seek immediate medical care if:    · Your pain is getting worse.     · Your swelling is getting worse.     · Your splint feels too tight or you are unable to loosen it.    Watch closely for changes in your health, and be sure to contact your doctor if:    · You are not getting better after 1 week. Where can you learn more? Go to http://ranjan-luis.info/. Enter Y425 in the search box to learn more about \"Ankle Sprain: Care Instructions. \"  Current as of: September 20, 2018  Content Version: 12.1  © 4711-3178 [employer]. Care instructions adapted under license by [employer] (which disclaims liability or warranty for this information). If you have questions about a medical condition or this instruction, always ask your healthcare professional. [place] [address] any warranty or liability for your use of this information. Patient Education        Fainting: Care Instructions  Your Care Instructions    When you faint, or pass out, you lose consciousness for a short time. A brief drop in blood flow to the brain often causes it. When you fall or lie down, more blood flows to your brain and you regain consciousness. Emotional stress, pain, or overheating--especially if you have been standing--can make you faint. In these cases, fainting is usually not serious. But fainting can be a sign of a more serious problem. Your doctor may want you to have more tests to rule out other causes.   The treatment you need depends on the reason why you fainted. The doctor has checked you carefully, but problems can develop later. If you notice any problems or new symptoms, get medical treatment right away. Follow-up care is a key part of your treatment and safety. Be sure to make and go to all appointments, and call your doctor if you are having problems. It's also a good idea to know your test results and keep a list of the medicines you take. How can you care for yourself at home? · Drink plenty of fluids to prevent dehydration. If you have kidney, heart, or liver disease and have to limit fluids, talk with your doctor before you increase your fluid intake. When should you call for help? Call 911 anytime you think you may need emergency care. For example, call if:    · You have symptoms of a heart problem. These may include:  ? Chest pain or pressure. ? Severe trouble breathing. ? A fast or irregular heartbeat. ? Lightheadedness or sudden weakness. ? Coughing up pink, foamy mucus. ? Passing out. After you call 911, the  may tell you to chew 1 adult-strength or 2 to 4 low-dose aspirin. Wait for an ambulance. Do not try to drive yourself.     · You have symptoms of a stroke. These may include:  ? Sudden numbness, tingling, weakness, or loss of movement in your face, arm, or leg, especially on only one side of your body. ? Sudden vision changes. ? Sudden trouble speaking. ? Sudden confusion or trouble understanding simple statements. ? Sudden problems with walking or balance. ? A sudden, severe headache that is different from past headaches.     · You passed out (lost consciousness) again.    Watch closely for changes in your health, and be sure to contact your doctor if:    · You do not get better as expected. Where can you learn more? Go to http://ranjan-luis.info/. Enter S716 in the search box to learn more about \"Fainting: Care Instructions. \"  Current as of: September 23, 2018  Content Version: 12.1  © 2080-6207 [employer]. Care instructions adapted under license by [employer] (which disclaims liability or warranty for this information). If you have questions about a medical condition or this instruction, always ask your healthcare professional. [address] any warranty or liability for your use of this information.

## 2019-09-13 NOTE — TELEPHONE ENCOUNTER
Patient called from the hospital and said that she was in the [facility] today standing in line when she fainted. Currently being treated in ED and will call when d/c to schedule a follow up appointment with pcp.  Stated that they would like her to wear a heart monitor and be referred to cardiology

## 2019-09-13 NOTE — ED PROVIDER NOTES
EMERGENCY DEPARTMENT HISTORY AND PHYSICAL EXAM  This was created with voice recognition software and transcription errors may be present. 2:57 PM  Date: 9/13/2019  Patient Name: Inge Ojeda    History of Presenting Illness     Chief Complaint:    History Provided By:     HPI: Inge Ojeda is a 37 y.o. female past medical history of back pain cervical vertebral uropathy chronic hip pain depression diabetes asthma bronchitis hypertension hyperparathyroid prediabetes sickle cell trait who presents with syncope. Patient states this is occurred over her life since she was in the ninth grade multiple times she is asked he passed out x5. She states she feels a weird sensation in her chest she can not describe she states clearly is not palpitations she has no chest pain no headache no shortness of breath no abdominal pain. She then feels either a ringing in her ear or has a hard time hearing and that she passes out. Sometimes this is preceded by seeing spots colors.     PCP: Jaki Hoff NP      Past History     Past Medical History:  Past Medical History:   Diagnosis Date    Back pain     Cervical paraspinal muscle spasm     Cervical radiculopathy     Chronic hip pain     Depression     Diabetes ([address]     History of asthma     History of bronchitis     Hypertension     Hypothyroid     Prediabetes     Sciatica     Sickle cell trait (HCC)        Past Surgical History:  Past Surgical History:   Procedure Laterality Date    HX CARPAL TUNNEL RELEASE Right     HX HYSTERECTOMY      HX ORTHOPAEDIC      cyst removed right wrist    HX THYROIDECTOMY         Family History:  Family History   Problem Relation Age of Onset    Asthma Mother     Tuberculosis Mother     Alcohol abuse Mother     Drug Abuse Mother     Diabetes Father     Asthma Father     Heart Disease Father     Sickle Cell Anemia Sister     Depression Brother     Lung Cancer Maternal Grandmother     Hypertension Maternal Grandmother     Colon Cancer Paternal Grandmother     Emphysema Paternal Grandmother     Depression Brother     Depression Brother     Depression Brother        Social History:  Social History     Tobacco Use    Smoking status: Current Every Day Smoker     Packs/day: 0.50    Smokeless tobacco: Never Used   Substance Use Topics    Alcohol use: No    Drug use: No       Allergies: Allergies   Allergen Reactions    Tramadol Nausea and Vomiting       Review of Systems     Review of Systems   All other systems reviewed and are negative. 10 point review of systems otherwise negative unless noted in HPI. Physical Exam       Physical Exam   Constitutional: She is oriented to person, place, and time. She appears well-developed. HENT:   Head: Normocephalic and atraumatic. Eyes: Pupils are equal, round, and reactive to light. EOM are normal.   Neck: Normal range of motion. Neck supple. Cardiovascular: Normal rate, regular rhythm and normal heart sounds. Exam reveals no friction rub. No murmur heard. Pulmonary/Chest: Effort normal and breath sounds normal. No respiratory distress. She has no wheezes. Abdominal: Soft. She exhibits no distension. There is no tenderness. There is no rebound and no guarding. Musculoskeletal: Normal range of motion. Wound tenderness to palpation and swelling to the left ankle   Neurological: She is alert and oriented to person, place, and time. Skin: Skin is warm and dry. Psychiatric: She has a normal mood and affect.  Her behavior is normal. Thought content normal.       Diagnostic Study Results     Vital Signs   Visit Vitals  /84   Pulse 70   Resp 19   SpO2 99%      EKG: EKG shows sinus at 72 with a normal axis and normal intervals there is no ST elevation or depression no hypertrophy  Labs:   Imaging:     Medical Decision Making     ED Course: Progress Notes, Reevaluation, and Consults:      Provider Notes (Medical Decision Making): 68-year-old female presents with syncopal event. This is happened throughout most of her adult life. She is actually passed out x5. The primary is referred her to neurology. I think she may also benefit from a cardiology eval with may be a Holter monitor. She has frequent episodes of lightheadedness and feeling like she is about to pass out. During this time she has no palpitations or pain the Holter monitor may actually help clarify if these are PVCs or sinus bradycardia or related to her rhythm. X-ray is negative labs are negative hCG is negative. Patient also complained of some right knee pain after the event. Negative valgus negative varus stress negative anterior posterior drawer. I do not think she needs imaging. Will place on crutches splint her ankle and then referral to cardiology for syncope. Diagnosis     Clinical Impression: No diagnosis found. Disposition:    Patient's Medications   Start Taking    No medications on file   Continue Taking    ACETAMINOPHEN (TYLENOL EXTRA STRENGTH) 500 MG TABLET    Take 1,000 mg by mouth every six (6) hours as needed for Pain. BLOOD GLUCOSE CONTROL, HIGH (EMBRACE GLUCOSE CONTROL HIGH) SOLN    Use with glucometer as directed. BLOOD GLUCOSE CONTROL, LOW (EMBRACE GLUCOSE CONTROL LOW) SOLN    Use with glucometer as directed. CYCLOBENZAPRINE (FLEXERIL) 10 MG TABLET    Take 1 Tab by mouth three (3) times daily as needed for Muscle Spasm(s). HYDROXYZINE PAMOATE (VISTARIL) 25 MG CAPSULE    Take 1 Cap by mouth three (3) times daily as needed for Itching.     LEVOTHYROXINE (SYNTHROID) 175 MCG TABLET    TAKE 1 TABLET BY MOUTH ONCE DAILY BEFORE BREAKFAST    LEVOTHYROXINE (SYNTHROID) 25 MCG TABLET    TAKE 1 TABLET BY MOUTH DAILY BEFORE BREAKFAST    LISINOPRIL-HYDROCHLOROTHIAZIDE (PRINZIDE, ZESTORETIC) 20-12.5 MG PER TABLET    TAKE 1 TABLET BY MOUTH DAILY   These Medications have changed    No medications on file   Stop Taking    No medications on file

## 2019-09-13 NOTE — ED NOTES
I have reviewed discharge instructions with the patient. The patient verbalized understanding. Patient was given the opportunity to ask questions and she stated that she did not have any at this time.

## 2019-09-13 NOTE — ED TRIAGE NOTES
Patient arrived via EMS from [facility]. Per EMS, patient had a syncopal episode and is complaining of left ankle pain. Patient does have a history of syncopal episodes and is being followed by a PCP.

## 2019-09-16 ENCOUNTER — TELEPHONE (OUTPATIENT)
Dept: FAMILY MEDICINE CLINIC | Age: 39
End: 2019-09-16

## 2019-09-16 ENCOUNTER — OFFICE VISIT (OUTPATIENT)
Dept: ORTHOPEDIC SURGERY | Age: 39
End: 2019-09-16

## 2019-09-16 VITALS
SYSTOLIC BLOOD PRESSURE: 125 MMHG | TEMPERATURE: 98.3 F | HEIGHT: 72 IN | HEART RATE: 85 BPM | RESPIRATION RATE: 16 BRPM | DIASTOLIC BLOOD PRESSURE: 82 MMHG | WEIGHT: 199 LBS | BODY MASS INDEX: 26.95 KG/M2

## 2019-09-16 DIAGNOSIS — S93.402A SPRAIN OF LEFT ANKLE, UNSPECIFIED LIGAMENT, INITIAL ENCOUNTER: Primary | ICD-10-CM

## 2019-09-16 DIAGNOSIS — M25.561 RIGHT KNEE PAIN, UNSPECIFIED CHRONICITY: ICD-10-CM

## 2019-09-16 DIAGNOSIS — S83.91XA SPRAIN OF RIGHT KNEE, UNSPECIFIED LIGAMENT, INITIAL ENCOUNTER: ICD-10-CM

## 2019-09-16 NOTE — PROGRESS NOTES
1. Have you been to the ER, urgent care clinic since your last visit? Hospitalized since your last visit? No    2. Have you seen or consulted any other health care providers outside of the [address] [place] [address] since your last visit? Include any pap smears or colon screening.  No

## 2019-09-16 NOTE — TELEPHONE ENCOUNTER
Spoke with patient to let her know I did call a couple of Neurology offices in the area and they are all scheduling into Oct. Dr Cici Dixon office did put patient on a cancellation list. Patient also will call her [employer] ins to see what GI is in network she is willing to go to.  [facility] is out of network with ins

## 2019-09-16 NOTE — PROGRESS NOTES
Arsen Melara  1980   Chief Complaint   Patient presents with    Knee Pain     right knee pain    Ankle Pain     left ankle pain        HISTORY OF PRESENT ILLNESS  Arsen Melara is a 37 y.o. female who presents today for evaluation of right knee pain. She rates her pain 9/10 today. Pain has been present since fainting and having a seizure while at the [facility] on 9/13/19. Pt has hx of multiple fainting and dizzy spells and has an upcoming appointment with a neurologist for this. Pt presents today with crutches. Patient describes the pain as aching, throbbing and dull that is Constant in nature. Symptoms are worse with bending the leg, Activity and is better with  Brace and pillows, Heat, Ice. Associated symptoms include nothing. Since problem started, it: is unchanged. Pain does not wake patient up at night. Has taken no meds for the problem. Additional complaint of left ankle pain. Pt reports constant pain in ankle and reports ankle pain is worse than the knee pain today. Has tried following treatments: Injections:NO; Brace:NO;  Therapy:NO; Cane/Crutch:NO       Allergies   Allergen Reactions    Tramadol Nausea and Vomiting        Past Medical History:   Diagnosis Date    Back pain     Cervical paraspinal muscle spasm     Cervical radiculopathy     Chronic hip pain     Depression     [facility])     History of asthma     History of bronchitis     Hypertension     Hypothyroid     Prediabetes     Sciatica     Sickle cell trait ([facility])       Social History     Socioeconomic History    Marital status:      Spouse name: Not on file    Number of children: Not on file    Years of education: Not on file    Highest education level: Not on file   Occupational History    Not on file   Social Needs    Financial resource strain: Not on file    Food insecurity:     Worry: Not on file     Inability: Not on file    Transportation needs:     Medical: Not on file     Non-medical: Not on file   Tobacco Use    Smoking status: Current Every Day Smoker     Packs/day: 0.50    Smokeless tobacco: Never Used   Substance and Sexual Activity    Alcohol use: No    Drug use: No    Sexual activity: Not on file   Lifestyle    Physical activity:     Days per week: Not on file     Minutes per session: Not on file    Stress: Not on file   Relationships    Social connections:     Talks on phone: Not on file     Gets together: Not on file     Attends Muslim service: Not on file     Active member of club or organization: Not on file     Attends meetings of clubs or organizations: Not on file     Relationship status: Not on file    Intimate partner violence:     Fear of current or ex partner: Not on file     Emotionally abused: Not on file     Physically abused: Not on file     Forced sexual activity: Not on file   Other Topics Concern    Not on file   Social History Narrative    Not on file      Past Surgical History:   Procedure Laterality Date    HX CARPAL TUNNEL RELEASE Right     HX HYSTERECTOMY      HX ORTHOPAEDIC      cyst removed right wrist    HX THYROIDECTOMY        Family History   Problem Relation Age of Onset    Asthma Mother     Tuberculosis Mother     Alcohol abuse Mother     Drug Abuse Mother     Diabetes Father     Asthma Father     Heart Disease Father     Sickle Cell Anemia Sister     Depression Brother     Lung Cancer Maternal Grandmother     Hypertension Maternal Grandmother     Colon Cancer Paternal Grandmother     Emphysema Paternal Grandmother     Depression Brother     Depression Brother     Depression Brother       Current Outpatient Medications   Medication Sig    lisinopril-hydroCHLOROthiazide (PRINZIDE, ZESTORETIC) 20-12.5 mg per tablet TAKE 1 TABLET BY MOUTH DAILY    cyclobenzaprine (FLEXERIL) 10 mg tablet Take 1 Tab by mouth three (3) times daily as needed for Muscle Spasm(s).     levothyroxine (SYNTHROID) 25 mcg tablet TAKE 1 TABLET BY MOUTH DAILY BEFORE BREAKFAST    levothyroxine (SYNTHROID) 175 mcg tablet TAKE 1 TABLET BY MOUTH ONCE DAILY BEFORE BREAKFAST    hydrOXYzine pamoate (VISTARIL) 25 mg capsule Take 1 Cap by mouth three (3) times daily as needed for Itching.  Blood Glucose Control, High (EMBRACE GLUCOSE CONTROL HIGH) soln Use with glucometer as directed.  Blood Glucose Control, Low (EMBRACE GLUCOSE CONTROL LOW) soln Use with glucometer as directed.  acetaminophen (TYLENOL EXTRA STRENGTH) 500 mg tablet Take 1,000 mg by mouth every six (6) hours as needed for Pain. No current facility-administered medications for this visit. REVIEW OF SYSTEM   Patient denies: Weight loss, Fever/Chills, HA, Visual changes, Fatigue, Chest pain, SOB, Abdominal pain, N/V/D/C, Blood in stool or urine, Edema. Pertinent positive as above in HPI. All others were negative    PHYSICAL EXAM:   Visit Vitals  /82   Pulse 85   Temp 98.3 °F (36.8 °C) (Oral)   Resp 16   Ht 6' 1\" (1.854 m)   Wt 199 lb (90.3 kg)   BMI 26.25 kg/m²     The patient is a well-developed, well-nourished female   in no acute distress. The patient is alert and oriented times three. The patient is alert and oriented times three. Mood and affect are normal.  LYMPHATIC: lymph nodes are not enlarged and are within normal limits  SKIN: normal in color and non tender to palpation. There are no bruises or abrasions noted. NEUROLOGICAL: Motor sensory exam is within normal limits. Reflexes are equal bilaterally.  There is normal sensation to pinprick and light touch  MUSCULOSKELETAL:   Examination Right knee   Skin Intact   Range of motion 0-130   Effusion -   Medial joint line tenderness -   Lateral joint line tenderness -   Tenderness Pes Bursa -   Tenderness insertion MCL -   Tenderness insertion LCL -   Lamars -   Patella crepitus -   Patella grind -   Lachman -   Pivot shift -   Anterior drawer -   Posterior drawer -   Varus stress -   Valgus stress -   Neurovascular Intact Calf Swelling and Tenderness to Palpation -   Sixto's Test -   Hamstring Cord Tightness -       Examination Left Ankle/Foot   Skin Intact   Swelling +   Dorsiflexion 10   Plantarflexion 25   Deformity -   Inversion laxity -   Anterior drawer -   Medial malleolus tenderness -   Lateral malleolus tenderness +   Heel cord Intact   Heel cord Tightness -   Chahal's Test -   Sixto's Test -   Sensation Intact   Bunion -   Capillary refill Normal       IMAGING: XR of right knee dated 9/16/19 was reviewed and read: No acute abnormalities       XR of left ankle dated 9/13/19 was reviewed and read: No acute radiographic findings. IMPRESSION:      ICD-10-CM ICD-9-CM    1. Sprain of left ankle, unspecified ligament, initial encounter S93.402A 845.00    2. Right knee pain, unspecified chronicity M25.561 719.46 AMB POC XRAY, KNEE; 1/2 VIEWS   3. Sprain of right knee, unspecified ligament, initial encounter S83. 91XA 844.9         PLAN:  1. Pt presents today with right knee and left ankle pain due to a knee sprain and ankle sprain and she was given a short boot today. I would like to see her back in two weeks. Risk factors include: dm, htn  2. No ultrasound exam indicated today  3. No cortisone injection indicated today   4. No Physical/Occupational Therapy indicated today  5. No diagnostic test indicated today:   6. Yes durable medical equipment indicated today SHORT BOOT  7. No referral indicated today   8. No medications indicated today:   9. No Narcotic indicated today      RTC 2 weeks      Scribed by [facility]  [facility] [address]) as dictated by Rebekah Mills MD    I, Dr. Rebekah Mills, confirm that all documentation is accurate.     Rebekah Mills M.D.   [facility]

## 2019-09-16 NOTE — TELEPHONE ENCOUNTER
Patient was seen at [facility] ER over the weekend due to she fainted on Friday. Patient was told by er doctor she should see a cardiologist and get a heart monitor and orthopedic. She is scheduled to see neurology in Oct but does not think she should wait until then. Please advise.

## 2019-09-17 ENCOUNTER — OFFICE VISIT (OUTPATIENT)
Dept: FAMILY MEDICINE CLINIC | Age: 39
End: 2019-09-17

## 2019-09-17 VITALS
DIASTOLIC BLOOD PRESSURE: 90 MMHG | OXYGEN SATURATION: 97 % | HEART RATE: 94 BPM | TEMPERATURE: 98 F | SYSTOLIC BLOOD PRESSURE: 137 MMHG | BODY MASS INDEX: 26.95 KG/M2 | RESPIRATION RATE: 16 BRPM | HEIGHT: 72 IN | WEIGHT: 199 LBS

## 2019-09-17 DIAGNOSIS — R55 SYNCOPE, UNSPECIFIED SYNCOPE TYPE: Primary | ICD-10-CM

## 2019-09-17 DIAGNOSIS — E03.2 HYPOTHYROIDISM DUE TO NON-MEDICATION EXOGENOUS SUBSTANCES: ICD-10-CM

## 2019-09-17 NOTE — PROGRESS NOTES
HISTORY OF PRESENT ILLNESS    Glenis Arias is a 37y.o. year old female comes in today for ED follow up:  Syncope    Patient was seen in the ED on 9/13/19 for syncope and collapse.  ED 9/13/19  HPI: Inge Ojeda is a 37 y.o. female past medical history of back pain cervical vertebral uropathy chronic hip pain depression diabetes asthma bronchitis hypertension hyperparathyroid prediabetes sickle cell trait who presents with syncope. Patient states this is occurred over her life since she was in the ninth grade multiple times she is asked he passed out x5. She states she feels a weird sensation in her chest she can not describe she states clearly is not palpitations she has no chest pain no headache no shortness of breath no abdominal pain. She then feels either a ringing in her ear or has a hard time hearing and that she passes out. Sometimes this is preceded by seeing spots colors. ED Course: Progress Notes, Reevaluation, and Consults:      Provider Notes (Medical Decision Making): 67-year-old female presents with syncopal event. This is happened throughout most of her adult life. She is actually passed out x5. The primary is referred her to neurology. I think she may also benefit from a cardiology eval with may be a Holter monitor. She has frequent episodes of lightheadedness and feeling like she is about to pass out. During this time she has no palpitations or pain the Holter monitor may actually help clarify if these are PVCs or sinus bradycardia or related to her rhythm.     X-ray is negative labs are negative hCG is negative. Patient also complained of some right knee pain after the event. Negative valgus negative varus stress negative anterior posterior drawer. I do not think she needs imaging. Will place on crutches splint her ankle and then referral to cardiology for syncope.       Patient states she when was in [facility] and fainting.   Comments she felt hot, decreased hearing, reports she saw whiteness, dizziness. Comments these symptoms are the same she has had in the past.  Reports symptoms are becoming more frequent. Reports after fainting she will have a 'funny' feeling that she describes as if she is falling or riding on a roller coaster. Allergies   Allergen Reactions    Tramadol Nausea and Vomiting     Current Outpatient Medications   Medication Sig Dispense Refill    lisinopril-hydroCHLOROthiazide (PRINZIDE, ZESTORETIC) 20-12.5 mg per tablet TAKE 1 TABLET BY MOUTH DAILY 90 Tab 0    cyclobenzaprine (FLEXERIL) 10 mg tablet Take 1 Tab by mouth three (3) times daily as needed for Muscle Spasm(s). 90 Tab 3    levothyroxine (SYNTHROID) 25 mcg tablet TAKE 1 TABLET BY MOUTH DAILY BEFORE BREAKFAST 30 Tab 0    levothyroxine (SYNTHROID) 175 mcg tablet TAKE 1 TABLET BY MOUTH ONCE DAILY BEFORE BREAKFAST 30 Tab 0    acetaminophen (TYLENOL EXTRA STRENGTH) 500 mg tablet Take 1,000 mg by mouth every six (6) hours as needed for Pain.        Past Medical History:   Diagnosis Date    Back pain     Cervical paraspinal muscle spasm     Cervical radiculopathy     Chronic hip pain     Depression     Diabetes (HCC)     History of asthma     History of bronchitis     Hypertension     Hypothyroid     Prediabetes     Sciatica     Sickle cell trait ([facility] [address]        Admission on 09/13/2019, Discharged on 09/13/2019   Component Date Value Ref Range Status    WBC 09/13/2019 9.7  4.6 - 13.2 K/uL Final    RBC 09/13/2019 4.27  4.20 - 5.30 M/uL Final    HGB 09/13/2019 11.6* 12.0 - 16.0 g/dL Final    HCT 09/13/2019 33.8* 35.0 - 45.0 % Final    MCV 09/13/2019 79.2  74.0 - 97.0 FL Final    MCH 09/13/2019 27.2  24.0 - 34.0 PG Final    MCHC 09/13/2019 34.3  31.0 - 37.0 g/dL Final    RDW 09/13/2019 14.4  11.6 - 14.5 % Final    PLATELET 70/97/9933 290  135 - 420 K/uL Final    MPV 09/13/2019 10.0  9.2 - 11.8 FL Final    NEUTROPHILS 09/13/2019 71  40 - 73 % Final    LYMPHOCYTES 09/13/2019 25  21 - 52 % Final  MONOCYTES 09/13/2019 4  3 - 10 % Final    EOSINOPHILS 09/13/2019 0  0 - 5 % Final    BASOPHILS 09/13/2019 0  0 - 2 % Final    ABS. NEUTROPHILS 09/13/2019 6.8  1.8 - 8.0 K/UL Final    ABS. LYMPHOCYTES 09/13/2019 2.4  0.9 - 3.6 K/UL Final    ABS. MONOCYTES 09/13/2019 0.4  0.05 - 1.2 K/UL Final    ABS. EOSINOPHILS 09/13/2019 0.0  0.0 - 0.4 K/UL Final    ABS. BASOPHILS 09/13/2019 0.0  0.0 - 0.1 K/UL Final    DF 09/13/2019 AUTOMATED    Final    Sodium 09/13/2019 137  136 - 145 mmol/L Final    Potassium 09/13/2019 3.7  3.5 - 5.5 mmol/L Final    Chloride 09/13/2019 105  100 - 111 mmol/L Final    CO2 09/13/2019 27  21 - 32 mmol/L Final    Anion gap 09/13/2019 5  3.0 - 18 mmol/L Final    Glucose 09/13/2019 111* 74 - 99 mg/dL Final    BUN 09/13/2019 9  7.0 - 18 MG/DL Final    Creatinine 09/13/2019 0.94  0.6 - 1.3 MG/DL Final    BUN/Creatinine ratio 09/13/2019 10* 12 - 20   Final    GFR est AA 09/13/2019 >60  >60 ml/min/1.73m2 Final    GFR est non-AA 09/13/2019 >60  >60 ml/min/1.73m2 Final    Comment: (NOTE)  Estimated GFR is calculated using the Modification of Diet in Renal   Disease (MDRD) Study equation, reported for both  Americans   (GFRAA) and non- Americans (GFRNA), and normalized to 1.73m2   body surface area. The physician must decide which value applies to   the patient. The MDRD study equation should only be used in   individuals age 25 or older. It has not been validated for the   following: pregnant women, patients with serious comorbid conditions,   or on certain medications, or persons with extremes of body size,   muscle mass, or nutritional status.       Calcium 09/13/2019 8.6  8.5 - 10.1 MG/DL Final    Color 09/13/2019 YELLOW    Final    Appearance 09/13/2019 CLEAR    Final    Specific gravity 09/13/2019 1.010  1.005 - 1.030   Final    pH (UA) 09/13/2019 7.5  5.0 - 8.0   Final    Protein 09/13/2019 NEGATIVE   NEG mg/dL Final    Glucose 09/13/2019 NEGATIVE   NEG mg/dL Final    Ketone 09/13/2019 NEGATIVE   NEG mg/dL Final    Bilirubin 09/13/2019 NEGATIVE   NEG   Final    Blood 09/13/2019 NEGATIVE   NEG   Final    Urobilinogen 09/13/2019 0.2  0.2 - 1.0 EU/dL Final    Nitrites 09/13/2019 NEGATIVE   NEG   Final    Leukocyte Esterase 09/13/2019 NEGATIVE   NEG   Final    Ventricular Rate 09/13/2019 72  BPM Final    Atrial Rate 09/13/2019 72  BPM Final    P-R Interval 09/13/2019 152  ms Final    QRS Duration 09/13/2019 78  ms Final    Q-T Interval 09/13/2019 390  ms Final    QTC Calculation (Bezet) 09/13/2019 427  ms Final    Calculated P Axis 09/13/2019 61  degrees Final    Calculated R Axis 09/13/2019 27  degrees Final    Calculated T Axis 09/13/2019 21  degrees Final    Diagnosis 09/13/2019    Final                    Value:Normal sinus rhythm  Normal ECG  No previous ECGs available  Confirmed by Rodrigo Valencia MD, Brandon Contreras ([id number]) on 9/13/2019 5:11:05 PM      HCG, Ql. 09/13/2019 NEGATIVE   NEG   Final    Test results should be confirmed using serum quantitative hCG when detection of pregnancy is critical and before performing any critical medical procedure. XR Results (maximum last 3): Results from [place] encounter on 09/13/19   XR ANKLE LT MIN 3 V    Impression IMPRESSION:    No acute radiographic findings. Results from [place] encounter on 03/21/19   XR SPINE THORAC 2 V    Impression Findings/impression:    No acute osseous abnormality. No acute lung finding. There is minimal  rotoscoliosis noted of the thoracolumbar spine, incompletely visualized. Minimal  osseous degenerative changes noted. MRI would be required for further  clarification. XR SPINE CERV PA LAT ODONT 3 V MAX    Impression IMPRESSION:    No acute osseous findings. Moderate multilevel degenerative disc disease. Loss of cervical lordosis and partially evaluated as shaped scoliosis. ROS:  Review of Systems   Constitutional: Negative for chills and fever. Respiratory: Negative for shortness of breath. Cardiovascular: Negative for chest pain and palpitations. Neurological: Positive for dizziness (intermittent) and headaches (intermittent). /90 (BP 1 Location: Left arm, BP Patient Position: Sitting)   Pulse 94   Temp 98 °F (36.7 °C) (Oral)   Resp 16   Ht 6' 1\" (1.854 m)   Wt 199 lb (90.3 kg)   SpO2 97%   BMI 26.25 kg/m²     Objective:  General appearance - alert, well appearing, and in no distress  Neck - supple, no significant adenopathy  Chest - clear to auscultation, no wheezes, rales or rhonchi, symmetric air entry  Heart - normal rate, regular rhythm, normal S1, S2, no murmurs, rubs, clicks or gallops  Abdomen: soft, non-tender. Bowel sounds normal. No masses,  no organomegaly  Neurologic: Alert and oriented X 3, normal strength and tone. Normal symmetric reflexes. Normal coordination and gait          Assessment/Plan:     ICD-10-CM ICD-9-CM    1. Syncope, unspecified syncope type R55 780.2 REFERRAL TO CARDIOLOGY   2. Hypothyroidism due to non-medication exogenous substances E03.2 244. 3      Advised to ensure she follows up with neurologist, verbalizes understanding. I have discussed the diagnosis with the patient and the intended plan as seen in the above orders. The patient has received an after-visit summary and questions were answered concerning future plans. I have discussed medication side effects and warnings with the patient as well. Patient agreeable with above plan and verbalizes understanding. Follow-up and Dispositions    · Return in about 6 weeks (around 10/29/2019) for HTN/thyroid.

## 2019-09-17 NOTE — PATIENT INSTRUCTIONS
Lightheadedness or Faintness: Care Instructions  Your Care Instructions  Lightheadedness is a feeling that you are about to faint or \"pass out. \" You do not feel as if you or your surroundings are moving. It is different from vertigo, which is the feeling that you or things around you are spinning or tilting. Lightheadedness usually goes away or gets better when you lie down. If lightheadedness gets worse, it can lead to a fainting spell. It is common to feel lightheaded from time to time. Lightheadedness usually is not caused by a serious problem. It often is caused by a short-lasting drop in blood pressure and blood flow to your head that occurs when you get up too quickly from a seated or lying position. Follow-up care is a key part of your treatment and safety. Be sure to make and go to all appointments, and call your doctor if you are having problems. It's also a good idea to know your test results and keep a list of the medicines you take. How can you care for yourself at home? · Lie down for 1 or 2 minutes when you feel lightheaded. After lying down, sit up slowly and remain sitting for 1 to 2 minutes before slowly standing up. · Avoid movements, positions, or activities that have made you lightheaded in the past.  · Get plenty of rest, especially if you have a cold or flu, which can cause lightheadedness. · Make sure you drink plenty of fluids, especially if you have a fever or have been sweating. · Do not drive or put yourself and others in danger while you feel lightheaded. When should you call for help? Call 911 anytime you think you may need emergency care. For example, call if:    · You have symptoms of a stroke. These may include:  ? Sudden numbness, tingling, weakness, or loss of movement in your face, arm, or leg, especially on only one side of your body. ? Sudden vision changes. ? Sudden trouble speaking. ? Sudden confusion or trouble understanding simple statements.   ? Sudden problems with walking or balance. ? A sudden, severe headache that is different from past headaches.     · You have symptoms of a heart attack. These may include:  ? Chest pain or pressure, or a strange feeling in the chest.  ? Sweating. ? Shortness of breath. ? Nausea or vomiting. ? Pain, pressure, or a strange feeling in the back, neck, jaw, or upper belly or in one or both shoulders or arms. ? Lightheadedness or sudden weakness. ? A fast or irregular heartbeat. After you call 911, the  may tell you to chew 1 adult-strength or 2 to 4 low-dose aspirin. Wait for an ambulance. Do not try to drive yourself.    Watch closely for changes in your health, and be sure to contact your doctor if:    · Your lightheadedness gets worse or does not get better with home care. Where can you learn more? Go to http://ranjan-luis.info/. Enter J873 in the search box to learn more about \"Lightheadedness or Faintness: Care Instructions. \"  Current as of: September 23, 2018  Content Version: 12.1  © 1592-6873 [employer]. Care instructions adapted under license by [employer] (which disclaims liability or warranty for this information). If you have questions about a medical condition or this instruction, always ask your healthcare professional. [address] any warranty or liability for your use of this information.

## 2019-09-17 NOTE — PROGRESS NOTES
Chief Complaint   Patient presents with   [facility] Follow Up     ER Visit    Results     Requesting lab results from 9/5     1. Have you been to the ER, urgent care clinic since your last visit? Hospitalized since your last visit? No    2. Have you seen or consulted any other health care providers outside of the [address] [place] [address] since your last visit? Include any pap smears or colon screening.  Followed by Mihai Eldridge

## 2019-09-18 ENCOUNTER — TELEPHONE (OUTPATIENT)
Dept: FAMILY MEDICINE CLINIC | Age: 39
End: 2019-09-18

## 2019-09-18 NOTE — TELEPHONE ENCOUNTER
Patient calling stated the Endocrine office she was referred to is too far. Patient stated she found a new endo that's closer and that accepts her insurance.  Patient would like to know if she can have referral sent to   [facility]  [address]  [phone]    pls advise

## 2019-09-24 ENCOUNTER — TELEPHONE (OUTPATIENT)
Dept: FAMILY MEDICINE CLINIC | Age: 39
End: 2019-09-24

## 2019-09-24 NOTE — TELEPHONE ENCOUNTER
Pt wanted you to know that she is in the hospital at [facility] for appendicitis and several constipation she is in room 338 just yoli

## 2019-09-30 ENCOUNTER — OFFICE VISIT (OUTPATIENT)
Dept: FAMILY MEDICINE CLINIC | Age: 39
End: 2019-09-30

## 2019-09-30 VITALS
HEART RATE: 96 BPM | WEIGHT: 199 LBS | OXYGEN SATURATION: 98 % | TEMPERATURE: 98.4 F | BODY MASS INDEX: 26.95 KG/M2 | SYSTOLIC BLOOD PRESSURE: 131 MMHG | RESPIRATION RATE: 16 BRPM | HEIGHT: 72 IN | DIASTOLIC BLOOD PRESSURE: 78 MMHG

## 2019-09-30 DIAGNOSIS — K62.5 BRIGHT RED RECTAL BLEEDING: ICD-10-CM

## 2019-09-30 DIAGNOSIS — E03.2 HYPOTHYROIDISM DUE TO NON-MEDICATION EXOGENOUS SUBSTANCES: ICD-10-CM

## 2019-09-30 DIAGNOSIS — K59.00 CONSTIPATION, UNSPECIFIED CONSTIPATION TYPE: Primary | ICD-10-CM

## 2019-09-30 DIAGNOSIS — Z09 HOSPITAL DISCHARGE FOLLOW-UP: ICD-10-CM

## 2019-09-30 NOTE — PROGRESS NOTES
Ms. Ashlee Morrow is a 37y.o. year old female, she is seen today for hospital follow up. Patient was admitted on 9/23/19 and discharged on 9/25/19 for the following: severe constipation. HPI: Dusty Driver is an 37 y.o. female who is seen for evaluation of abdominal pain. She states that she woke this AM with generalized abdominal pain. This is not unusual for her, but when it persisted she sought medical attention. There is associated nausea, but no vomiting. She felt chills and diaphoresis, but no documented fevers. She reports a life long struggle with constipation, but is not on a bowel regimen. She tells me her PCP was setting her up to see GI, but this has not happened yet. Labs drawn in the ED were remarkable for a mild leukocytosis of 14.2K with no bands. CMP with lipase was relatively unremarkable. A CT scan was obtained which showed a large stool burden in the right and transverse colon, but mention is made of mild changes to the appendix which could not exclude early appendicitis. Because of this, surgery was consulted, and the patient was transferred from [facility] for examination and disposition. She received 4 mg of morphine earlier and states that her pain went from a 10/10 to a 7/10. She is pleasant and conversant and appears in NAD. Last BM was earlier today, and the left colon is decompressed on CT scan. CT ABD/PELVIS-IV ONLY   Final Result       1. The distal appendix is slightly enlarged, distended with focal fluid, with the most distal tip decompressed but with very slight associated stranding. No drainable collections are seen. Recommend clinical and laboratory correlation to exclude early/subacute appendicitis. 2. Spondylosis. In the setting of radiculopathy, routine lumbar spine MRI could best further assess.      Diagnostics:  Labs:   Results for orders placed or performed during the hospital encounter of 16/99/19   BASIC METABOLIC PANEL   Result Value Ref Range   Potassium 4.5 3.5 - 5.5 mmol/L   Sodium 138 133 - 145 mmol/L   Chloride 98 98 - 110 mmol/L   Glucose 149 (H) 70 - 99 mg/dL   Calcium 10.3 8.4 - 10.5 mg/dL   BUN 10 6 - 22 mg/dL   Creatinine 0.9 0.5 - 1.2 mg/dL   CO2 24 20 - 32 mmol/L   eGFR African American >60.0 >60.0   eGFR Non African American >60.0 >60.0   Anion Gap 15.6 mmol/L   HEPATIC FUNCTION PANEL   Result Value Ref Range   Albumin 4.6 3.5 - 5.0 g/dL   Total Protein 8.4 (H) 6.4 - 8.3 g/dL   Globulin 3.8 2.0 - 4.0 g/dL   A/G Ratio 1.2 1.1 - 2.6 ratio   Bilirubin Total 0.4 0.2 - 1.2 mg/dL   Bilirubin Direct <0.2 0.0 - 0.3 mg/dL   SGOT (AST) 21 10 - 37 U/L   Alkaline Phosphatase 74 25 - 115 U/L   SGPT (ALT) 13 5 - 40 U/L   LIPASE   Result Value Ref Range   LIPASE <20 7 - 60 U/L   CBC WITH DIFFERENTIAL AUTO   Result Value Ref Range   WBC x 10*3 14.2 (H) 4.0 - 11.0 K/uL   RBC x 10^6 5.37 (H) 3.80 - 5.20 M/uL   HGB 14.4 11.7 - 15.5 g/dL   HCT 43.5 35.1 - 46.5 %   MCV 81 80 - 95 fL   MCH 27 26 - 34 pg   MCHC 33 31 - 36 g/dL   RDW 14.9 10.0 - 15.5 %   Platelet 108 498 - 726 K/uL   MPV 10.4 9.0 - 13.0 fL   Segmented Neutrophils 77 (H) 40 - 75 %   Lymphocytes 18 (L) 20 - 45 %   Monocytes 4 3 - 12 %   Eosinophil 0 0 - 6 %   Basophils 0 0 - 2 %   Absolute Neutrophils 11.0 (H) 1.8 - 7.7 K/uL   Absolute Lymphocytes 2.5 1.0 - 4.8 K/uL   Absolute Monocyte Count 0.6 0.1 - 1.0 K/uL   Absolute Eosinophil 0.1 0.0 - 0.5 K/uL   Absolute Basophil Count 0.1 0.0 - 0.2 K/uL   Urinalysis (Lab Performed)   Result Value Ref Range   Urine pH 7.0 5.0 - 8.0 pH   Urine Protein Screen Negative Negative, Trace mg/dL   Urine Glucose Negative Negative mg/dL   Urine Ketones Negative Negative, NOT TESTED mg/dL   Urine Occult Blood Negative Negative   Urine Specific Gravity 1.015 1.005 - 1.030   Urine Nitrite Negative Negative   Urine Leukocyte Esterase Negative Negative   Urine Bilirubin Negative Negative   Urine Urobilinogen 0.2 <2.0 mg/dL   PREGNANCY URINE (Lab Performed)   Result Value Ref Range   PREGNANCY URINE Negative Negative     Patient states since discharge she feels ok. Comments she has been taking stool softners 2 times per day and having loose stools every other day. Further requesting updated referral to Dr. Brigitte Carver Also states she needs a GI that accepts her insurance. Allergies   Allergen Reactions    Tramadol Nausea and Vomiting     Current Outpatient Medications on File Prior to Visit   Medication Sig Dispense Refill    lisinopril-hydroCHLOROthiazide (PRINZIDE, ZESTORETIC) 20-12.5 mg per tablet TAKE 1 TABLET BY MOUTH DAILY 90 Tab 0    cyclobenzaprine (FLEXERIL) 10 mg tablet Take 1 Tab by mouth three (3) times daily as needed for Muscle Spasm(s). 90 Tab 3    levothyroxine (SYNTHROID) 25 mcg tablet TAKE 1 TABLET BY MOUTH DAILY BEFORE BREAKFAST 30 Tab 0    levothyroxine (SYNTHROID) 175 mcg tablet TAKE 1 TABLET BY MOUTH ONCE DAILY BEFORE BREAKFAST 30 Tab 0    acetaminophen (TYLENOL EXTRA STRENGTH) 500 mg tablet Take 1,000 mg by mouth every six (6) hours as needed for Pain. No current facility-administered medications on file prior to visit.       Patient Active Problem List   Diagnosis Code    Hypertension I5    Hypothyroid E03.9    Hyperglycemia due to type 2 diabetes mellitus ([facility] [address] E11.65    Nicotine dependence, cigarettes, uncomplicated Q68.829    Depression F32.9    DDD (degenerative disc disease), lumbar M51.36    Fatigue R53.83    Urgency of micturition R39.15    Encounter for long-term (current) use of medications Z79.899    Hypovitaminosis D E55.9    Prediabetes R73.03     Past Medical History:   Diagnosis Date    Back pain     Cervical paraspinal muscle spasm     Cervical radiculopathy     Chronic hip pain     Depression     Diabetes (HCC)     History of asthma     History of bronchitis     Hypertension     Hypothyroid     Prediabetes     Sciatica     Sickle cell trait ([facility] [address]      Past Surgical History:   Procedure Laterality Date    HX CARPAL TUNNEL RELEASE Right     HX HYSTERECTOMY      HX ORTHOPAEDIC      cyst removed right wrist    HX THYROIDECTOMY       BP Readings from Last 3 Encounters:   09/30/19 131/78   09/17/19 137/90   09/16/19 125/82     Wt Readings from Last 3 Encounters:   09/30/19 199 lb (90.3 kg)   09/17/19 199 lb (90.3 kg)   09/16/19 199 lb (90.3 kg)       Review of Systems   Review of Systems   Constitutional: Negative for chills and fever. Respiratory: Negative for shortness of breath. Cardiovascular: Negative for chest pain and palpitations. Gastrointestinal: Positive for abdominal pain (improved). Neurological: Negative for dizziness and headaches. /78   Pulse 96   Temp 98.4 °F (36.9 °C) (Oral)   Resp 16   Ht 6' 1\" (1.854 m)   Wt 199 lb (90.3 kg)   SpO2 98%   BMI 26.25 kg/m²     Objective:   Physical Exam   Constitutional: She is oriented to person, place, and time and well-developed, well-nourished, and in no distress. HENT:   Head: Normocephalic and atraumatic. Neck: Normal range of motion. Neck supple. Cardiovascular: Normal rate, regular rhythm and normal heart sounds. Exam reveals no gallop and no friction rub. No murmur heard. Pulmonary/Chest: Effort normal and breath sounds normal. She has no wheezes. She has no rhonchi. She has no rales. Abdominal: Soft. Bowel sounds are normal. There is tenderness (mild). Musculoskeletal: She exhibits no edema. Lymphadenopathy:     She has no cervical adenopathy. Neurological: She is alert and oriented to person, place, and time. Skin: Skin is warm and dry. Assessment/Plan:    ICD-10-CM ICD-9-CM    1. Constipation, unspecified constipation type K59.00 564.00    2. Hospital discharge follow-up Z09 V67.59    3. Bright red rectal bleeding K62.5 569.3 REFERRAL TO GASTROENTEROLOGY   4.  Hypothyroidism due to non-medication exogenous substances E03.2 244.3 REFERRAL TO ENDOCRINOLOGY     Orders Placed This Encounter    REFERRAL TO GASTROENTEROLOGY    REFERRAL TO ENDOCRINOLOGY       I have discussed the diagnosis with the patient and the intended plan as seen in the above orders. The patient has received an after-visit summary and questions were answered concerning future plans. I have discussed medication side effects and warnings with the patient as well. Follow-up and Dispositions    · Return in about 2 months (around 11/30/2019) for prediabetes/HTN/thyroid.

## 2019-09-30 NOTE — PROGRESS NOTES
Ryan Dee presents today for   Chief Complaint   Patient presents with   [facility] Follow Up       Is someone accompanying this pt? no    Is the patient using any DME equipment during OV? no    Depression Screening:  3 most recent PHQ Screens 9/10/2019   Little interest or pleasure in doing things Not at all   Feeling down, depressed, irritable, or hopeless Not at all   Total Score PHQ 2 0   Trouble falling or staying asleep, or sleeping too much Not at all   Feeling tired or having little energy Not at all   Poor appetite, weight loss, or overeating Not at all   Feeling bad about yourself - or that you are a failure or have let yourself or your family down Not at all   Trouble concentrating on things such as school, work, reading, or watching TV Not at all   Moving or speaking so slowly that other people could have noticed; or the opposite being so fidgety that others notice Not at all   Thoughts of being better off dead, or hurting yourself in some way Not at all   PHQ 9 Score 0   How difficult have these problems made it for you to do your work, take care of your home and get along with others Not difficult at all       Learning Assessment:  Learning Assessment 6/4/2018   PRIMARY LEARNER Patient   PRIMARY LANGUAGE ENGLISH   LEARNER PREFERENCE PRIMARY LISTENING   ANSWERED BY patient   RELATIONSHIP SELF       Abuse Screening:  Abuse Screening Questionnaire 6/7/2019   Do you ever feel afraid of your partner? N   Are you in a relationship with someone who physically or mentally threatens you? N   Is it safe for you to go home? Y       Fall Risk  Fall Risk Assessment, last 12 mths 12/24/2018   Able to walk? Yes   Fall in past 12 months? No       Health Maintenance reviewed and discussed and ordered per Provider.     Health Maintenance Due   Topic Date Due    Pneumococcal 0-64 years (1 of 1 - PPSV23) 09/03/1984    FOOT EXAM Q1  09/03/1988    EYE EXAM RETINAL OR DILATED  09/03/1988    DTaP/Tdap/Td series (1 - Tdap) 09/03/1999    PAP AKA CERVICAL CYTOLOGY  09/03/1999    MICROALBUMIN Q1  06/08/2019           Coordination of Care:  1. Have you been to the ER, urgent care clinic since your last visit? Hospitalized since your last visit? yes     2. Have you seen or consulted any other health care providers outside of the [address] [place] [address] since your last visit? Include any pap smears or colon screening.  no

## 2019-09-30 NOTE — PATIENT INSTRUCTIONS
Possible Appendicitis: Care Instructions  Your Care Instructions    Your doctor thinks you may have appendicitis. This means that your appendix may be infected. The appendix is a small sac that is shaped like a finger. It's attached to your large intestine. Sometimes it's hard to tell if a person has appendicitis. It is especially hard to tell in children, pregnant women, and older adults. If your doctor thinks it's possible that you have appendicitis, he or she may want to order more tests. Or your doctor may want to wait to see if your symptoms change. Your doctor thinks it's okay for you to go home right now. But you will need to watch for symptoms of appendicitis at home. If your symptoms continue or get worse, it's important to call your doctor or get medical care right away. Appendicitis can get serious very quickly. The main treatment is surgery to remove your appendix. Follow-up care is a key part of your treatment and safety. Be sure to make and go to all appointments, and call your doctor if you are having problems. It's also a good idea to know your test results and keep a list of the medicines you take. How can you care for yourself at home? · Do not eat or drink, unless your doctor says it is okay. If you need surgery, it's best to have an empty stomach. If you're thirsty, you can rinse your mouth with water. Or you can suck on hard candy. · Do not take laxatives. If you have appendicitis, they can make the appendix burst.  · Follow your doctor's instructions about taking medicines. Your doctor may tell you not to take antibiotics or pain pills. These medicines can make it harder to tell if you have appendicitis. · Watch for symptoms of appendicitis. See the When should you call for help section below. It is very important to follow your doctor's instructions about getting treatment if you have these symptoms. When should you call for help?   Call your doctor now or seek immediate medical care if:    · You have pain that becomes focused on one area of your belly.     · You have new or worse belly pain.     · You are vomiting.     · You have a fever.     · You cannot pass stools or gas.    Watch closely for changes in your health, and be sure to contact your doctor if:    · You do not get better as expected. Where can you learn more? Go to http://ranjan-luis.info/. Enter B364 in the search box to learn more about \"Possible Appendicitis: Care Instructions. \"  Current as of: November 7, 2018  Content Version: 12.2  © 7636-9111 [employer]. Care instructions adapted under license by [employer] (which disclaims liability or warranty for this information). If you have questions about a medical condition or this instruction, always ask your healthcare professional. [address] any warranty or liability for your use of this information.

## 2019-10-04 ENCOUNTER — OFFICE VISIT (OUTPATIENT)
Dept: CARDIOLOGY CLINIC | Age: 39
End: 2019-10-04

## 2019-10-04 VITALS
HEIGHT: 72 IN | HEART RATE: 90 BPM | DIASTOLIC BLOOD PRESSURE: 101 MMHG | WEIGHT: 201 LBS | SYSTOLIC BLOOD PRESSURE: 131 MMHG | BODY MASS INDEX: 27.22 KG/M2

## 2019-10-04 DIAGNOSIS — R07.9 CHEST PAIN, UNSPECIFIED TYPE: Primary | ICD-10-CM

## 2019-10-04 DIAGNOSIS — R55 SYNCOPE, UNSPECIFIED SYNCOPE TYPE: ICD-10-CM

## 2019-10-04 NOTE — PROGRESS NOTES
Ryan Dee presents today for   Chief Complaint   Patient presents with    New Patient     seen in [facility] preferred language for health care discussion is english/other. Is someone accompanying this pt? no    Is the patient using any DME equipment during [address]? no    Depression Screening:  3 most recent PHQ Screens 9/10/2019   Little interest or pleasure in doing things Not at all   Feeling down, depressed, irritable, or hopeless Not at all   Total Score PHQ 2 0   Trouble falling or staying asleep, or sleeping too much Not at all   Feeling tired or having little energy Not at all   Poor appetite, weight loss, or overeating Not at all   Feeling bad about yourself - or that you are a failure or have let yourself or your family down Not at all   Trouble concentrating on things such as school, work, reading, or watching TV Not at all   Moving or speaking so slowly that other people could have noticed; or the opposite being so fidgety that others notice Not at all   Thoughts of being better off dead, or hurting yourself in some way Not at all   PHQ 9 Score 0   How difficult have these problems made it for you to do your work, take care of your home and get along with others Not difficult at all       Learning Assessment:  Learning Assessment 6/4/2018   PRIMARY LEARNER Patient   PRIMARY LANGUAGE ENGLISH   LEARNER PREFERENCE PRIMARY LISTENING   ANSWERED BY patient   RELATIONSHIP SELF       Abuse Screening:  Abuse Screening Questionnaire 6/7/2019   Do you ever feel afraid of your partner? N   Are you in a relationship with someone who physically or mentally threatens you? N   Is it safe for you to go home? Y       Fall Risk  Fall Risk Assessment, last 12 mths 12/24/2018   Able to walk? Yes   Fall in past 12 months? No       Pt currently taking Anticoagulant therapy? no    Coordination of Care:  1. Have you been to the ER, urgent care clinic since your last visit? Hospitalized since your last visit? no    2. Have you seen or consulted any other health care providers outside of the [address] [place] [address] since your last visit? Include any pap smears or colon screening.  no

## 2019-10-04 NOTE — PROGRESS NOTES
Michael Le    CC: ER follow up, recurrent syncope    HPI    Michael Le is a 37 y.o. very pleasant AAF with no known heart disease, here for evaluation of CP and recurrent syncope. As you know patient has passed out several times over the course of her life. Time she passed out was in the ninth grade. All of the time she has some type of premonition usually loses her vision feels lightheaded see spots and then loses consciousness. She has been able to gain some type of control over this by usually sitting down or laying down or putting her head between her knees soon as she feels that sensation. Was recently she was at the [facility] with her  standing in line and so was not able to get into a different position in time before passing out. She went to the emergency department and I did personally obtained and reviewed these records. Be noted that she also complains of chest discomfort but it is not necessarily of pain she says is just a weird sensation in her chest that she has a hard time explaining. He hears ringing in her ear she can feel short of breath and just overall feels like the heat affects her negatively. They try to go to [facility] this summer and she said she literally could not walk there. Besides the above-mentioned she is also complaining of headaches and memory loss and I am aware she has an appointment with neurology coming up very soon. Reviewed her labs and I see that her thyroid is not in range.     No recent cardiac testing    CV RFs: DM2, HTN, +tobacco    Past Medical History:   Diagnosis Date    Back pain     Cervical paraspinal muscle spasm     Cervical radiculopathy     Chronic hip pain     Depression     Diabetes (HCC)     History of asthma     History of bronchitis     Hypertension     Hypothyroid     Prediabetes     Sciatica     Sickle cell trait ([facility] [address]        Past Surgical History:   Procedure Laterality Date    HX CARPAL TUNNEL RELEASE Right     HX HYSTERECTOMY      HX ORTHOPAEDIC      cyst removed right wrist    HX THYROIDECTOMY         Current Outpatient Medications   Medication Sig Dispense Refill    lisinopril-hydroCHLOROthiazide (PRINZIDE, ZESTORETIC) 20-12.5 mg per tablet TAKE 1 TABLET BY MOUTH DAILY 90 Tab 0    cyclobenzaprine (FLEXERIL) 10 mg tablet Take 1 Tab by mouth three (3) times daily as needed for Muscle Spasm(s). 90 Tab 3    levothyroxine (SYNTHROID) 25 mcg tablet TAKE 1 TABLET BY MOUTH DAILY BEFORE BREAKFAST 30 Tab 0    levothyroxine (SYNTHROID) 175 mcg tablet TAKE 1 TABLET BY MOUTH ONCE DAILY BEFORE BREAKFAST 30 Tab 0    acetaminophen (TYLENOL EXTRA STRENGTH) 500 mg tablet Take 1,000 mg by mouth every six (6) hours as needed for Pain.          Allergies   Allergen Reactions    Tramadol Nausea and Vomiting       Social History     Socioeconomic History    Marital status:      Spouse name: Not on file    Number of children: Not on file    Years of education: Not on file    Highest education level: Not on file   Occupational History    Not on file   Social Needs    Financial resource strain: Not on file    Food insecurity:     Worry: Not on file     Inability: Not on file    Transportation needs:     Medical: Not on file     Non-medical: Not on file   Tobacco Use    Smoking status: Current Every Day Smoker     Packs/day: 0.50    Smokeless tobacco: Never Used   Substance and Sexual Activity    Alcohol use: No    Drug use: No    Sexual activity: Not on file   Lifestyle    Physical activity:     Days per week: Not on file     Minutes per session: Not on file    Stress: Not on file   Relationships    Social connections:     Talks on phone: Not on file     Gets together: Not on file     Attends Jainism service: Not on file     Active member of club or organization: Not on file     Attends meetings of clubs or organizations: Not on file     Relationship status: Not on file    Intimate partner violence:     Fear of current or ex partner: Not on file     Emotionally abused: Not on file     Physically abused: Not on file     Forced sexual activity: Not on file   Other Topics Concern    Not on file   Social History Narrative    Not on file        FH: neg premature CAD and SCD    Review of Systems    14 pt Review of Systems is negative unless otherwise mentioned in the HPI. Wt Readings from Last 3 Encounters:   10/04/19 91.2 kg (201 lb)   09/30/19 90.3 kg (199 lb)   09/17/19 90.3 kg (199 lb)     Temp Readings from Last 3 Encounters:   09/30/19 98.4 °F (36.9 °C) (Oral)   09/17/19 98 °F (36.7 °C) (Oral)   09/16/19 98.3 °F (36.8 °C) (Oral)     BP Readings from Last 3 Encounters:   10/04/19 (!) 131/101   09/30/19 131/78   09/17/19 137/90     Pulse Readings from Last 3 Encounters:   10/04/19 90   09/30/19 96   09/17/19 94            Physical Exam:    Visit Vitals  BP (!) 131/101   Pulse 90   Ht 6' 1\" (1.854 m)   Wt 91.2 kg (201 lb)   BMI 26.52 kg/m²      Physical Exam   Constitutional: She is oriented to person, place, and time. She appears well-developed and well-nourished. HENT:   Head: Normocephalic and atraumatic. Eyes: Pupils are equal, round, and reactive to light. EOM are normal.   Neck: No JVD present. Cardiovascular: Normal rate, regular rhythm, normal heart sounds and intact distal pulses. Exam reveals no gallop and no friction rub. No murmur heard. Pulmonary/Chest: Effort normal and breath sounds normal. No respiratory distress. She has no wheezes. She has no rales. She exhibits no tenderness. Abdominal: Soft. Bowel sounds are normal.   Musculoskeletal: She exhibits no edema or tenderness. Neurological: She is alert and oriented to person, place, and time. Skin: Skin is warm and dry. Psychiatric: She has a normal mood and affect.        EKG today shows: NSR, normal axis and intervals, no ST segment abnormalities    Lab Results   Component Value Date/Time    Cholesterol, total 218 (H) 09/05/2019 09:35 AM HDL Cholesterol 39 (L) 09/05/2019 09:35 AM    LDL, calculated 146 (H) 09/05/2019 09:35 AM    VLDL, calculated 33 09/05/2019 09:35 AM    Triglyceride 166 (H) 09/05/2019 09:35 AM     Lab Results   Component Value Date/Time    TSH 0.055 (L) 09/05/2019 09:35 AM     Lab Results   Component Value Date/Time    Hemoglobin A1c 6.4 (H) 09/05/2019 09:35 AM       Impression and Plan:  Too Brannon is a 37 y.o. with:    Recurrent syncope  Atypical CP  Hypothyroidism, currently hyper  Dyslipidemia  DM2  HTN    Stress echocardiogram  If negative, would focus on thyroid as could be contributing to headaches, and memory loss  Symptoms sound most suggestive of neurocardiogenic syncope (etiology and techniques dw pt how to avoid LOC)- stay well hydrated, sit/lay down when feel premonition  RTC prn if SE negative    Thank you for allowing me to participate in the care of your patient, please do not hesitate to call with questions or concerns.     [address] [facility],    [facility], DO

## 2019-10-04 NOTE — PATIENT INSTRUCTIONS
Stress echo for chest pain, syncope  Follow up as needed  If you have not heard from the central scheduler to schedule your testing in 48 hours, please call [phone].

## 2019-10-08 ENCOUNTER — TELEPHONE (OUTPATIENT)
Dept: FAMILY MEDICINE CLINIC | Age: 39
End: 2019-10-08

## 2019-10-08 NOTE — TELEPHONE ENCOUNTER
Spoke with patient regarding disability paperwork. She is going to call back to give me the fax number.

## 2019-10-10 ENCOUNTER — OFFICE VISIT (OUTPATIENT)
Dept: NEUROLOGY | Age: 39
End: 2019-10-10

## 2019-10-10 VITALS
DIASTOLIC BLOOD PRESSURE: 104 MMHG | HEART RATE: 113 BPM | RESPIRATION RATE: 20 BRPM | TEMPERATURE: 98.5 F | HEIGHT: 72 IN | WEIGHT: 202.6 LBS | SYSTOLIC BLOOD PRESSURE: 144 MMHG | BODY MASS INDEX: 27.44 KG/M2 | OXYGEN SATURATION: 98 %

## 2019-10-10 DIAGNOSIS — R55 SYNCOPE, CONVULSIVE: Primary | ICD-10-CM

## 2019-10-10 DIAGNOSIS — G43.009 MIGRAINE WITHOUT AURA AND WITHOUT STATUS MIGRAINOSUS, NOT INTRACTABLE: ICD-10-CM

## 2019-10-10 RX ORDER — SUMATRIPTAN 100 MG/1
100 TABLET, FILM COATED ORAL
Qty: 9 TAB | Refills: 4 | Status: SHIPPED | OUTPATIENT
Start: 2019-10-10 | End: 2019-10-10

## 2019-10-10 NOTE — LETTER
10/10/19 Patient: Maria E Bowden YOB: 1980 Date of Visit: 10/10/2019 Philip Hermosillo NP 
[address] [facility] [address] VIA In Basket Dear Philip Hermosillo NP, Thank you for referring Ms. Maria E Bowden to [facility] for evaluation. My notes for this consultation are attached. If you have questions, please do not hesitate to call me. I look forward to following your patient along with you.  
 
 
Sincerely, 
 
Joana Mccullough MD

## 2019-10-10 NOTE — PROGRESS NOTES
Zoya Queen is a 37 y.o. female new patient in today to discuss syncope as referred by Ligia Reinoso MD.    Patient has additional concerns of changes in memory and HAs. Learning assessment previously completed 6/4/2018; primary language is [place].     Vitals:    10/10/19 1312 10/10/19 1319 10/10/19 1320 10/10/19 1321   BP:  132/88 (!) 138/98 (!) 144/104   BP 1 Location:  Left arm Left arm Left arm   BP Patient Position:  Supine Sitting Standing   Pulse: (!) 106 93 (!) 104 (!) 113   Resp: 20      Temp: 98.5 °F (36.9 °C)      TempSrc: Oral      SpO2: 98%      Weight: 91.9 kg (202 lb 9.6 oz)      Height: 6' 1\" (1.854 m)

## 2019-10-10 NOTE — PROGRESS NOTES
Matt Bentley is a 37 y.o., right handed female, with a history of hypertension, prediabetes, who comes in with episodes of dizzy spells, lightheadedness with position changes. She'll also develop ringing in the ears and then she'll have episodes of passing out. If she gets back into the chair, bends over and tucks her head between her legs she can avoid passing out. This has been ongoing since the 9th grade. The spells are becoming more frequent. She actually feel out at the [facility]. There has been some seizure like activity with the spells. She's had seizure like activity with her spells frequently. She feels tired after the spells. She's not had urinary incontinence or tongue biting. She additionally has headaches. She'll get headaches about 4-5 times per year. They tend to last up to a 3-4 days. They are usually right sided, but she's had them on the left. They are sharp throbbing sensations. They are not necessarily associated with loss of consciousness. She has photophobia with them as well as nausea. There's a family history with her brother and son with migraine. Social History; , lives with her family. No alcohol or illicit drugs. Smokes 1/2 PPD. She's disabled from her osteoarthritis and depression. Family History; mother  from drug OD when patient was born. Father alive with diabetes. Siblings sickle cell disease, migraine. Current Outpatient Medications   Medication Sig Dispense Refill    lisinopril-hydroCHLOROthiazide (PRINZIDE, ZESTORETIC) 20-12.5 mg per tablet TAKE 1 TABLET BY MOUTH DAILY 90 Tab 0    cyclobenzaprine (FLEXERIL) 10 mg tablet Take 1 Tab by mouth three (3) times daily as needed for Muscle Spasm(s).  90 Tab 3    levothyroxine (SYNTHROID) 25 mcg tablet TAKE 1 TABLET BY MOUTH DAILY BEFORE BREAKFAST 30 Tab 0    levothyroxine (SYNTHROID) 175 mcg tablet TAKE 1 TABLET BY MOUTH ONCE DAILY BEFORE BREAKFAST 30 Tab 0    acetaminophen (TYLENOL EXTRA STRENGTH) 500 mg tablet Take 1,000 mg by mouth every six (6) hours as needed for Pain.          Past Medical History:   Diagnosis Date    Back pain     Cervical paraspinal muscle spasm     Cervical radiculopathy     Chronic hip pain     Depression     Diabetes (HCC)     History of asthma     History of bronchitis     Hypertension     Hypothyroid     Prediabetes     Sciatica     Sickle cell trait (HCC)        Past Surgical History:   Procedure Laterality Date    HX CARPAL TUNNEL RELEASE Right     HX HYSTERECTOMY      HX ORTHOPAEDIC      cyst removed right wrist    HX THYROIDECTOMY         Allergies   Allergen Reactions    Tramadol Nausea and Vomiting       Patient Active Problem List   Diagnosis Code    Hypertension I5    Hypothyroid E03.9    Hyperglycemia due to type 2 diabetes mellitus (HCC) E11.65    Nicotine dependence, cigarettes, uncomplicated S96.201    Depression F32.9    DDD (degenerative disc disease), lumbar M51.36    Fatigue R53.83    Urgency of micturition R39.15    Encounter for long-term (current) use of medications Z79.899    Hypovitaminosis D E55.9    Prediabetes R73.03         Review of Systems:   As above otherwise 11 point review of systems negative including;   Constitutional no fever or chills  Skin denies rash or itching  HENT  Denies tinnitus, hearing lose  Eyes denies diplopia vision lose  Respiratory denies shortness of breath  Cardiovascular denies chest pain, dyspnea on exertion  Gastrointestinal denies nausea, vomiting, diarrhea, constipation  Genitourinary denies incontinence  Musculoskeletal denies joint pain or swelling  Endocrine denies weight change  Hematology denies easy bruising or bleeding   Neurological as above in HPI      PHYSICAL EXAMINATION:      VITAL SIGNS:    Visit Vitals  BP (!) 144/104 (BP 1 Location: Left arm, BP Patient Position: Standing)   Pulse (!) 113   Temp 98.5 °F (36.9 °C) (Oral)   Resp 20   Ht 6' 1\" (1.854 m)   Wt 91.9 kg (202 lb 9.6 oz)   SpO2 98%   BMI 26.73 kg/m²     There was no orthostatic changes in her vital signs. GENERAL: The patient is well developed, well nourished, and in no apparent distress. EXTREMITIES: No clubbing, cyanosis, or edema is identified. Pulses 2+ and symmetrical.  Muscle tone is normal.  HEAD:   Ear, nose, and throat appear to be without trauma. The patient is normocephalic. NEUROLOGIC EXAMINATION    MENTAL STATUS: The patient is awake, alert, and oriented x 4. Fund of knowledge is adequate. Speech is fluent and memory appears to be intact, both long and short term. CRANIAL NERVES: II - Visual fields are full to confrontation. Funduscopic examination reveals flat disks bilaterally. Pupils are both 4 mm and briskly reactive to light and accommodation. III, IV, VI - Extraocular movements are intact and there is no nystagmus. V - Facial sensation is intact to pinprick and light touch. VII - Face is symmetrical.   VIII - Hearing is present. IX, X, XII- Palate rises symmetrically. Gag is present. Tongue is in the midline. XI - Shoulder shrugging and head turning intact  MOTOR:  The patient is 5/5 in all four limbs without any drift. Fine finger movements are symmetrical.  Isolated motor group testing reveals no focal abnormalities. Tone is normal.  Sensory examination is intact to pinprick, light touch and position sense testing. Reflexes are 2+ and symmetrical. Plantars are down going. Cerebellar examination reveals no gross ataxia or dysmetria. Gait is normal and the patient can tandem walk without any difficulty.        CBC:   Lab Results   Component Value Date/Time    WBC 9.7 09/13/2019 12:43 PM    RBC 4.27 09/13/2019 12:43 PM    HGB 11.6 (L) 09/13/2019 12:43 PM    HCT 33.8 (L) 09/13/2019 12:43 PM    PLATELET 927 11/75/2542 12:43 PM     BMP:   Lab Results   Component Value Date/Time    Glucose 111 (H) 09/13/2019 12:43 PM    Sodium 137 09/13/2019 12:43 PM    Potassium 3.7 09/13/2019 12:43 PM    Chloride 105 09/13/2019 12:43 PM    CO2 27 09/13/2019 12:43 PM    BUN 9 09/13/2019 12:43 PM    Creatinine 0.94 09/13/2019 12:43 PM    Calcium 8.6 09/13/2019 12:43 PM     CMP:   Lab Results   Component Value Date/Time    Glucose 111 (H) 09/13/2019 12:43 PM    Sodium 137 09/13/2019 12:43 PM    Potassium 3.7 09/13/2019 12:43 PM    Chloride 105 09/13/2019 12:43 PM    CO2 27 09/13/2019 12:43 PM    BUN 9 09/13/2019 12:43 PM    Creatinine 0.94 09/13/2019 12:43 PM    Calcium 8.6 09/13/2019 12:43 PM    Anion gap 5 09/13/2019 12:43 PM    BUN/Creatinine ratio 10 (L) 09/13/2019 12:43 PM    Alk. phosphatase 62 09/05/2019 09:35 AM    Protein, total 6.7 09/05/2019 09:35 AM    Albumin 4.0 09/05/2019 09:35 AM    A-G Ratio 1.5 09/05/2019 09:35 AM     Coagulation: No results found for: PTP, INR, APTT, PTTT, INREXT       Impression: Syncope versus seizures with syncope in this patient who has risk factors including none. She does not have a seizure nor does she have any orthostatic vital sign changes. She seems to be having these idiopathic spells have been going on since the ninth grade some of which are just plain syncope others that have syncopal seizure phenomena with them. Are these epileptic in nature? Additionally she has migraine headaches at [place] about 5 times per year. The problems she has is not that she is having them infrequently is that they are disabling for up to a week when she gets them. They seem to be pretty straightforward migraines. Plan: For her seizure work-up, she will need to have an EEG and an MRI. I do not want to start her on any medications at this time but I suspect I will be initiating care with an antiepileptic. In terms of her headaches, will give us some Imitrex to help with them. The MRI scan was requested for any mass lesions with her headaches. She will see her back here in approximately 6 weeks. Thank you for allowing to evaluate this patient.     PLEASE NOTE:   This document has been produced using voice recognition software. Unrecognized errors in transcription may be present.

## 2019-10-21 ENCOUNTER — HOSPITAL ENCOUNTER (OUTPATIENT)
Dept: NON INVASIVE DIAGNOSTICS | Age: 39
Discharge: HOME | End: 2019-10-21
Attending: INTERNAL MEDICINE
Payer: MEDICARE

## 2019-10-21 DIAGNOSIS — R55 SYNCOPE, UNSPECIFIED SYNCOPE TYPE: ICD-10-CM

## 2019-10-21 DIAGNOSIS — R07.9 CHEST PAIN, UNSPECIFIED TYPE: ICD-10-CM

## 2019-10-21 LAB
STRESS ANGINA INDEX: 0
STRESS BASELINE DIAS BP: 70 MMHG
STRESS BASELINE HR: 99 BPM
STRESS BASELINE SYS BP: 110 MMHG
STRESS ESTIMATED WORKLOAD: 8.6 METS
STRESS EXERCISE DUR MIN: NORMAL
STRESS PEAK DIAS BP: 70 MMHG
STRESS PEAK SYS BP: 140 MMHG
STRESS PERCENT HR ACHIEVED: 94 %
STRESS POST PEAK HR: 169 BPM
STRESS RATE PRESSURE PRODUCT: NORMAL BPM*MMHG
STRESS SR DUKE TREADMILL SCORE: 0
STRESS ST DEPRESSION: 0 MM
STRESS ST ELEVATION: 0 MM
STRESS STAGE 1 BP: 116 MMHG
STRESS STAGE 1 DURATION: 3 MIN:SEC
STRESS STAGE 1 HR: 133 BPM
STRESS STAGE 2 BP: NORMAL MMHG
STRESS STAGE 2 DURATION: 3 MIN:SEC
STRESS STAGE 2 HR: 153 BPM
STRESS STAGE 3 DURATION: NORMAL MIN:SEC
STRESS STAGE 3 HR: 155 BPM
STRESS STAGE RECOVERY 1 BP: NORMAL MMHG
STRESS STAGE RECOVERY 1 DURATION: 2 MIN:SEC
STRESS STAGE RECOVERY 1 HR: 115 BPM
STRESS STAGE RECOVERY 2 BP: NORMAL MMHG
STRESS STAGE RECOVERY 2 DURATION: 4 MIN:SEC
STRESS STAGE RECOVERY 2 HR: 107 BPM
STRESS TARGET HR: 179 BPM

## 2019-10-21 PROCEDURE — 93351 STRESS TTE COMPLETE: CPT

## 2019-10-22 NOTE — PROGRESS NOTES
Per your last note'  Impression and Plan:  Gabino Amado is a 37 y.o. with:     Recurrent syncope  Atypical CP  Hypothyroidism, currently hyper  Dyslipidemia  DM2  HTN     Stress echocardiogram  If negative, would focus on thyroid as could be contributing to headaches, and memory loss  Symptoms sound most suggestive of neurocardiogenic syncope (etiology and techniques dw pt how to avoid LOC)- stay well hydrated, sit/lay down when feel premonition  RTC prn if SE negative     Thank you for allowing me to participate in the care of your patient, please do not hesitate to call with questions or concerns.     Regards,     Krista Abebe, DO

## 2019-10-23 ENCOUNTER — HOSPITAL ENCOUNTER (OUTPATIENT)
Age: 39
Discharge: HOME | End: 2019-10-23
Attending: PSYCHIATRY & NEUROLOGY
Payer: MEDICARE

## 2019-10-23 DIAGNOSIS — R55 SYNCOPE, CONVULSIVE: ICD-10-CM

## 2019-10-23 PROCEDURE — 70551 MRI BRAIN STEM W/O DYE: CPT

## 2019-11-05 DIAGNOSIS — I10 ESSENTIAL HYPERTENSION: ICD-10-CM

## 2019-11-05 DIAGNOSIS — E03.2 HYPOTHYROIDISM DUE TO NON-MEDICATION EXOGENOUS SUBSTANCES: ICD-10-CM

## 2019-11-05 RX ORDER — LEVOTHYROXINE SODIUM 175 UG/1
TABLET ORAL
Qty: 30 TAB | Refills: 0 | Status: SHIPPED | OUTPATIENT
Start: 2019-11-05 | End: 2020-11-19 | Stop reason: SDUPTHER

## 2019-11-05 RX ORDER — LEVOTHYROXINE SODIUM 25 UG/1
TABLET ORAL
Qty: 30 TAB | Refills: 0 | Status: SHIPPED | OUTPATIENT
Start: 2019-11-05 | End: 2020-03-05 | Stop reason: ALTCHOICE

## 2019-11-07 ENCOUNTER — TELEPHONE (OUTPATIENT)
Dept: CARDIOLOGY CLINIC | Age: 39
End: 2019-11-07

## 2019-11-07 RX ORDER — LISINOPRIL AND HYDROCHLOROTHIAZIDE 12.5; 2 MG/1; MG/1
TABLET ORAL
Qty: 90 TAB | Refills: 0 | Status: SHIPPED | OUTPATIENT
Start: 2019-11-07 | End: 2020-04-14 | Stop reason: SDUPTHER

## 2019-11-07 NOTE — TELEPHONE ENCOUNTER
----- Message from Kimber Blake DO sent at 10/23/2019  8:43 AM EDT -----  Please call pt and let her know her stress echo was normal/ considered low risk- negative  This is very reassuring, and her passing out spells are most likely the \"common faint\"  I would focus on good hydration, stress mgt and follow up on her thyroid  Happy to help in the future if needed    Thanks  ----- Message -----  From: Antolin Kiser LPN  Sent: 94/82/2392  11:28 AM EDT  To: Kimber Blake DO    Per your last note'  Impression and Plan:  Shelbie Goddard is a 37 y.o. with:     Recurrent syncope  Atypical CP  Hypothyroidism, currently hyper  Dyslipidemia  DM2  HTN     Stress echocardiogram  If negative, would focus on thyroid as could be contributing to headaches, and memory loss  Symptoms sound most suggestive of neurocardiogenic syncope (etiology and techniques dw pt how to avoid LOC)- stay well hydrated, sit/lay down when feel premonition  RTC prn if SE negative     Thank you for allowing me to participate in the care of your patient, please do not hesitate to call with questions or concerns.     Regards,     Kimber Blake DO

## 2019-11-07 NOTE — TELEPHONE ENCOUNTER
Called patient, verified by two identifiers, name and . Patient notified of stress echo results. All questions answered at time of phone call.

## 2019-12-05 ENCOUNTER — HOSPITAL ENCOUNTER (OUTPATIENT)
Dept: NEUROLOGY | Age: 39
Discharge: HOME | End: 2019-12-05
Attending: PSYCHIATRY & NEUROLOGY
Payer: MEDICARE

## 2019-12-05 DIAGNOSIS — R55 SYNCOPE, CONVULSIVE: ICD-10-CM

## 2019-12-05 PROCEDURE — 95819 EEG AWAKE AND ASLEEP: CPT

## 2019-12-06 NOTE — PROCEDURES
[facility]  EEG    Name:  Loly Cooley  MR#:   [id number]  :  1980  ACCOUNT #:  [de-identified]  DATE OF SERVICE:  2019    EEG NUMBER:      REFERRING PHYSICIAN:  John Paul Chappell MD    HISTORY:  A 40-year-old female with history of syncope versus seizure. MEDICATIONS:  Synthroid, Flexeril, lisinopril, hydrochlorothiazide. EEG REPORT:  This is a 16-channel routine EEG done using the International 10-20 electrode placement system. The predominant background consists of posterior predominant symmetric 8-9 Hz rhythmic activity, which attenuates with eye opening. The tracing evolves into a 5-6 Hz more of low amplitude, irregular and symmetric slowing transitioning into vertex waves with some K-complexes and sleep spindles consistent with stage II sleep predominating during the second half of the test.  There were no abnormal photoparoxysmal responses and no abnormalities produced by hyperventilation. There were no abnormal epileptiform findings. IMPRESSION:  This is a normal awake, drowsy, and asleep EEG.       Meaghan Otto MD      [id number]  D:  2019 13:16  T:  2019 19:45  JOB #:  [id number]

## 2019-12-30 NOTE — PROGRESS NOTES
Called, no answer, left voicemail for patient to return call. She needs a follow-up visit for EEG results.

## 2020-01-15 ENCOUNTER — OFFICE VISIT (OUTPATIENT)
Dept: CARDIOLOGY CLINIC | Age: 40
End: 2020-01-15

## 2020-01-15 VITALS
WEIGHT: 208 LBS | SYSTOLIC BLOOD PRESSURE: 134 MMHG | HEIGHT: 72 IN | HEART RATE: 102 BPM | OXYGEN SATURATION: 98 % | RESPIRATION RATE: 18 BRPM | DIASTOLIC BLOOD PRESSURE: 106 MMHG | BODY MASS INDEX: 28.17 KG/M2

## 2020-01-15 DIAGNOSIS — R55 SYNCOPE, UNSPECIFIED SYNCOPE TYPE: Primary | ICD-10-CM

## 2020-01-15 RX ORDER — SUMATRIPTAN 100 MG/1
TABLET, FILM COATED ORAL
Refills: 4 | COMMUNITY
Start: 2019-10-10 | End: 2020-03-05 | Stop reason: ALTCHOICE

## 2020-01-15 RX ORDER — DOCUSATE SODIUM 100 MG/1
100 CAPSULE, LIQUID FILLED ORAL
COMMUNITY
Start: 2019-09-25

## 2020-01-15 NOTE — PROGRESS NOTES
Gaby Bowen is a 37 y.o. female presents in office for    Chief Complaint   Patient presents with    Follow-up     Pt requesting to be tested for POTS    Syncope        Visit Vitals  /88 (BP 1 Location: Left arm, BP Patient Position: Sitting)   Pulse 96   Resp 18   Ht 6' 1\" (1.854 m)   Wt 94.3 kg (208 lb)   SpO2 98%   BMI 27.44 kg/m²         Health Maintenance Due   Topic Date Due    Pneumococcal 0-64 years (1 of 1 - PPSV23) 09/03/1984    FOOT EXAM Q1  09/03/1988    EYE EXAM RETINAL OR DILATED  09/03/1988    DTaP/Tdap/Td series (1 - Tdap) 09/03/1989    PAP AKA CERVICAL CYTOLOGY  09/03/1999    MICROALBUMIN Q1  06/08/2019             1. Have you been to the ER, urgent care clinic since your last visit? Hospitalized since your last visit? No    2. Have you seen or consulted any other health care providers outside of the [address] [place] [address] since your last visit? Include any pap smears or colon screening. No    3 most recent PHQ Screens 1/15/2020   PHQ Not Done Active Diagnosis of Depression or Bipolar Disorder   Little interest or pleasure in doing things Not at all   Feeling down, depressed, irritable, or hopeless Not at all   Total Score PHQ 2 0   Trouble falling or staying asleep, or sleeping too much -   Feeling tired or having little energy -   Poor appetite, weight loss, or overeating -   Feeling bad about yourself - or that you are a failure or have let yourself or your family down -   Trouble concentrating on things such as school, work, reading, or watching TV -   Moving or speaking so slowly that other people could have noticed; or the opposite being so fidgety that others notice -   Thoughts of being better off dead, or hurting yourself in some way -   PHQ 9 Score -   How difficult have these problems made it for you to do your work, take care of your home and get along with others -       Abuse Screening Questionnaire 1/15/2020   Do you ever feel afraid of your partner?  Serafin Dubon Are you in a relationship with someone who physically or mentally threatens you? N   Is it safe for you to go home? Y       Fall Risk Assessment, last 12 mths 1/15/2020   Able to walk? Yes   Fall in past 12 months? Yes   Fall with injury?  Yes   Number of falls in past 12 months 2   Fall Risk Score 3       Learning Assessment 6/4/2018   PRIMARY LEARNER Patient   PRIMARY LANGUAGE ENGLISH   LEARNER PREFERENCE PRIMARY LISTENING   ANSWERED BY patient   RELATIONSHIP SELF

## 2020-01-15 NOTE — PROGRESS NOTES
Marguerite Jurado    CC: ER follow up recurrent syncope, \"Do I have POTS\"    HPI    Marguerite Jurado is a 37 y.o. very pleasant AAF with no known heart disease, here for evaluation of CP and recurrent syncope. As you know patient has passed out several times over the course of her life. Time she passed out was in the ninth grade. All of the time she has some type of premonition usually loses her vision feels lightheaded see spots and then loses consciousness. She has been able to gain some type of control over this by usually sitting down or laying down or putting her head between her knees soon as she feels that sensation. Was recently she was at the [facility] with her  standing in line and so was not able to get into a different position in time before passing out. She went to the emergency department and I did personally obtained and reviewed these records. Be noted that she also complains of chest discomfort but it is not necessarily of pain she says is just a weird sensation in her chest that she has a hard time explaining. He hears ringing in her ear she can feel short of breath and just overall feels like the heat affects her negatively. They try to go to [facility] this summer and she said she literally could not walk there. Besides the above-mentioned she is also complaining of headaches and memory loss and I am aware she has an appointment with neurology coming up very soon. Reviewed her labs and I see that her thyroid is not in range.     Negative SE, Negative orthostatics  Undergoing Neuro Workup and Endocrine to adjust thyroid meds    CV RFs: DM2, HTN, +tobacco    Past Medical History:   Diagnosis Date    Back pain     Cervical paraspinal muscle spasm     Cervical radiculopathy     Chronic hip pain     Depression     Diabetes (HCC)     History of asthma     History of bronchitis     Hypertension     Hypothyroid     Prediabetes     Sciatica     Sickle cell trait [facility])        Past Surgical History:   Procedure Laterality Date    HX CARPAL TUNNEL RELEASE Right     HX HYSTERECTOMY      HX ORTHOPAEDIC      cyst removed right wrist    HX THYROIDECTOMY         Current Outpatient Medications   Medication Sig Dispense Refill    docusate sodium (COLACE) 100 mg capsule Take 100 mg by mouth.  SUMAtriptan (IMITREX) 100 mg tablet TK 1 T PO 1 TIME FOR UP TO 1 DOSE PRF MIGRAINE. MAY REPEAT DOSE IN 1 HOUR  4    lisinopril-hydroCHLOROthiazide (PRINZIDE, ZESTORETIC) 20-12.5 mg per tablet TAKE 1 TABLET BY MOUTH DAILY 90 Tab 0    levothyroxine (SYNTHROID) 175 mcg tablet TAKE 1 TABLET BY MOUTH ONCE DAILY BEFORE BREAKFAST 30 Tab 0    cyclobenzaprine (FLEXERIL) 10 mg tablet Take 1 Tab by mouth three (3) times daily as needed for Muscle Spasm(s). 90 Tab 3    levothyroxine (SYNTHROID) 25 mcg tablet TAKE 1 TABLET BY MOUTH DAILY BEFORE BREAKFAST 30 Tab 0    acetaminophen (TYLENOL EXTRA STRENGTH) 500 mg tablet Take 1,000 mg by mouth every six (6) hours as needed for Pain.          Allergies   Allergen Reactions    Tramadol Nausea and Vomiting       Social History     Socioeconomic History    Marital status:      Spouse name: Not on file    Number of children: Not on file    Years of education: Not on file    Highest education level: Not on file   Occupational History    Not on file   Social Needs    Financial resource strain: Not on file    Food insecurity:     Worry: Not on file     Inability: Not on file    Transportation needs:     Medical: Not on file     Non-medical: Not on file   Tobacco Use    Smoking status: Current Every Day Smoker     Packs/day: 0.50    Smokeless tobacco: Never Used   Substance and Sexual Activity    Alcohol use: No    Drug use: No    Sexual activity: Not on file   Lifestyle    Physical activity:     Days per week: Not on file     Minutes per session: Not on file    Stress: Not on file   Relationships    Social connections:     Talks on phone: Not on file     Gets together: Not on file     Attends Congregation service: Not on file     Active member of club or organization: Not on file     Attends meetings of clubs or organizations: Not on file     Relationship status: Not on file    Intimate partner violence:     Fear of current or ex partner: Not on file     Emotionally abused: Not on file     Physically abused: Not on file     Forced sexual activity: Not on file   Other Topics Concern    Not on file   Social History Narrative    Not on file        FH: neg premature CAD and SCD    Review of Systems    14 pt Review of Systems is negative unless otherwise mentioned in the HPI. Wt Readings from Last 3 Encounters:   01/15/20 94.3 kg (208 lb)   10/10/19 91.9 kg (202 lb 9.6 oz)   10/04/19 91.2 kg (201 lb)     Temp Readings from Last 3 Encounters:   10/10/19 98.5 °F (36.9 °C) (Oral)   09/30/19 98.4 °F (36.9 °C) (Oral)   09/17/19 98 °F (36.7 °C) (Oral)     BP Readings from Last 3 Encounters:   01/15/20 (!) 134/106   10/10/19 (!) 144/104   10/04/19 (!) 131/101     Pulse Readings from Last 3 Encounters:   01/15/20 (!) 102   10/10/19 (!) 113   10/04/19 90            Physical Exam:    Visit Vitals  BP (!) 134/106 (BP 1 Location: Left arm, BP Patient Position: Standing)   Pulse (!) 102   Resp 18   Ht 6' 1\" (1.854 m)   Wt 94.3 kg (208 lb)   SpO2 98%   BMI 27.44 kg/m²      Physical Exam   Constitutional: She is oriented to person, place, and time. She appears well-developed and well-nourished. HENT:   Head: Normocephalic and atraumatic. Eyes: Pupils are equal, round, and reactive to light. EOM are normal.   Neck: No JVD present. Cardiovascular: Normal rate, regular rhythm, normal heart sounds and intact distal pulses. Exam reveals no gallop and no friction rub. No murmur heard. Pulmonary/Chest: Effort normal and breath sounds normal. No respiratory distress. She has no wheezes. She has no rales. She exhibits no tenderness. Abdominal: Soft.  Bowel sounds are normal.   Musculoskeletal:         General: No tenderness or edema. Neurological: She is alert and oriented to person, place, and time. Skin: Skin is warm and dry. Psychiatric: She has a normal mood and affect. EKG today shows: NSR, normal axis and intervals, no ST segment abnormalities    Lab Results   Component Value Date/Time    Cholesterol, total 218 (H) 09/05/2019 09:35 AM    HDL Cholesterol 39 (L) 09/05/2019 09:35 AM    LDL, calculated 146 (H) 09/05/2019 09:35 AM    VLDL, calculated 33 09/05/2019 09:35 AM    Triglyceride 166 (H) 09/05/2019 09:35 AM     Lab Results   Component Value Date/Time    TSH 0.055 (L) 09/05/2019 09:35 AM     Lab Results   Component Value Date/Time    Hemoglobin A1c 6.4 (H) 09/05/2019 09:35 AM     Stress Echo 10/21/20:  Negative stress test.  Normal stress echocardiogram. Low risk study. Impression and Plan:  Carolina Amanda is a 37 y.o. with:    Recurrent syncope  Atypical CP and SOB  Hypothyroidism, currently hyper  Dyslipidemia  DM2  HTN  Negative orthostatics  Negative SE    Refer to Dr. Funmilayo Moore for suspected autonomic dysfunction  Many of her symptoms can be related to current hyperthyroid state but doesn't explain the syncope  Doubt POTS  No further CV workup indicated at this time, RTC 1 year routine recheck, call sooner if questions    Results of SE thoroughly dw pt today. Thank you for allowing me to participate in the care of your patient, please do not hesitate to call with questions or concerns.     [address],    Funding Gates, DO

## 2020-03-05 ENCOUNTER — OFFICE VISIT (OUTPATIENT)
Dept: FAMILY MEDICINE CLINIC | Age: 40
End: 2020-03-05

## 2020-03-05 VITALS
RESPIRATION RATE: 20 BRPM | HEIGHT: 72 IN | SYSTOLIC BLOOD PRESSURE: 140 MMHG | WEIGHT: 216.2 LBS | DIASTOLIC BLOOD PRESSURE: 89 MMHG | HEART RATE: 103 BPM | BODY MASS INDEX: 29.28 KG/M2 | OXYGEN SATURATION: 97 % | TEMPERATURE: 99 F

## 2020-03-05 DIAGNOSIS — G89.4 CHRONIC PAIN SYNDROME: ICD-10-CM

## 2020-03-05 DIAGNOSIS — E03.2 HYPOTHYROIDISM DUE TO NON-MEDICATION EXOGENOUS SUBSTANCES: ICD-10-CM

## 2020-03-05 DIAGNOSIS — M51.36 DDD (DEGENERATIVE DISC DISEASE), LUMBAR: ICD-10-CM

## 2020-03-05 DIAGNOSIS — R73.03 PREDIABETES: ICD-10-CM

## 2020-03-05 DIAGNOSIS — I10 ESSENTIAL HYPERTENSION: Primary | ICD-10-CM

## 2020-03-05 RX ORDER — DICLOFENAC SODIUM 75 MG/1
75 TABLET, DELAYED RELEASE ORAL
Qty: 60 TAB | Refills: 1 | Status: SHIPPED | OUTPATIENT
Start: 2020-03-05 | End: 2020-07-20

## 2020-03-05 RX ORDER — HYDROXYZINE 25 MG/1
25 TABLET, FILM COATED ORAL
Qty: 60 TAB | Refills: 0 | Status: SHIPPED | OUTPATIENT
Start: 2020-03-05 | End: 2020-07-20

## 2020-03-05 RX ORDER — NAPROXEN SODIUM 220 MG
220 TABLET ORAL 2 TIMES DAILY WITH MEALS
COMMUNITY
End: 2021-03-24 | Stop reason: ALTCHOICE

## 2020-03-05 RX ORDER — DIPHENHYDRAMINE HCL 25 MG
25 TABLET ORAL
COMMUNITY
End: 2020-08-17

## 2020-03-05 NOTE — PROGRESS NOTES
Subjective:   Aleksandra Galindo is a 37 y.o. female with hypertension presents for follow up. Patient Active Problem List   Diagnosis Code    Hypertension Olga Almazan    Hypothyroid E03.9    Hyperglycemia due to type 2 diabetes mellitus ([facility] [address] E11.65    Nicotine dependence, cigarettes, uncomplicated D55.080    Depression F32.9    DDD (degenerative disc disease), lumbar M51.36    Fatigue R53.83    Urgency of micturition R39.15    Encounter for long-term (current) use of medications Z79.899    Hypovitaminosis D E55.9    Prediabetes R73.03     Current Outpatient Medications   Medication Sig Dispense Refill    diphenhydrAMINE (ALLERGY) 25 mg tablet Take 25 mg by mouth every six (6) hours as needed for Sleep.  naproxen sodium (ALEVE) 220 mg tablet Take 220 mg by mouth two (2) times daily (with meals).  docusate sodium (COLACE) 100 mg capsule Take 100 mg by mouth.  lisinopril-hydroCHLOROthiazide (PRINZIDE, ZESTORETIC) 20-12.5 mg per tablet TAKE 1 TABLET BY MOUTH DAILY 90 Tab 0    levothyroxine (SYNTHROID) 175 mcg tablet TAKE 1 TABLET BY MOUTH ONCE DAILY BEFORE BREAKFAST 30 Tab 0    cyclobenzaprine (FLEXERIL) 10 mg tablet Take 1 Tab by mouth three (3) times daily as needed for Muscle Spasm(s). 90 Tab 3    acetaminophen (TYLENOL EXTRA STRENGTH) 500 mg tablet Take 1,000 mg by mouth every six (6) hours as needed for Pain.       SUMAtriptan (IMITREX) 100 mg tablet TK 1 T PO 1 TIME FOR UP TO 1 DOSE PRF MIGRAINE. MAY REPEAT DOSE IN 1 HOUR  4    levothyroxine (SYNTHROID) 25 mcg tablet TAKE 1 TABLET BY MOUTH DAILY BEFORE BREAKFAST 30 Tab 0      Allergies   Allergen Reactions    Tramadol Nausea and Vomiting     Past Surgical History:   Procedure Laterality Date    HX CARPAL TUNNEL RELEASE Right     HX HYSTERECTOMY      HX ORTHOPAEDIC      cyst removed right wrist    HX THYROIDECTOMY       Family History   Problem Relation Age of Onset    Asthma Mother     Tuberculosis Mother     Alcohol abuse Mother     Drug Abuse Mother     Diabetes Father     Asthma Father     Heart Disease Father     Sickle Cell Anemia Sister     Depression Brother     Lung Cancer Maternal Grandmother     Hypertension Maternal Grandmother     Colon Cancer Paternal Grandmother     Emphysema Paternal Grandmother     Depression Brother     Depression Brother     Depression Brother       Lab Results   Component Value Date/Time    Cholesterol, total 218 (H) 09/05/2019 09:35 AM    HDL Cholesterol 39 (L) 09/05/2019 09:35 AM    LDL, calculated 146 (H) 09/05/2019 09:35 AM    VLDL, calculated 33 09/05/2019 09:35 AM    Triglyceride 166 (H) 09/05/2019 09:35 AM     Lab Results   Component Value Date/Time    Sodium 137 09/13/2019 12:43 PM    Potassium 3.7 09/13/2019 12:43 PM    Chloride 105 09/13/2019 12:43 PM    CO2 27 09/13/2019 12:43 PM    Anion gap 5 09/13/2019 12:43 PM    Glucose 111 (H) 09/13/2019 12:43 PM    BUN 9 09/13/2019 12:43 PM    Creatinine 0.94 09/13/2019 12:43 PM    BUN/Creatinine ratio 10 (L) 09/13/2019 12:43 PM    GFR est AA >60 09/13/2019 12:43 PM    GFR est non-AA >60 09/13/2019 12:43 PM    Calcium 8.6 09/13/2019 12:43 PM    Bilirubin, total <0.2 09/05/2019 09:35 AM    AST (SGOT) 14 09/05/2019 09:35 AM    Alk.  phosphatase 62 09/05/2019 09:35 AM    Protein, total 6.7 09/05/2019 09:35 AM    Albumin 4.0 09/05/2019 09:35 AM    A-G Ratio 1.5 09/05/2019 09:35 AM    ALT (SGPT) 9 09/05/2019 09:35 AM     Lab Results   Component Value Date/Time    WBC 9.7 09/13/2019 12:43 PM    HGB 11.6 (L) 09/13/2019 12:43 PM    HCT 33.8 (L) 09/13/2019 12:43 PM    PLATELET 838 91/40/5378 12:43 PM    MCV 79.2 09/13/2019 12:43 PM     Lab Results   Component Value Date/Time    Hemoglobin A1c 6.4 (H) 09/05/2019 09:35 AM     Wt Readings from Last 3 Encounters:   03/05/20 216 lb 3.2 oz (98.1 kg)   01/15/20 208 lb (94.3 kg)   10/10/19 202 lb 9.6 oz (91.9 kg)     BP Readings from Last 3 Encounters:   03/05/20 140/89   01/15/20 (!) 134/106   10/10/19 (!) 144/104       Hypertension ROS: taking medications as instructed, no medication side effects noted, no TIA's, no chest pain on exertion, no dyspnea on exertion, no swelling of ankles. Review of Systems - Allergy and Immunology ROS: positive-hives    New concerns: patient reports she needs a referral to a new pain management practice since her previous pain management office closed. She also is requesting renewal of her DMV handicap placard. Patient was seen by endocrinology and prescribe branded synthroid. However, she reports she continues to have hives. Reports she is having to take benadryl multiple times per day without improvement. States she was given an alternate medication once in the ED and this medication seemed to work better. Objective:   Visit Vitals  /89   Pulse (!) 103   Temp 99 °F (37.2 °C) (Oral)   Resp 20   Ht 6' 1\" (1.854 m)   Wt 216 lb 3.2 oz (98.1 kg)   SpO2 97%   BMI 28.52 kg/m²      General appearance - alert, well appearing, and in no distress  Neck - supple, no significant adenopathy, carotids upstroke normal bilaterally, no bruits  Chest - clear to auscultation, no wheezes, rales or rhonchi, symmetric air entry  Heart - normal rate, regular rhythm, normal S1, S2, no murmurs, rubs, clicks or gallops  Extremities - peripheral pulses normal, no pedal edema, no clubbing or cyanosis  Abdomen - soft, nontender, nondistended, no masses or organomegaly  Skin - scattered flat hypopigmented plaques to bilateral legs                  Assessment/Plan:      ICD-10-CM ICD-9-CM    1. Essential hypertension I10 401.9 LIPID PANEL      METABOLIC PANEL, COMPREHENSIVE   2. Hypothyroidism due to non-medication exogenous substances E03.2 244.3    3. Prediabetes R73.03 790.29 HEMOGLOBIN A1C WITH EAG   4. DDD (degenerative disc disease), lumbar M51.36 722.52 REFERRAL TO PAIN MANAGEMENT   5.  Chronic pain syndrome G89.4 338.4 REFERRAL TO PAIN MANAGEMENT     Orders Placed This Encounter    LIPID PANEL    METABOLIC PANEL, COMPREHENSIVE    HEMOGLOBIN A1C WITH EAG    METABOLIC PANEL, COMPREHENSIVE    LIPID PANEL    HEMOGLOBIN A1C WITH EAG    REFERRAL TO PAIN MANAGEMENT    diphenhydrAMINE (ALLERGY) 25 mg tablet    naproxen sodium (ALEVE) 220 mg tablet    hydroxyzine HCL (ATARAX) 25 mg tablet    diclofenac EC (VOLTAREN) 75 mg EC tablet         I have discussed the diagnosis with the patient and the intended plan as seen in the above orders. The patient has received an after-visit summary and questions were answered concerning future plans. I have discussed medication side effects and warnings with the patient as well. Patient agreeable with above plan and verbalizes understanding. Follow-up and Dispositions    · Return in about 4 months (around 7/5/2020) for HTN/prediabetes.

## 2020-03-05 NOTE — PATIENT INSTRUCTIONS

## 2020-03-06 ENCOUNTER — OFFICE VISIT (OUTPATIENT)
Dept: NEUROLOGY | Age: 40
End: 2020-03-06

## 2020-03-06 VITALS
OXYGEN SATURATION: 99 % | HEART RATE: 86 BPM | SYSTOLIC BLOOD PRESSURE: 156 MMHG | BODY MASS INDEX: 28.37 KG/M2 | TEMPERATURE: 98.5 F | RESPIRATION RATE: 20 BRPM | WEIGHT: 215 LBS | DIASTOLIC BLOOD PRESSURE: 102 MMHG

## 2020-03-06 DIAGNOSIS — R55 SYNCOPE, CONVULSIVE: Primary | ICD-10-CM

## 2020-03-06 DIAGNOSIS — G43.009 MIGRAINE WITHOUT AURA AND WITHOUT STATUS MIGRAINOSUS, NOT INTRACTABLE: ICD-10-CM

## 2020-03-06 LAB
ALBUMIN SERPL-MCNC: 4.4 G/DL (ref 3.8–4.8)
ALBUMIN/GLOB SERPL: 1.6 {RATIO} (ref 1.2–2.2)
ALP SERPL-CCNC: 67 IU/L (ref 39–117)
ALT SERPL-CCNC: 11 IU/L (ref 0–32)
AST SERPL-CCNC: 18 IU/L (ref 0–40)
BILIRUB SERPL-MCNC: 0.3 MG/DL (ref 0–1.2)
BUN SERPL-MCNC: 9 MG/DL (ref 6–24)
BUN/CREAT SERPL: 9 (ref 9–23)
CALCIUM SERPL-MCNC: 9.3 MG/DL (ref 8.7–10.2)
CHLORIDE SERPL-SCNC: 106 MMOL/L (ref 96–106)
CHOLEST SERPL-MCNC: 299 MG/DL (ref 100–199)
CO2 SERPL-SCNC: 21 MMOL/L (ref 20–29)
COMMENT, 011824: ABNORMAL
CREAT SERPL-MCNC: 1.05 MG/DL (ref 0.57–1)
EST. AVERAGE GLUCOSE BLD GHB EST-MCNC: 140 MG/DL
GLOBULIN SER CALC-MCNC: 2.8 G/DL (ref 1.5–4.5)
GLUCOSE SERPL-MCNC: 73 MG/DL (ref 65–99)
HBA1C MFR BLD: 6.5 % (ref 4.8–5.6)
HDLC SERPL-MCNC: 39 MG/DL
LDLC SERPL CALC-MCNC: 217 MG/DL (ref 0–99)
POTASSIUM SERPL-SCNC: 4.4 MMOL/L (ref 3.5–5.2)
PROT SERPL-MCNC: 7.2 G/DL (ref 6–8.5)
SODIUM SERPL-SCNC: 141 MMOL/L (ref 134–144)
TRIGL SERPL-MCNC: 217 MG/DL (ref 0–149)
VLDLC SERPL CALC-MCNC: 43 MG/DL (ref 5–40)

## 2020-03-06 RX ORDER — TOPIRAMATE 25 MG/1
25 TABLET ORAL 2 TIMES DAILY
Qty: 60 TAB | Refills: 2 | Status: SHIPPED | OUTPATIENT
Start: 2020-03-06 | End: 2021-04-28

## 2020-03-06 NOTE — PROGRESS NOTES
Re:  Jennifer Pereira,Follow up visit     3/6/2020 9:35 AM    SSN: [id number]    Subjective:   Maxime Ngo returns for follow up. In the last several months she's had 2 significant headaches lasting 2 days. She had significant lightheadedness and laid down promptly. She didn't lose consciousness. Currently she feels normal.      Medications:    Current Outpatient Medications   Medication Sig Dispense Refill    diphenhydrAMINE (ALLERGY) 25 mg tablet Take 25 mg by mouth every six (6) hours as needed for Sleep.  naproxen sodium (ALEVE) 220 mg tablet Take 220 mg by mouth two (2) times daily (with meals).  hydroxyzine HCL (ATARAX) 25 mg tablet Take 1 Tab by mouth three (3) times daily as needed for Itching. 60 Tab 0    diclofenac EC (VOLTAREN) 75 mg EC tablet Take 1 Tab by mouth two (2) times daily as needed for Pain (take with food). 60 Tab 1    docusate sodium (COLACE) 100 mg capsule Take 100 mg by mouth.  lisinopril-hydroCHLOROthiazide (PRINZIDE, ZESTORETIC) 20-12.5 mg per tablet TAKE 1 TABLET BY MOUTH DAILY 90 Tab 0    levothyroxine (SYNTHROID) 175 mcg tablet TAKE 1 TABLET BY MOUTH ONCE DAILY BEFORE BREAKFAST 30 Tab 0    cyclobenzaprine (FLEXERIL) 10 mg tablet Take 1 Tab by mouth three (3) times daily as needed for Muscle Spasm(s). 90 Tab 3    acetaminophen (TYLENOL EXTRA STRENGTH) 500 mg tablet Take 1,000 mg by mouth every six (6) hours as needed for Pain.          Vital signs:    Visit Vitals  BP (!) 156/102 (BP 1 Location: Left arm, BP Patient Position: Sitting)   Pulse 86   Temp 98.5 °F (36.9 °C) (Oral)   Resp 20   Ht (P) 6' 1\" (1.854 m)   Wt 97.5 kg (215 lb)   SpO2 99%   BMI (P) 28.37 kg/m²       Review of Systems:   As above otherwise 11 point review of systems negative including;   Constitutional no fever or chills  Skin denies rash or itching  HEENT  Denies tinnitus, hearing lose  Eyes denies diplopia vision lose  Respiratory denies sortness of breath  Cardiovascular denies chest pain, dyspnea on exertion  Gastrointestinal denies nausea, vomiting, diarrhea, constipation  Genitourinary denies incontinence  Musculoskeletal denies joint pain or swelling  Endocrine denies weight change  Hematology denies easy bruising or bleeding   Neurological as above in HPI      Patient Active Problem List   Diagnosis Code    Hypertension I5    Hypothyroid E03.9    Hyperglycemia due to type 2 diabetes mellitus ([facility] [address] E11.65    Nicotine dependence, cigarettes, uncomplicated I98.470    Depression F32.9    DDD (degenerative disc disease), lumbar M51.36    Fatigue R53.83    Urgency of micturition R39.15    Encounter for long-term (current) use of medications Z79.899    Hypovitaminosis D E55.9    Prediabetes R73.03         Objective: The patient is awake, alert, and oriented x 4. Fund of knowledge is adequate. Speech is fluent and memory is intact. Cranial Nerves: II - Visual fields are full to confrontation. III, IV, VI - Extraocular movements are intact. There is no nystagmus. V - Facial sensation is intact to pinprick. VII - Face is symmetrical.  VIII - Hearing is present. IX, X, XII - Palate is symmetrical.   XI - Shoulder shrugging and head turning intact  Motor: The patient moves all four limbs fairly well and symmetrically. Tone is normal. Reflexes are 2+ and symmetrical. Plantars are down going. Gait is normal.    Final result (Exam End: 10/23/2019 18:33) Provider Status: Reviewed   Result Notes for MRI BRAIN WO CONT     Notes recorded by Ce Canas LPN on 36/33/5075 at 1:40 PM EST  Called, no answer, left voicemail for patient to return call.  ------    Notes recorded by Ce Canas LPN on 29/7/5134 at 97:69 PM EDT  Called, no answer, no voicemail.          Study Result     MRI BRAIN WITHOUT CONTRAST     REASON FOR EXAM: Seizure, new, >41yo, no trauma  Additional History: History of multiple syncopal episodes, headaches.   Comparison Studies: No prior brain imaging     Imaging Technique:     Sequences:  Sagittal,  Coronal and axial T1 weighted, Diffusion Weighted  (DWI), Axial T2 weighted, Axial T2 FLAIR, and SWI/GRE MRI images of the brain.     Contrast Material:  NONE     Limiting Factors/Major Artifacts: None.     FINDINGS:     Brain Parenchyma: Negative for acute infarct. Cerebral white matter is normal.   No chronic cortical infarcts or chronic lacunar infarcts. No visible masses or  midline shift. No dedicated imaging of the mesial temporal lobes, but grossly  normal temporal lobe appearance bilaterally. There is no focal migrational  anomaly seen within the cerebral hemispheres. Brainstem and cerebellum appear  normal.     CSF Spaces:  Normal in size and morphology for the patient's age. No  hydrocephalus.     Vascular System:  Grossly patent flow in basilar and internal cerebral arteries. No findings of dural sinus thrombosis.      Hemorrhage:  No acute hemorrhage. No chronic microhemorrhage.     Other Structures:     Calvarium: No suspicious marrow signal.     Sella: Pituitary is not enlarged. Visualized Upper Cervical Spine: Normal.  Orbits: Normal for non-dedicated exam.  Paranasal Sinuses: No significant paranasal sinus disease. Mastoid Air Cells:  Clear.     IMPRESSION  IMPRESSION:     No acute intracranial abnormality. No mass, hemorrhage, or acute infarct. No  focal findings to explain seizures or syncope.     The brain appears normal.        [address] [place] [address]   EEG     Name:  Garrett Maza  MR#:   [id number]  :  1980  ACCOUNT #:  [de-identified]  DATE OF SERVICE:  2019     EEG NUMBER:       REFERRING PHYSICIAN:  Liang Correa MD     HISTORY:  A 75-year-old female with history of syncope versus seizure.     MEDICATIONS:  Synthroid, Flexeril, lisinopril, hydrochlorothiazide.     EEG REPORT:  This is a 16-channel routine EEG done using the International 10-20 electrode placement system.   The predominant background consists of posterior predominant symmetric 8-9 Hz rhythmic activity, which attenuates with eye opening. The tracing evolves into a 5-6 Hz more of low amplitude, irregular and symmetric slowing transitioning into vertex waves with some K-complexes and sleep spindles consistent with stage II sleep predominating during the second half of the test.  There were no abnormal photoparoxysmal responses and no abnormalities produced by hyperventilation. There were no abnormal epileptiform findings.     IMPRESSION:  This is a normal awake, drowsy, and asleep EEG.       Paris Arnold MD       I have reviewed the above imagines myself. CBC:   Lab Results   Component Value Date/Time    WBC 9.7 09/13/2019 12:43 PM    RBC 4.27 09/13/2019 12:43 PM    HGB 11.6 (L) 09/13/2019 12:43 PM    HCT 33.8 (L) 09/13/2019 12:43 PM    PLATELET 942 86/02/1288 12:43 PM     BMP:   Lab Results   Component Value Date/Time    Glucose 73 03/05/2020 11:30 AM    Sodium 141 03/05/2020 11:30 AM    Potassium 4.4 03/05/2020 11:30 AM    Chloride 106 03/05/2020 11:30 AM    CO2 21 03/05/2020 11:30 AM    BUN 9 03/05/2020 11:30 AM    Creatinine 1.05 (H) 03/05/2020 11:30 AM    Calcium 9.3 03/05/2020 11:30 AM     CMP:   Lab Results   Component Value Date/Time    Glucose 73 03/05/2020 11:30 AM    Sodium 141 03/05/2020 11:30 AM    Potassium 4.4 03/05/2020 11:30 AM    Chloride 106 03/05/2020 11:30 AM    CO2 21 03/05/2020 11:30 AM    BUN 9 03/05/2020 11:30 AM    Creatinine 1.05 (H) 03/05/2020 11:30 AM    Calcium 9.3 03/05/2020 11:30 AM    Anion gap 5 09/13/2019 12:43 PM    BUN/Creatinine ratio 9 03/05/2020 11:30 AM    Alk.  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 Plan: Will start on low dose Topamax for the migraine. Also strongly urged her to quit smoking. RTC 6 weeks. Sincerely,        Petra Recinos.  Nelson Lilly M.D.

## 2020-03-06 NOTE — LETTER
3/6/2020 9:43 AM 
 
Patient:  Zahira Jacobson YOB: 1980 Date of Visit: 3/6/2020 Dear Marbella Oliveira, IVTALY 
[address] [place] [address] VIA In Basket 
 : Thank you for referring Ms. Luci Mann to me for evaluation/treatment. Below are the relevant portions of my assessment and plan of care. If you have questions, please do not hesitate to call me. I look forward to following Ms. Sukhi Maya along with you.  
 
 
 
Sincerely, 
 
 
Radha Zhang MD

## 2020-03-06 NOTE — PROGRESS NOTES
Jacque Spivey is a 37 y.o. female in today for follow-up on results of MRI and EEG. 1. Have you been to the ER, urgent care clinic since your last visit? Hospitalized since your last visit? No    2. Have you seen or consulted any other health care providers outside of the [address] [place] [address] since your last visit? Include any pap smears or colon screening.  No

## 2020-03-19 ENCOUNTER — TELEPHONE (OUTPATIENT)
Dept: FAMILY MEDICINE CLINIC | Age: 40
End: 2020-03-19

## 2020-03-20 NOTE — TELEPHONE ENCOUNTER
Patient is aware of lab results, patient stated she did fast prior to lab work.  Patient verbalized understanding

## 2020-03-20 NOTE — TELEPHONE ENCOUNTER
Please advise patient her A1C is stable. However her cholesterol has greatly increased. Please inquire if she was fasting when her labs were drawn. All other labs are normal.  Thanks!

## 2020-03-24 RX ORDER — ATORVASTATIN CALCIUM 40 MG/1
40 TABLET, FILM COATED ORAL
Qty: 90 TAB | Refills: 0 | Status: SHIPPED | OUTPATIENT
Start: 2020-03-24 | End: 2020-07-06 | Stop reason: SDUPTHER

## 2020-04-13 ENCOUNTER — PATIENT MESSAGE (OUTPATIENT)
Dept: FAMILY MEDICINE CLINIC | Age: 40
End: 2020-04-13

## 2020-04-13 DIAGNOSIS — M79.10 MYALGIA: ICD-10-CM

## 2020-04-13 RX ORDER — CYCLOBENZAPRINE HCL 10 MG
10 TABLET ORAL
Qty: 90 TAB | Refills: 3 | Status: SHIPPED | OUTPATIENT
Start: 2020-04-13 | End: 2020-10-07 | Stop reason: SDUPTHER

## 2020-04-14 DIAGNOSIS — I10 ESSENTIAL HYPERTENSION: ICD-10-CM

## 2020-04-14 RX ORDER — LISINOPRIL AND HYDROCHLOROTHIAZIDE 12.5; 2 MG/1; MG/1
1 TABLET ORAL DAILY
Qty: 90 TAB | Refills: 1 | Status: SHIPPED | OUTPATIENT
Start: 2020-04-14 | End: 2020-10-04

## 2020-04-14 NOTE — TELEPHONE ENCOUNTER
Last Visit: 3/5/20 with VITALY Hanley  Next Appointment: f/u in 4 months  Previous Refill Encounter(s): 11/7/19 #90    Requested Prescriptions     Pending Prescriptions Disp Refills    lisinopril-hydroCHLOROthiazide (PRINZIDE, ZESTORETIC) 20-12.5 mg per tablet 90 Tab 1     Sig: Take 1 Tab by mouth daily.

## 2020-04-17 ENCOUNTER — VIRTUAL VISIT (OUTPATIENT)
Dept: NEUROLOGY | Age: 40
End: 2020-04-17

## 2020-04-17 VITALS — HEIGHT: 72 IN | WEIGHT: 215 LBS | BODY MASS INDEX: 29.12 KG/M2

## 2020-04-17 DIAGNOSIS — R55 SYNCOPE, CONVULSIVE: Primary | ICD-10-CM

## 2020-04-17 DIAGNOSIS — G43.009 MIGRAINE WITHOUT AURA AND WITHOUT STATUS MIGRAINOSUS, NOT INTRACTABLE: ICD-10-CM

## 2020-04-17 NOTE — PROGRESS NOTES
Re:  Liang Pereira,Follow up visit     4/17/2020 2:21 PM    SSN: [id number]    Subjective:   Edmond Verma returns for follow up. In the last several months she's had 2 significant headaches lasting 2 days. She had significant lightheadedness and laid down promptly. She didn't lose consciousness. Currently she feels normal.      She's had no headaches since being on the Topamax, maybe a little tingling of the fingers but no other side effects. She had a spell where she became lightheaded while in the car, felt palpitations and shortness of breath. She also had some nausea. She took her blood pressure when she got home and it was 104/63. She felt light headed intermittently. Medications:    Current Outpatient Medications   Medication Sig Dispense Refill    lisinopril-hydroCHLOROthiazide (PRINZIDE, ZESTORETIC) 20-12.5 mg per tablet Take 1 Tab by mouth daily. 90 Tab 1    cyclobenzaprine (FLEXERIL) 10 mg tablet Take 1 Tab by mouth three (3) times daily as needed for Muscle Spasm(s). 90 Tab 3    atorvastatin (LIPITOR) 40 mg tablet Take 1 Tab by mouth nightly. 90 Tab 0    topiramate (TOPAMAX) 25 mg tablet Take 1 Tab by mouth two (2) times a day. 60 Tab 2    diphenhydrAMINE (ALLERGY) 25 mg tablet Take 25 mg by mouth every six (6) hours as needed for Sleep.  naproxen sodium (ALEVE) 220 mg tablet Take 220 mg by mouth two (2) times daily (with meals).  hydroxyzine HCL (ATARAX) 25 mg tablet Take 1 Tab by mouth three (3) times daily as needed for Itching. 60 Tab 0    diclofenac EC (VOLTAREN) 75 mg EC tablet Take 1 Tab by mouth two (2) times daily as needed for Pain (take with food). 60 Tab 1    docusate sodium (COLACE) 100 mg capsule Take 100 mg by mouth.       levothyroxine (SYNTHROID) 175 mcg tablet TAKE 1 TABLET BY MOUTH ONCE DAILY BEFORE BREAKFAST 30 Tab 0    acetaminophen (TYLENOL EXTRA STRENGTH) 500 mg tablet Take 1,000 mg by mouth every six (6) hours as needed for Pain. Vital signs:    Visit Vitals  Ht 6' 1\" (1.854 m)   Wt 97.5 kg (215 lb)   BMI 28.37 kg/m²       Review of Systems:   As above otherwise 11 point review of systems negative including;   Constitutional no fever or chills  Skin denies rash or itching  HEENT  Denies tinnitus, hearing lose  Eyes denies diplopia vision lose  Respiratory denies sortness of breath  Cardiovascular denies chest pain, dyspnea on exertion  Gastrointestinal denies nausea, vomiting, diarrhea, constipation  Genitourinary denies incontinence  Musculoskeletal denies joint pain or swelling  Endocrine denies weight change  Hematology denies easy bruising or bleeding   Neurological as above in HPI      Patient Active Problem List   Diagnosis Code    Hypertension I5    Hypothyroid E03.9    Hyperglycemia due to type 2 diabetes mellitus ([facility] [address] E11.65    Nicotine dependence, cigarettes, uncomplicated G68.013    Depression F32.9    DDD (degenerative disc disease), lumbar M51.36    Fatigue R53.83    Urgency of micturition R39.15    Encounter for long-term (current) use of medications Z79.899    Hypovitaminosis D E55.9    Prediabetes R73.03         Objective: The patient is awake, alert, and oriented x 4. Fund of knowledge is adequate. Speech is fluent and memory is intact. Cranial Nerves: II  Visual fields are full to confrontation. III, IV, VI  Extraocular movements are intact. There is no nystagmus. VII  Face is symmetrical.  VIII - Hearing is present. IX, X, XII  Palate is symmetrical.   XI - Shoulder shrugging and head turning intact  Motor: The patient moves all four limbs fairly well and symmetrically.  Gait is normal.      CBC:   Lab Results   Component Value Date/Time    WBC 9.7 09/13/2019 12:43 PM    RBC 4.27 09/13/2019 12:43 PM    HGB 11.6 (L) 09/13/2019 12:43 PM    HCT 33.8 (L) 09/13/2019 12:43 PM    PLATELET 138 50/76/6589 12:43 PM     BMP:   Lab Results   Component Value Date/Time    Glucose 73 03/05/2020 11:30 AM    Sodium 141 03/05/2020 11:30 AM    Potassium 4.4 03/05/2020 11:30 AM    Chloride 106 03/05/2020 11:30 AM    CO2 21 03/05/2020 11:30 AM    BUN 9 03/05/2020 11:30 AM    Creatinine 1.05 (H) 03/05/2020 11:30 AM    Calcium 9.3 03/05/2020 11:30 AM     CMP:   Lab Results   Component Value Date/Time    Glucose 73 03/05/2020 11:30 AM    Sodium 141 03/05/2020 11:30 AM    Potassium 4.4 03/05/2020 11:30 AM    Chloride 106 03/05/2020 11:30 AM    CO2 21 03/05/2020 11:30 AM    BUN 9 03/05/2020 11:30 AM    Creatinine 1.05 (H) 03/05/2020 11:30 AM    Calcium 9.3 03/05/2020 11:30 AM    Anion gap 5 09/13/2019 12:43 PM    BUN/Creatinine ratio 9 03/05/2020 11:30 AM    Alk. 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 Better on Topamax, but still with what sounds like orthostatic spells. Plan:  Recommend that she have a tilt table test when the COVID problem has passed. RTC 6 weeks. Sincerely,        Shonda Obrien. Dalila Golden M.D. Consent: Remedios Henriquez, who was seen by synchronous (real-time) audio-video technology, and/or her healthcare decision maker, is aware that this patient-initiated, Telehealth encounter on 4/17/2020 is a billable service, with coverage as determined by her insurance carrier. She is aware that she may receive a bill and has provided verbal consent to proceed: Yes    I spent at least 25 minutes with this established patient, and >50% of the time was spent counseling and/or coordinating care regarding lightheadedness, migraines and syncopal spells.   [id number]  Subjective:   Remedios Henriquez is a 37 y.o. female who was seen for Migraine and Dizziness (\"syncope\")    We discussed the expected course, resolution and complications of the diagnosis(es) in detail. Medication risks, benefits, costs, interactions, and alternatives were discussed as indicated. I advised her to contact the office if her condition worsens, changes or fails to improve as anticipated. She expressed understanding with the diagnosis(es) and plan. Erika Hernández is a 37 y.o. female being evaluated by a video visit encounter for concerns as above. A caregiver was present when appropriate. Due to this being a TeleHealth encounter (During [address] emergency), evaluation of the following organ systems was limited: Vitals/Constitutional/EENT/Resp/CV/GI//MS/Neuro/Skin/Heme-Lymph-Imm. Pursuant to the emergency declaration under the [facility], [facility] waiver authority and the [employer] and [employer] Act, this Virtual  Visit was conducted, with patient's (and/or legal guardian's) consent, to reduce the patient's risk of exposure to COVID-19 and provide necessary medical care. Services were provided through a video synchronous discussion virtually to substitute for in-person clinic visit. Patient and provider were located at their individual homes.         Cory Matson MD

## 2020-04-17 NOTE — PROGRESS NOTES
Linus Lopez is a 37 y.o. female on virtual visit today for follow-up on syncope and migraines. 1. Have you been to the ER, urgent care clinic since your last visit? Hospitalized since your last visit? No    2. Have you seen or consulted any other health care providers outside of the [address] [place] [address] since your last visit? Include any pap smears or colon screening.  No

## 2020-05-04 ENCOUNTER — E-VISIT (OUTPATIENT)
Dept: FAMILY MEDICINE CLINIC | Age: 40
End: 2020-05-04

## 2020-05-04 ENCOUNTER — VIRTUAL VISIT (OUTPATIENT)
Dept: FAMILY MEDICINE CLINIC | Age: 40
End: 2020-05-04

## 2020-05-04 DIAGNOSIS — L50.8 RECURRENT URTICARIA: Primary | ICD-10-CM

## 2020-05-04 DIAGNOSIS — G89.4 CHRONIC PAIN SYNDROME: ICD-10-CM

## 2020-05-04 RX ORDER — FAMOTIDINE 20 MG/1
20 TABLET, FILM COATED ORAL 2 TIMES DAILY
Qty: 12 TAB | Refills: 0 | Status: SHIPPED | OUTPATIENT
Start: 2020-05-04 | End: 2020-08-17

## 2020-05-04 RX ORDER — HYDROCODONE BITARTRATE AND ACETAMINOPHEN 5; 325 MG/1; MG/1
1 TABLET ORAL
Qty: 18 TAB | Refills: 0 | Status: SHIPPED | OUTPATIENT
Start: 2020-05-04 | End: 2020-05-07

## 2020-05-04 RX ORDER — PREDNISONE 10 MG/1
TABLET ORAL
Qty: 21 TAB | Refills: 0 | Status: SHIPPED | OUTPATIENT
Start: 2020-05-04 | End: 2020-07-20 | Stop reason: ALTCHOICE

## 2020-05-04 NOTE — PROGRESS NOTES
Sophie Cutler is a 37 y.o. female who was seen by synchronous (real-time) audio-video technology on 5/4/2020. Consent: Sophie Cutler, who was seen by synchronous (real-time) audio-video technology, and/or her healthcare decision maker, is aware that this patient-initiated, Telehealth encounter on 5/4/2020 is a billable service, with coverage as determined by her insurance carrier. She is aware that she may receive a bill and has provided verbal consent to proceed: Yes. Assessment & Plan:   Diagnoses and all orders for this visit:    1. Recurrent urticaria  -     REFERRAL TO ENT-OTOLARYNGOLOGY    2. Chronic pain syndrome  -     HYDROcodone-acetaminophen (NORCO) 5-325 mg per tablet; Take 1 Tab by mouth every four (4) hours as needed for Pain for up to 3 days. Max Daily Amount: 6 Tabs. Other orders  -     predniSONE (STERAPRED DS) 10 mg dose pack; See administration instruction per 10mg dose pack  -     famotidine (PEPCID) 20 mg tablet; Take 1 Tab by mouth two (2) times a day. Instructed to call endocrinologist regarding possible reaction to synthroid    I spent at least 16 minutes on this visit with this established patient. [phone]  Subjective:   Sophie Cutler is a 37 y.o. female who was seen for Rash    Patient states she has been having intermittent rash/hives on face for the last 2 months. Reports she noticed new areas appear when she takes her branded Synthroid, started 3 months ago by endocrinologist.  Reports hives appear a couple of hours after taking synthroid. Reports hives are better today given she has not take her synthroid in a couple of days, due to  refill. However further reports she is unsure if synthroid is related given she has been having recurrent hives on her body for the last year. Reports she has not been seen by ENT for recurrent urticaria.   Per patient she does not recall being contacted when referral was originally placed 5/2019. Will send new referral today. Prior to Admission medications    Medication Sig Start Date End Date Taking? Authorizing Provider   lisinopril-hydroCHLOROthiazide (PRINZIDE, ZESTORETIC) 20-12.5 mg per tablet Take 1 Tab by mouth daily. 4/14/20   Jamil PARKER NP   cyclobenzaprine (FLEXERIL) 10 mg tablet Take 1 Tab by mouth three (3) times daily as needed for Muscle Spasm(s). 4/13/20   Jamil PARKER NP   atorvastatin (LIPITOR) 40 mg tablet Take 1 Tab by mouth nightly. 3/24/20   Jamil PARKER NP   topiramate (TOPAMAX) 25 mg tablet Take 1 Tab by mouth two (2) times a day. 3/6/20   Lolly Winslow MD   diphenhydrAMINE (ALLERGY) 25 mg tablet Take 25 mg by mouth every six (6) hours as needed for Sleep. Provider, Historical   naproxen sodium (ALEVE) 220 mg tablet Take 220 mg by mouth two (2) times daily (with meals). Provider, Historical   hydroxyzine HCL (ATARAX) 25 mg tablet Take 1 Tab by mouth three (3) times daily as needed for Itching. 3/5/20   Jamil PARKER NP   diclofenac EC (VOLTAREN) 75 mg EC tablet Take 1 Tab by mouth two (2) times daily as needed for Pain (take with food). 3/5/20   Jamil PARKER NP   docusate sodium (COLACE) 100 mg capsule Take 100 mg by mouth. 9/25/19   Provider, Historical   levothyroxine (SYNTHROID) 175 mcg tablet TAKE 1 TABLET BY MOUTH ONCE DAILY BEFORE BREAKFAST 11/5/19   Jamil PARKER NP   acetaminophen (TYLENOL EXTRA STRENGTH) 500 mg tablet Take 1,000 mg by mouth every six (6) hours as needed for Pain.     Provider, Historical     Allergies   Allergen Reactions    Tramadol Nausea and Vomiting       Patient Active Problem List   Diagnosis Code    Hypertension I5    Hypothyroid E03.9    Hyperglycemia due to type 2 diabetes mellitus (HCC) E11.65    Nicotine dependence, cigarettes, uncomplicated V03.541    Depression F32.9    DDD (degenerative disc disease), lumbar M51.36    Fatigue R53.83    Urgency of micturition R39.15    Encounter for long-term (current) use of medications Z79.899    Hypovitaminosis D E55.9    Prediabetes R73.03     Patient Active Problem List    Diagnosis Date Noted    Prediabetes     Hypertension 06/04/2018    Hypothyroid 06/04/2018    Hyperglycemia due to type 2 diabetes mellitus ([facility] [address] 06/04/2018    Nicotine dependence, cigarettes, uncomplicated 67/42/3941    Depression 06/04/2018    DDD (degenerative disc disease), lumbar 06/04/2018    Fatigue 06/04/2018    Urgency of micturition 06/04/2018    Encounter for long-term (current) use of medications 06/04/2018    Hypovitaminosis D 06/04/2018     Current Outpatient Medications   Medication Sig Dispense Refill    lisinopril-hydroCHLOROthiazide (PRINZIDE, ZESTORETIC) 20-12.5 mg per tablet Take 1 Tab by mouth daily. 90 Tab 1    cyclobenzaprine (FLEXERIL) 10 mg tablet Take 1 Tab by mouth three (3) times daily as needed for Muscle Spasm(s). 90 Tab 3    atorvastatin (LIPITOR) 40 mg tablet Take 1 Tab by mouth nightly. 90 Tab 0    topiramate (TOPAMAX) 25 mg tablet Take 1 Tab by mouth two (2) times a day. 60 Tab 2    diphenhydrAMINE (ALLERGY) 25 mg tablet Take 25 mg by mouth every six (6) hours as needed for Sleep.  naproxen sodium (ALEVE) 220 mg tablet Take 220 mg by mouth two (2) times daily (with meals).  hydroxyzine HCL (ATARAX) 25 mg tablet Take 1 Tab by mouth three (3) times daily as needed for Itching. 60 Tab 0    diclofenac EC (VOLTAREN) 75 mg EC tablet Take 1 Tab by mouth two (2) times daily as needed for Pain (take with food). 60 Tab 1    docusate sodium (COLACE) 100 mg capsule Take 100 mg by mouth.  levothyroxine (SYNTHROID) 175 mcg tablet TAKE 1 TABLET BY MOUTH ONCE DAILY BEFORE BREAKFAST 30 Tab 0    acetaminophen (TYLENOL EXTRA STRENGTH) 500 mg tablet Take 1,000 mg by mouth every six (6) hours as needed for Pain.        Allergies   Allergen Reactions    Tramadol Nausea and Vomiting     Past Medical History:   Diagnosis Date    Back pain     Cervical paraspinal muscle spasm     Cervical radiculopathy     Chronic hip pain     Depression     Diabetes (HCC)     History of asthma     History of bronchitis     Hypertension     Hypothyroid     Prediabetes     Sciatica     Sickle cell trait ([address]      Past Surgical History:   Procedure Laterality Date    HX CARPAL TUNNEL RELEASE Right     HX HYSTERECTOMY      HX ORTHOPAEDIC      cyst removed right wrist    HX THYROIDECTOMY       Family History   Problem Relation Age of Onset    Asthma Mother     Tuberculosis Mother     Alcohol abuse Mother     Drug Abuse Mother     Diabetes Father     Asthma Father     Heart Disease Father     Sickle Cell Anemia Sister     Depression Brother     Lung Cancer Maternal Grandmother     Hypertension Maternal Grandmother     Colon Cancer Paternal Grandmother     Emphysema Paternal Grandmother     Depression Brother     Depression Brother     Depression Brother      Social History     Tobacco Use    Smoking status: Current Every Day Smoker     Packs/day: 0.50    Smokeless tobacco: Never Used   Substance Use Topics    Alcohol use: No       Review of Systems   Constitutional: Negative for chills and fever. Respiratory: Negative for shortness of breath. Cardiovascular: Negative for chest pain and palpitations. Skin: Positive for rash. Neurological: Negative for dizziness and headaches. Objective: There were no vitals taken for this visit. General: alert, cooperative, no distress   Mental  status: normal mood, behavior, speech, dress, motor activity, and thought processes, able to follow commands   HENT: NCAT   Neck: no visualized mass   Resp: no respiratory distress   Neuro: no gross deficits   Skin: Scattered lightened discoloration to face   Psychiatric: normal affect, consistent with stated mood, no evidence of hallucinations     Additional exam findings:        We discussed the expected course, resolution and complications of the diagnosis(es) in detail. Medication risks, benefits, costs, interactions, and alternatives were discussed as indicated. I advised her to contact the office if her condition worsens, changes or fails to improve as anticipated. She expressed understanding with the diagnosis(es) and plan. Rosette Brewer is a 37 y.o. female who was evaluated by a video visit encounter for concerns as above. Patient identification was verified prior to start of the visit. A caregiver was present when appropriate. Due to this being a TeleHealth encounter (During [facility]- public health emergency), evaluation of the following organ systems was limited: Vitals/Constitutional/EENT/Resp/CV/GI//MS/Neuro/Skin/Heme-Lymph-Imm. Pursuant to the emergency declaration under the [facility] waiver authority and the [employer] and [employer] General Act, this Virtual  Visit was conducted, with patient's (and/or legal guardian's) consent, to reduce the patient's risk of exposure to COVID-19 and provide necessary medical care. Services were provided through a video synchronous discussion virtually to substitute for in-person clinic visit. Patient and provider were located at their individual homes.       Saint Furlough, NP

## 2020-07-06 RX ORDER — ATORVASTATIN CALCIUM 40 MG/1
40 TABLET, FILM COATED ORAL
Qty: 90 TAB | Refills: 0 | Status: SHIPPED | OUTPATIENT
Start: 2020-07-06 | End: 2021-12-03 | Stop reason: SDUPTHER

## 2020-07-20 ENCOUNTER — VIRTUAL VISIT (OUTPATIENT)
Dept: FAMILY MEDICINE CLINIC | Age: 40
End: 2020-07-20

## 2020-07-20 DIAGNOSIS — M25.522 LEFT ELBOW PAIN: ICD-10-CM

## 2020-07-20 DIAGNOSIS — M25.551 BILATERAL HIP PAIN: ICD-10-CM

## 2020-07-20 DIAGNOSIS — M25.552 BILATERAL HIP PAIN: ICD-10-CM

## 2020-07-20 DIAGNOSIS — G89.4 CHRONIC PAIN SYNDROME: Primary | ICD-10-CM

## 2020-07-20 RX ORDER — HYDROCODONE BITARTRATE AND ACETAMINOPHEN 5; 325 MG/1; MG/1
1 TABLET ORAL
Qty: 42 TAB | Refills: 0 | Status: SHIPPED | OUTPATIENT
Start: 2020-07-20 | End: 2020-07-27

## 2020-07-20 RX ORDER — CETIRIZINE HCL 10 MG
10 TABLET ORAL DAILY
COMMUNITY
Start: 2020-07-15 | End: 2021-03-24

## 2020-07-20 RX ORDER — IBUPROFEN 200 MG
TABLET ORAL
COMMUNITY
End: 2020-08-17

## 2020-07-20 NOTE — PROGRESS NOTES
Lul Flynn is a 37 y.o. female who was seen by synchronous (real-time) audio-video technology on 7/20/2020 for Pain (Chronic)      Assessment & Plan:   Diagnoses and all orders for this visit:    1. Chronic pain syndrome  -     HYDROcodone-acetaminophen (NORCO) 5-325 mg per tablet; Take 1 Tab by mouth every four (4) hours as needed for Pain for up to 7 days. Max Daily Amount: 6 Tabs. 2. Bilateral hip pain    3. Left elbow pain      I spent at least 20 minutes on this visit with this established patient. 712  Subjective:     Patient states she continues to have pain in back/hips and now is having left elbow pain. States elbow pain has been present for several months. Comments she was seen at [facility] due to feeling lump and informed she had tennis elbow. States she has been taking otc medication for pain with minimal improvement. Comments pain is waking her up out of her sleep. Comments at times she has weakness making it difficult for her to hold her 10lb dog. Certain movements causes pain to radiate downward and also up her arm. Patient states she has is still scheduled to see pain management on 8/26/2020 Dr. Zarina Kirby. Has not been able to get a sooner appt  Patient states she had another episode of dizziness, blurred vision, increased heart rate, sob and abd pain approx. 1 week ago or slightly longer. Patient states she has not been driving due to episodes. She has not followed up with neurology. Episodes last approx. 1 min. Comments she was seen by ENT had her hearing checked, allergy testing. Comments she is scheduled to have dizziness testing on 8/27/2020. Patient was also referred to dermatology for recurrent hives. Prior to Admission medications    Medication Sig Start Date End Date Taking? Authorizing Provider   atorvastatin (LIPITOR) 40 mg tablet Take 1 Tab by mouth nightly.  7/6/20   Delaney PARKER NP   predniSONE (STERAPRED DS) 10 mg dose pack See administration instruction per 10mg dose pack 5/4/20   [facility] TVITALY   famotidine (PEPCID) 20 mg tablet Take 1 Tab by mouth two (2) times a day. 5/4/20   [facility] T, NP   lisinopril-hydroCHLOROthiazide (PRINZIDE, ZESTORETIC) 20-12.5 mg per tablet Take 1 Tab by mouth daily. 4/14/20   [facility] TVITALY   cyclobenzaprine (FLEXERIL) 10 mg tablet Take 1 Tab by mouth three (3) times daily as needed for Muscle Spasm(s). 4/13/20   [facility] VITALY PARKER   topiramate (TOPAMAX) 25 mg tablet Take 1 Tab by mouth two (2) times a day. 3/6/20   Jaime Herndon MD   diphenhydrAMINE (ALLERGY) 25 mg tablet Take 25 mg by mouth every six (6) hours as needed for Sleep. Provider, Historical   naproxen sodium (ALEVE) 220 mg tablet Take 220 mg by mouth two (2) times daily (with meals). Provider, Historical   hydroxyzine HCL (ATARAX) 25 mg tablet Take 1 Tab by mouth three (3) times daily as needed for Itching. 3/5/20   [facility] VITALY PARKER   diclofenac EC (VOLTAREN) 75 mg EC tablet Take 1 Tab by mouth two (2) times daily as needed for Pain (take with food). 3/5/20   [facility] T, NP   docusate sodium (COLACE) 100 mg capsule Take 100 mg by mouth. 9/25/19   Provider, Historical   levothyroxine (SYNTHROID) 175 mcg tablet TAKE 1 TABLET BY MOUTH ONCE DAILY BEFORE BREAKFAST 11/5/19   [facility] TVITALY   acetaminophen (TYLENOL EXTRA STRENGTH) 500 mg tablet Take 1,000 mg by mouth every six (6) hours as needed for Pain.     Provider, Historical     Patient Active Problem List   Diagnosis Code    Hypertension I5    Hypothyroid E03.9    Hyperglycemia due to type 2 diabetes mellitus ([facility] [address] E11.65    Nicotine dependence, cigarettes, uncomplicated M12.646    Depression F32.9    DDD (degenerative disc disease), lumbar M51.36    Fatigue R53.83    Urgency of micturition R39.15    Encounter for long-term (current) use of medications Z79.899    Hypovitaminosis D E55.9    Prediabetes R73.03     Patient Active Problem List    Diagnosis Date Noted    Prediabetes     Hypertension 06/04/2018    Hypothyroid 06/04/2018    Hyperglycemia due to type 2 diabetes mellitus ([facility] [address] 06/04/2018    Nicotine dependence, cigarettes, uncomplicated 44/53/3805    Depression 06/04/2018    DDD (degenerative disc disease), lumbar 06/04/2018    Fatigue 06/04/2018    Urgency of micturition 06/04/2018    Encounter for long-term (current) use of medications 06/04/2018    Hypovitaminosis D 06/04/2018     Current Outpatient Medications   Medication Sig Dispense Refill    ibuprofen (MOTRIN) 200 mg tablet Take  by mouth.  HYDROcodone-acetaminophen (NORCO) 5-325 mg per tablet Take 1 Tab by mouth every four (4) hours as needed for Pain for up to 7 days. Max Daily Amount: 6 Tabs. 42 Tab 0    atorvastatin (LIPITOR) 40 mg tablet Take 1 Tab by mouth nightly. 90 Tab 0    famotidine (PEPCID) 20 mg tablet Take 1 Tab by mouth two (2) times a day. 12 Tab 0    cyclobenzaprine (FLEXERIL) 10 mg tablet Take 1 Tab by mouth three (3) times daily as needed for Muscle Spasm(s). 90 Tab 3    diphenhydrAMINE (ALLERGY) 25 mg tablet Take 25 mg by mouth every six (6) hours as needed for Sleep.  naproxen sodium (ALEVE) 220 mg tablet Take 220 mg by mouth two (2) times daily (with meals).  docusate sodium (COLACE) 100 mg capsule Take 100 mg by mouth.  levothyroxine (SYNTHROID) 175 mcg tablet TAKE 1 TABLET BY MOUTH ONCE DAILY BEFORE BREAKFAST 30 Tab 0    acetaminophen (TYLENOL EXTRA STRENGTH) 500 mg tablet Take 1,000 mg by mouth every six (6) hours as needed for Pain.  cetirizine (ZYRTEC) 10 mg tablet Take 10 mg by mouth daily.  lisinopril-hydroCHLOROthiazide (PRINZIDE, ZESTORETIC) 20-12.5 mg per tablet Take 1 Tab by mouth daily. 90 Tab 1    topiramate (TOPAMAX) 25 mg tablet Take 1 Tab by mouth two (2) times a day.  60 Tab 2     Allergies   Allergen Reactions    Tramadol Nausea and Vomiting     Past Medical History:   Diagnosis Date    Back pain     Cervical paraspinal muscle spasm     Cervical radiculopathy     Chronic hip pain     Depression     Diabetes (HCC)     History of asthma     History of bronchitis     Hypertension     Hypothyroid     Prediabetes     Sciatica     Sickle cell trait ([address]      Past Surgical History:   Procedure Laterality Date    HX CARPAL TUNNEL RELEASE Right     HX HYSTERECTOMY      HX ORTHOPAEDIC      cyst removed right wrist    HX THYROIDECTOMY       Family History   Problem Relation Age of Onset    Asthma Mother     Tuberculosis Mother     Alcohol abuse Mother     Drug Abuse Mother     Diabetes Father     Asthma Father     Heart Disease Father     Sickle Cell Anemia Sister     Depression Brother     Lung Cancer Maternal Grandmother     Hypertension Maternal Grandmother     Colon Cancer Paternal Grandmother     Emphysema Paternal Grandmother     Depression Brother     Depression Brother     Depression Brother      Social History     Tobacco Use    Smoking status: Current Every Day Smoker     Packs/day: 0.50    Smokeless tobacco: Never Used   Substance Use Topics    Alcohol use: No     Review of Systems   Constitutional: Negative for chills and fever. Musculoskeletal: Positive for back pain (lower back) and joint pain (bilateral hips and left elbow). Neurological: Positive for dizziness (intermittent).      Objective:     Patient-Reported Vitals 7/20/2020   Patient-Reported Weight 206   Patient-Reported Height 6'1\"   Patient-Reported Pulse 100   Patient-Reported Temperature 98.7   Patient-Reported Systolic  126   Patient-Reported Diastolic 66      General: alert, cooperative, no distress   Mental  status: normal mood, behavior, speech, dress, motor activity, and thought processes, able to follow commands   HENT: NCAT   Neck: no visualized mass   Resp: no respiratory distress   Neuro: no gross deficits   Skin: no discoloration or lesions of concern on visible areas   Psychiatric: normal affect, consistent with stated mood, no evidence of hallucinations     Additional exam findings: We discussed the expected course, resolution and complications of the diagnosis(es) in detail. Medication risks, benefits, costs, interactions, and alternatives were discussed as indicated. I advised her to contact the office if her condition worsens, changes or fails to improve as anticipated. She expressed understanding with the diagnosis(es) and plan. Alicia Garcia, who was evaluated through a patient-initiated, synchronous (real-time) audio-video encounter, and/or her healthcare decision maker, is aware that it is a billable service, with coverage as determined by her insurance carrier. She provided verbal consent to proceed: Yes, and patient identification was verified. It was conducted pursuant to the emergency declaration under the [address] [place] [address], [address] [place] [address] authority and the [employer] and [employer] General Act. A caregiver was present when appropriate. Ability to conduct physical exam was limited. I was in the office. The patient was at home.       Tameka Jorgensen NP

## 2020-07-27 ENCOUNTER — PATIENT MESSAGE (OUTPATIENT)
Dept: FAMILY MEDICINE CLINIC | Age: 40
End: 2020-07-27

## 2020-08-17 ENCOUNTER — HOSPITAL ENCOUNTER (EMERGENCY)
Age: 40
Discharge: HOME | End: 2020-08-17
Attending: EMERGENCY MEDICINE
Payer: MEDICARE

## 2020-08-17 VITALS
HEART RATE: 101 BPM | OXYGEN SATURATION: 100 % | SYSTOLIC BLOOD PRESSURE: 122 MMHG | BODY MASS INDEX: 27.9 KG/M2 | WEIGHT: 206 LBS | DIASTOLIC BLOOD PRESSURE: 90 MMHG | RESPIRATION RATE: 18 BRPM | HEIGHT: 72 IN | TEMPERATURE: 98.2 F

## 2020-08-17 DIAGNOSIS — M25.552 BILATERAL HIP PAIN: ICD-10-CM

## 2020-08-17 DIAGNOSIS — M25.551 BILATERAL HIP PAIN: ICD-10-CM

## 2020-08-17 DIAGNOSIS — M54.42 BILATERAL LOW BACK PAIN WITH BILATERAL SCIATICA, UNSPECIFIED CHRONICITY: Primary | ICD-10-CM

## 2020-08-17 DIAGNOSIS — M54.41 BILATERAL LOW BACK PAIN WITH BILATERAL SCIATICA, UNSPECIFIED CHRONICITY: Primary | ICD-10-CM

## 2020-08-17 PROCEDURE — 99282 EMERGENCY DEPT VISIT SF MDM: CPT

## 2020-08-17 RX ORDER — NAPROXEN 500 MG/1
500 TABLET ORAL 2 TIMES DAILY WITH MEALS
Qty: 20 TAB | Refills: 0 | Status: SHIPPED | OUTPATIENT
Start: 2020-08-17 | End: 2020-08-27

## 2020-08-17 RX ORDER — ACETAMINOPHEN 500 MG
1000 TABLET ORAL
Qty: 30 TAB | Refills: 0 | Status: SHIPPED | OUTPATIENT
Start: 2020-08-17 | End: 2020-11-19 | Stop reason: ALTCHOICE

## 2020-08-17 NOTE — DISCHARGE INSTRUCTIONS
Patient Education        Back Pain: Care Instructions  Your Care Instructions     Back pain has many possible causes. It is often related to problems with muscles and ligaments of the back. It may also be related to problems with the nerves, discs, or bones of the back. Moving, lifting, standing, sitting, or sleeping in an awkward way can strain the back. Sometimes you don't notice the injury until later. Arthritis is another common cause of back pain. Although it may hurt a lot, back pain usually improves on its own within several weeks. Most people recover in 12 weeks or less. Using good home treatment and being careful not to stress your back can help you feel better sooner. Follow-up care is a key part of your treatment and safety. Be sure to make and go to all appointments, and call your doctor if you are having problems. It's also a good idea to know your test results and keep a list of the medicines you take. How can you care for yourself at home? · Sit or lie in positions that are most comfortable and reduce your pain. Try one of these positions when you lie down:  ? Lie on your back with your knees bent and supported by large pillows. ? Lie on the floor with your legs on the seat of a sofa or chair. ? Lie on your side with your knees and hips bent and a pillow between your legs. ? Lie on your stomach if it does not make pain worse. · Do not sit up in bed, and avoid soft couches and twisted positions. Bed rest can help relieve pain at first, but it delays healing. Avoid bed rest after the first day of back pain. · Change positions every 30 minutes. If you must sit for long periods of time, take breaks from sitting. Get up and walk around, or lie in a comfortable position. · Try using a heating pad on a low or medium setting for 15 to 20 minutes every 2 or 3 hours. Try a warm shower in place of one session with the heating pad. · You can also try an ice pack for 10 to 15 minutes every 2 to 3 hours. Put a thin cloth between the ice pack and your skin. · Take pain medicines exactly as directed. ? If the doctor gave you a prescription medicine for pain, take it as prescribed. ? If you are not taking a prescription pain medicine, ask your doctor if you can take an over-the-counter medicine. · Take short walks several times a day. You can start with 5 to 10 minutes, 3 or 4 times a day, and work up to longer walks. Walk on level surfaces and avoid hills and stairs until your back is better. · Return to work and other activities as soon as you can. Continued rest without activity is usually not good for your back. · To prevent future back pain, do exercises to stretch and strengthen your back and stomach. Learn how to use good posture, safe lifting techniques, and proper body mechanics. When should you call for help? Call your doctor now or seek immediate medical care if:  · You have new or worsening numbness in your legs. · You have new or worsening weakness in your legs. (This could make it hard to stand up.)  · You lose control of your bladder or bowels. Watch closely for changes in your health, and be sure to contact your doctor if:  · You have a fever, lose weight, or don't feel well. · You do not get better as expected. Where can you learn more? Go to http://ranjan-luis.info/  Enter I594 in the search box to learn more about \"Back Pain: Care Instructions. \"  Current as of: March 2, 2020               Content Version: 12.5  © 6774-8825 [employer]. Care instructions adapted under license by [employer] (which disclaims liability or warranty for this information). If you have questions about a medical condition or this instruction, always ask your healthcare professional. [place] [address] any warranty or liability for your use of this information.          Patient Education        Learning About Relief for Back Pain  What is back tension and strain? Back strain happens when you overstretch, or pull, a muscle in your back. You may hurt your back in an accident or when you exercise or lift something. Most back pain will get better with rest and time. You can take care of yourself at home to help your back heal.  What can you do first to relieve back pain? When you first feel back pain, try these steps:  · Walk. Take a short walk (10 to 20 minutes) on a level surface (no slopes, hills, or stairs) every 2 to 3 hours. Walk only distances you can manage without pain, especially leg pain. · Relax. Find a comfortable position for rest. Some people are comfortable on the floor or a medium-firm bed with a small pillow under their head and another under their knees. Some people prefer to lie on their side with a pillow between their knees. Don't stay in one position for too long. · Try heat or ice. Try using a heating pad on a low or medium setting, or take a warm shower, for 15 to 20 minutes every 2 to 3 hours. Or you can buy single-use heat wraps that last up to 8 hours. You can also try an ice pack for 10 to 15 minutes every 2 to 3 hours. You can use an ice pack or a bag of frozen vegetables wrapped in a thin towel. There is not strong evidence that either heat or ice will help, but you can try them to see if they help. You may also want to try switching between heat and cold. · Take pain medicine exactly as directed. ¨ If the doctor gave you a prescription medicine for pain, take it as prescribed. ¨ If you are not taking a prescription pain medicine, ask your doctor if you can take an over-the-counter medicine. What else can you do? · Stretch and exercise. Exercises that increase flexibility may relieve your pain and make it easier for your muscles to keep your spine in a good, neutral position. And don't forget to keep walking. · Do self-massage. You can use self-massage to unwind after work or school or to energize yourself in the morning. You can easily massage your feet, hands, or neck. Self-massage works best if you are in comfortable clothes and are sitting or lying in a comfortable position. Use oil or lotion to massage bare skin. · Reduce stress. Back pain can lead to a vicious [place]: Distress about the pain tenses the muscles in your back, which in turn causes more pain. Learn how to relax your mind and your muscles to lower your stress. Where can you learn more? Go to http://ranjan-luis.info/. Enter E093 in the search box to learn more about \"Learning About Relief for Back Pain. \"  Current as of: March 21, 2017  Content Version: 11.5  © 0646-9447 [employer]. Care instructions adapted under license by [employer] (which disclaims liability or warranty for this information). If you have questions about a medical condition or this instruction, always ask your healthcare professional. [address] any warranty or liability for your use of this information.

## 2020-08-17 NOTE — ED PROVIDER NOTES
EMERGENCY DEPARTMENT HISTORY AND PHYSICAL EXAM    3:07 PM      Date: 8/17/2020  Patient Name: Alicia Garcia    History of Presenting Illness     Chief Complaint   Patient presents with    Back Pain    Leg Pain    Hip Pain         History Provided By: Patient    Additional History (Context): Alicia Garcia is a 37 y.o. female with back pain, scoliosis, chronic hip pain, cervical radiculopathy, depression, DM, hypothyroidism who presents with complaints of bilateral lower back pain, bilateral hip pain, with radiation of pain down the back of her legs that have been present for the last 5 days. She states she has called her primary care provider who is referred her to a pain management specialist.  The patient states she has an appointment with the pain management specialist on 8/24/2020. Patient states she has been taking Flexeril, and alternating Tylenol and Aleve at home with some improvement of her pain. She denies any trauma or injury to her back, hips, or legs. She states that the pain is intermittent in nature. She denies any fevers, IV drug use, bladder or bowel incontinence, or saddle paresthesias. Patient denies any chills, chest pain, shortness of breath, abdominal pain, nausea, vomiting, diarrhea, dysuria hematuria. PCP: Wayne Jones NP    Current Outpatient Medications   Medication Sig Dispense Refill    acetaminophen (TYLENOL) 500 mg tablet Take 2 Tabs by mouth three (3) times daily as needed for Pain. 30 Tab 0    naproxen (Naprosyn) 500 mg tablet Take 1 Tab by mouth two (2) times daily (with meals) for 10 days. 20 Tab 0    cetirizine (ZYRTEC) 10 mg tablet Take 10 mg by mouth daily.  atorvastatin (LIPITOR) 40 mg tablet Take 1 Tab by mouth nightly. 90 Tab 0    lisinopril-hydroCHLOROthiazide (PRINZIDE, ZESTORETIC) 20-12.5 mg per tablet Take 1 Tab by mouth daily.  90 Tab 1    cyclobenzaprine (FLEXERIL) 10 mg tablet Take 1 Tab by mouth three (3) times daily as needed for Muscle Spasm(s). 90 Tab 3    topiramate (TOPAMAX) 25 mg tablet Take 1 Tab by mouth two (2) times a day. 60 Tab 2    naproxen sodium (ALEVE) 220 mg tablet Take 220 mg by mouth two (2) times daily (with meals).  docusate sodium (COLACE) 100 mg capsule Take 100 mg by mouth.  levothyroxine (SYNTHROID) 175 mcg tablet TAKE 1 TABLET BY MOUTH ONCE DAILY BEFORE BREAKFAST 30 Tab 0       Past History     Past Medical History:  Past Medical History:   Diagnosis Date    Back pain     Cervical paraspinal muscle spasm     Cervical radiculopathy     Chronic hip pain     Depression     Diabetes (HCC)     History of asthma     History of bronchitis     Hypertension     Hypothyroid     Prediabetes     Sciatica     Scoliosis     Sickle cell trait (HCC)        Past Surgical History:  Past Surgical History:   Procedure Laterality Date    HX CARPAL TUNNEL RELEASE Right     HX HYSTERECTOMY      HX ORTHOPAEDIC      cyst removed right wrist    HX THYROIDECTOMY         Family History:  Family History   Problem Relation Age of Onset    Asthma Mother     Tuberculosis Mother     Alcohol abuse Mother     Drug Abuse Mother     Diabetes Father     Asthma Father     Heart Disease Father     Sickle Cell Anemia Sister     Depression Brother     Lung Cancer Maternal Grandmother     Hypertension Maternal Grandmother     Colon Cancer Paternal Grandmother     Emphysema Paternal Grandmother     Depression Brother     Depression Brother     Depression Brother        Social History:  Social History     Tobacco Use    Smoking status: Current Every Day Smoker     Packs/day: 0.50    Smokeless tobacco: Never Used   Substance Use Topics    Alcohol use: No    Drug use: No       Allergies: Allergies   Allergen Reactions    Tramadol Nausea and Vomiting         Review of Systems       Review of Systems   Constitutional: Negative. HENT: Negative. Respiratory: Negative. Cardiovascular: Negative. Gastrointestinal: Negative. Genitourinary: Negative. Musculoskeletal: Positive for arthralgias, back pain and myalgias. Negative for neck pain and neck stiffness. Skin: Negative. Neurological: Negative. All other systems reviewed and are negative. Physical Exam     Visit Vitals  /90 (BP 1 Location: Left arm, BP Patient Position: At rest)   Pulse (!) 101   Temp 98.2 °F (36.8 °C)   Resp 18   Ht 6' 1\" (1.854 m)   Wt 93.4 kg (206 lb)   SpO2 100%   BMI 27.18 kg/m²         Physical Exam  Vitals signs and nursing note reviewed. Constitutional:       General: She is not in acute distress. Appearance: Normal appearance. She is not ill-appearing, toxic-appearing or diaphoretic. HENT:      Head: Normocephalic and atraumatic. Right Ear: External ear normal.      Left Ear: External ear normal.      Nose: Nose normal.      Mouth/Throat:      Mouth: Mucous membranes are moist.      Pharynx: Oropharynx is clear. Eyes:      Extraocular Movements: Extraocular movements intact. Neck:      Musculoskeletal: Neck supple. Cardiovascular:      Rate and Rhythm: Normal rate and regular rhythm. Pulses: Normal pulses. Heart sounds: Normal heart sounds. No murmur. No gallop. Pulmonary:      Effort: Pulmonary effort is normal.      Breath sounds: Normal breath sounds. No wheezing, rhonchi or rales. Abdominal:      General: Bowel sounds are normal. There is no distension. Palpations: Abdomen is soft. There is no mass. Tenderness: There is no abdominal tenderness. There is no guarding or rebound. Musculoskeletal: Normal range of motion. General: Tenderness present. No swelling, deformity or signs of injury. Right lower leg: No edema. Left lower leg: No edema. Comments: Tenderness palpation of the lower lumbar bilateral paraspinal muscles with radiation to the buttock. There is no tenderness to palpation of the hip or pelvis.   No tenderness to palpation to either knee patient has full range of motion of bilateral hips, knees, and ankles. Patient is ambulatory to evaluation. Skin:     General: Skin is dry. Capillary Refill: Capillary refill takes less than 2 seconds. Neurological:      General: No focal deficit present. Mental Status: She is alert and oriented to person, place, and time. Cranial Nerves: No cranial nerve deficit. Sensory: No sensory deficit. Motor: No weakness. Gait: Gait normal.           Diagnostic Study Results     Labs -  No results found for this or any previous visit (from the past 12 hour(s)). Radiologic Studies -   No orders to display         Medical Decision Making   I am the first provider for this patient. I reviewed the vital signs, available nursing notes, past medical history, past surgical history, family history and social history. Vital Signs-Reviewed the patient's vital signs. Records Reviewed: Nursing Notes and Old Medical Records (Time of Review: 3:07 PM)    ED Course: Progress Notes, Reevaluation, and Consults:  5877: Met with patient, reviewed history, performed physical exam.  Physical exam is largely unremarkable. There is mild tenderness palpation of the bilateral lower lumbar paraspinal muscles. There is no midline, bony, or point tenderness on exam.  Discussed symptomatic management with the patient to include taking Tylenol and Naprosyn together for the morning and evening doses, as well as combining Flexeril with the doses. The patient was advised to keep her pain management specialist appointment for 8/24/2020. Do not feel that imaging would be of high yield in this patient at this time. Provider Notes (Medical Decision Making):   42-year-old female seen in the emergency department for complaint medic back, hip, and leg pain. Physical exam as noted above.   There is muscular tenderness to palpation on exam.  No midline, bony, or point tenderness on exam. No red flag signs or symptoms that warrant emergent imaging at this time in the emergency department. Do not feel imaging or further work-up would be beneficial in this patient at this time. Patient will be given prescriptions for symptomatic management, she was advised to follow-up with her primary care provider for reassessment. She was advised to keep her pain management specialist appointment. She was advised to return to the emergency department if symptoms worsen, or as needed. Diagnosis     Clinical Impression:   1. Bilateral low back pain with bilateral sciatica, unspecified chronicity    2. Bilateral hip pain        Disposition: home     Follow-up Information     Follow up With Specialties Details Why Contact Info    Marcia Laboy NP Nurse Practitioner Call in 1 day For follow up regarding ER visit. [address]  [address]  [address] [place] [address] EMERGENCY DEPT Emergency Medicine  Immediately if symptoms worsen, As needed. [address] [id number]  [phone]           Patient's Medications   Start Taking    ACETAMINOPHEN (TYLENOL) 500 MG TABLET    Take 2 Tabs by mouth three (3) times daily as needed for Pain. NAPROXEN (NAPROSYN) 500 MG TABLET    Take 1 Tab by mouth two (2) times daily (with meals) for 10 days. Continue Taking    ATORVASTATIN (LIPITOR) 40 MG TABLET    Take 1 Tab by mouth nightly. CETIRIZINE (ZYRTEC) 10 MG TABLET    Take 10 mg by mouth daily. CYCLOBENZAPRINE (FLEXERIL) 10 MG TABLET    Take 1 Tab by mouth three (3) times daily as needed for Muscle Spasm(s). DOCUSATE SODIUM (COLACE) 100 MG CAPSULE    Take 100 mg by mouth. LEVOTHYROXINE (SYNTHROID) 175 MCG TABLET    TAKE 1 TABLET BY MOUTH ONCE DAILY BEFORE BREAKFAST    LISINOPRIL-HYDROCHLOROTHIAZIDE (PRINZIDE, ZESTORETIC) 20-12.5 MG PER TABLET    Take 1 Tab by mouth daily.     NAPROXEN SODIUM (ALEVE) 220 MG TABLET    Take 220 mg by mouth two (2) times daily (with meals). TOPIRAMATE (TOPAMAX) 25 MG TABLET    Take 1 Tab by mouth two (2) times a day. These Medications have changed    No medications on file   Stop Taking    ACETAMINOPHEN (TYLENOL EXTRA STRENGTH) 500 MG TABLET    Take 1,000 mg by mouth every six (6) hours as needed for Pain. DIPHENHYDRAMINE (ALLERGY) 25 MG TABLET    Take 25 mg by mouth every six (6) hours as needed for Sleep. FAMOTIDINE (PEPCID) 20 MG TABLET    Take 1 Tab by mouth two (2) times a day. IBUPROFEN (MOTRIN) 200 MG TABLET    Take  by mouth. Osman Esparza PA-C    Dictation disclaimer:  Please note that this dictation was completed with [employer], the computer voice recognition software. Quite often unanticipated grammatical, syntax, homophones, and other interpretive errors are inadvertently transcribed by the computer software. Please disregard these errors. Please excuse any errors that have escaped final proofreading.

## 2020-08-17 NOTE — ED TRIAGE NOTES
Patient c/o right hip pain, lower back pain and bilateral leg pain x 5 days. Attributes pain to scoliosis. States taking Aleve and Flexeril earlier today at 1000.

## 2020-09-09 NOTE — PROGRESS NOTES
[facility]  [address], [address]  Phone: [phone]  Fax: [phone]        INITIAL CONSULTATION      HISTORY OF PRESENT ILLNESS:  Bakari Mcnair is a 41 y.o. female whom is self-referred secondary to chronic progressive low back and hip pain radiating into the BLE to the knee x 5 years without specific trauma. She rates her pain 6/10. Her pain is not exacerbated positionally. She previously took Topamax for seizures. She reports intolerance to TOPAMAX and NEURONTIN. She previously received spinal injections 2 years ago in [place], with temporary benefit. She completed PT 3-4 years ago without benefit. She performs her HEP every other day. Patient denies previous spinal surgery. Pt denies change in bowel or bladder habits. Pt denies fever, weight loss, or skin changes. She has previously seen orthopedists in [place] for hip bursitis. PmHx of depression, DM, hysterectomy. Pt was previously followed by [facility] for pain management. She is followed by [facility] pain management. She has an MRI scheduled on 9/16/2020. Note from Dr. Bronson Lake dated 9/10/2019 indicating patient was seen for bilateral trochanteric bursitis. Note from Dr. Andrea Carbone dated 9/16/2019 indicating patient has h/o seizures. Note from Dr. Deanna Dias dated 4/17/2020 indicating patient has h/o migraines. Note from Juanito Sanchez NP dated 5/4/2020 indicating patient has chronic pain syndrome. She has treated with hydrocodone. Started her on prednisone taper. Pt is also having intermittent rash and hives on her face x 2 months. She noticed the areas when she takes synthroid. ER note from [address] [place] [address],[address] dated 8/17/2020 indicating patient presented for low back pain, chronic hip pain. Reports pain radiates down the legs x 5 days. States she has called PCP who has referred her to pain management specialist. She reported she had an appointment scheduled with pain management on 8/24/2020.  Note from [facility] pain management indicating patient was seen with c/o bilateral hip pain x 2012. She was dx with bilateral trochanteric bursitis. She was previously dx with scoliosis and degenerative disc disease. Her pain at the time was 8/10. She has previously received trigger point and SI joint injections. She has completed PT. She did not tolerate Tramadol secondary to nausea and vomiting and oxycodone was too strong. Ordered L spine MRI and gave her Norco. She should f/u in 1 month. The patient is RHD.  reviewed. Body mass index is 28.05 kg/m². PCP: Tao Jones NP    Past Medical History:   Diagnosis Date    Back pain     Cervical paraspinal muscle spasm     Cervical radiculopathy     Chronic hip pain     Depression     Diabetes ([address]     History of asthma     History of bronchitis     Hypertension     Hypothyroid     Prediabetes     Sciatica     Scoliosis     Sickle cell trait (HCC)           Past Surgical History:   Procedure Laterality Date    HX CARPAL TUNNEL RELEASE Right     HX HYSTERECTOMY      HX ORTHOPAEDIC      cyst removed right wrist    HX THYROIDECTOMY           Social History     Tobacco Use    Smoking status: Current Every Day Smoker     Packs/day: 0.50    Smokeless tobacco: Never Used   Substance Use Topics    Alcohol use: No       Work status: The patient is unemployed. Marital status: . Current Outpatient Medications   Medication Sig Dispense Refill    HYDROcodone-acetaminophen (NORCO) 5-325 mg per tablet TK 1 T PO BID PRN      cetirizine (ZYRTEC) 10 mg tablet Take 10 mg by mouth daily.  lisinopril-hydroCHLOROthiazide (PRINZIDE, ZESTORETIC) 20-12.5 mg per tablet Take 1 Tab by mouth daily. 90 Tab 1    cyclobenzaprine (FLEXERIL) 10 mg tablet Take 1 Tab by mouth three (3) times daily as needed for Muscle Spasm(s). 90 Tab 3    docusate sodium (COLACE) 100 mg capsule Take 100 mg by mouth.       levothyroxine (SYNTHROID) 175 mcg tablet TAKE 1 TABLET BY MOUTH ONCE DAILY BEFORE BREAKFAST 30 Tab 0    acetaminophen (TYLENOL) 500 mg tablet Take 2 Tabs by mouth three (3) times daily as needed for Pain. (Patient not taking: Reported on 9/10/2020) 30 Tab 0    atorvastatin (LIPITOR) 40 mg tablet Take 1 Tab by mouth nightly. (Patient not taking: Reported on 9/10/2020) 90 Tab 0    topiramate (TOPAMAX) 25 mg tablet Take 1 Tab by mouth two (2) times a day. (Patient not taking: Reported on 9/10/2020) 60 Tab 2    naproxen sodium (ALEVE) 220 mg tablet Take 220 mg by mouth two (2) times daily (with meals). Allergies   Allergen Reactions    Tramadol Nausea and Vomiting            Family History   Problem Relation Age of Onset    Asthma Mother     Tuberculosis Mother     Alcohol abuse Mother     Drug Abuse Mother     Diabetes Father     Asthma Father     Heart Disease Father     Sickle Cell Anemia Sister     Depression Brother     Lung Cancer Maternal Grandmother     Hypertension Maternal Grandmother     Colon Cancer Paternal Grandmother     Emphysema Paternal Grandmother     Depression Brother     Depression Brother     Depression Brother          REVIEW OF SYSTEMS  Constitutional symptoms: Negative  Eyes: Negative  Ears, Nose, Throat, and Mouth: Negative  Cardiovascular: Negative  Respiratory: Negative  Genitourinary: Negative  Integumentary (Skin and/or breast): Negative  Musculoskeletal: Positive for low back and hip pain radiating into the BLE to the knee. Extremities: Negative for edema.   Endocrine/Rheumatologic: Negative  Hematologic/Lymphatic: Negative  Allergic/Immunologic: Negative  Psychiatric: Negative       PHYSICAL EXAMINATION  Visit Vitals  /87 (BP 1 Location: Left arm, BP Patient Position: Sitting)   Pulse 94   Temp 97.4 °F (36.3 °C) (Temporal)   Ht 6' 1\" (1.854 m)   Wt 212 lb 9.6 oz (96.4 kg)   SpO2 97%   BMI 28.05 kg/m²       CONSTITUTIONAL: NAD, A&O x 3  HEART: Regular rate and rhythm  GASTROINTESTINAL: Positive bowel sounds, soft, nontender, and nondistended  LUNGS: Clear to auscultation bilaterally. SKIN: Negative for rash. RANGE OF MOTION: The patient has full passive range of motion in all four extremities. SENSATION: Sensation is intact to light touch throughout. MOTOR:   Straight Leg Raise: Negative, bilateral  [place]: Negative, bilateral  Deep tendon reflexes are 1 at the biceps, 0 at the triceps, and 0 at the brachioradialis bilaterally. Deep tendon reflexes are 0 at the knees and ankles bilaterally. Shoulder AB/Flex Elbow Flex Wrist Ext Elbow Ext Wrist Flex Hand Intrin Tone   Right +4/5 +4/5 +4/5 +4/5 +4/5 +4/5 +4/5   Left +4/5 +4/5 +4/5 +4/5 +4/5 +4/5 +4/5              Hip Flex Knee Ext Knee Flex Ankle DF GTE Ankle PF Tone   Right +4/5 +4/5 +4/5 +4/5 +4/5 +4/5 +4/5   Left +4/5 +4/5 +4/5 +4/5 +4/5 +4/5 +4/5     RADIOGRAPHS  Preliminary reading of L spine plain films dated 9/10/2020 revealed: Moderate disc space narrowing at L4-5. Small anterior osteophytes noted at L4 and L5. No acute pathology identified. These are being sent out for official reading by Dr. Cesar Ayala. ASSESSMENT   Diagnoses and all orders for this visit:    1. Low back pain at multiple sites  -     AMB POC XRAY, SPINE, LUMBOSACRAL; 2 O    2. Lumbosacral spondylosis without myelopathy    3. Lumbar neuritis    4. DDD (degenerative disc disease), lumbar           IMPRESSIONS/RECOMMENDATIONS:  Patient presents today with c/o  low back and hip pain radiating into the BLE to the knee. Multiple treatment options were discussed. I recommended she increase the frequency of HEP to daily. Pt has a L spine MRI scheduled on 9/16/2020. I advised patient to bring copies of films to next visit. Pt is going to attempt to get records from the provider in [place] who performed her spinal injections. Patient is neurologically intact. I will see the patient back following the MRI or earlier if needed.       Written by Savita Negrete, as dictated by Luc Davis MD  I examined the patient, reviewed and agree with the note.

## 2020-09-10 ENCOUNTER — OFFICE VISIT (OUTPATIENT)
Dept: ORTHOPEDIC SURGERY | Age: 40
End: 2020-09-10

## 2020-09-10 VITALS
WEIGHT: 212.6 LBS | TEMPERATURE: 97.4 F | OXYGEN SATURATION: 97 % | SYSTOLIC BLOOD PRESSURE: 129 MMHG | DIASTOLIC BLOOD PRESSURE: 87 MMHG | HEART RATE: 94 BPM | HEIGHT: 72 IN | BODY MASS INDEX: 28.79 KG/M2

## 2020-09-10 DIAGNOSIS — M54.50 LOW BACK PAIN AT MULTIPLE SITES: Primary | ICD-10-CM

## 2020-09-10 DIAGNOSIS — M54.16 LUMBAR NEURITIS: ICD-10-CM

## 2020-09-10 DIAGNOSIS — M51.36 DDD (DEGENERATIVE DISC DISEASE), LUMBAR: ICD-10-CM

## 2020-09-10 DIAGNOSIS — M47.817 LUMBOSACRAL SPONDYLOSIS WITHOUT MYELOPATHY: ICD-10-CM

## 2020-09-10 RX ORDER — HYDROCODONE BITARTRATE AND ACETAMINOPHEN 5; 325 MG/1; MG/1
TABLET ORAL
COMMUNITY
Start: 2020-08-26

## 2020-09-10 NOTE — LETTER
9/10/20 Patient: Errol Peacock YOB: 1980 Date of Visit: 9/10/2020 Amelia Matt NP 
[address] [address] VIA In Basket Dear Amelia Matt NP, Thank you for referring Ms. Manuel Ramirez to [address] for evaluation. My notes for this consultation are attached. If you have questions, please do not hesitate to call me. I look forward to following your patient along with you. Sincerely, Deyanira Lutz MD

## 2020-09-16 ENCOUNTER — HOSPITAL ENCOUNTER (OUTPATIENT)
Dept: GENERAL RADIOLOGY | Age: 40
Discharge: HOME | End: 2020-09-16
Attending: PHYSICAL MEDICINE & REHABILITATION
Payer: MEDICARE

## 2020-09-16 ENCOUNTER — HOSPITAL ENCOUNTER (OUTPATIENT)
Age: 40
Discharge: HOME | End: 2020-09-16
Attending: PHYSICAL MEDICINE & REHABILITATION
Payer: MEDICARE

## 2020-09-16 DIAGNOSIS — M25.552 BILATERAL HIP PAIN: ICD-10-CM

## 2020-09-16 DIAGNOSIS — M25.551 BILATERAL HIP PAIN: ICD-10-CM

## 2020-09-16 DIAGNOSIS — M51.36 DEGENERATION OF LUMBAR INTERVERTEBRAL DISC: ICD-10-CM

## 2020-09-16 PROCEDURE — 73521 X-RAY EXAM HIPS BI 2 VIEWS: CPT

## 2020-09-22 ENCOUNTER — HOSPITAL ENCOUNTER (OUTPATIENT)
Age: 40
Discharge: HOME | End: 2020-09-22
Attending: PHYSICAL MEDICINE & REHABILITATION
Payer: MEDICARE

## 2020-09-22 PROCEDURE — 72148 MRI LUMBAR SPINE W/O DYE: CPT

## 2020-10-02 DIAGNOSIS — I10 ESSENTIAL HYPERTENSION: ICD-10-CM

## 2020-10-04 RX ORDER — LISINOPRIL AND HYDROCHLOROTHIAZIDE 12.5; 2 MG/1; MG/1
TABLET ORAL
Qty: 90 TAB | Refills: 1 | Status: SHIPPED | OUTPATIENT
Start: 2020-10-04 | End: 2021-09-30 | Stop reason: SDUPTHER

## 2020-10-07 DIAGNOSIS — M79.10 MYALGIA: ICD-10-CM

## 2020-10-07 NOTE — TELEPHONE ENCOUNTER
Last Visit: 7/20/20 with VITALY Santamaria  Next Appointment: Advised to follow-up in 2 months  Previous Refill Encounter(s): 4/13/20 #90 with 3 refills    Requested Prescriptions     Pending Prescriptions Disp Refills    cyclobenzaprine (FLEXERIL) 10 mg tablet 90 Tab 3     Sig: Take 1 Tab by mouth three (3) times daily as needed for Muscle Spasm(s).

## 2020-10-09 RX ORDER — CYCLOBENZAPRINE HCL 10 MG
10 TABLET ORAL
Qty: 90 TAB | Refills: 3 | Status: SHIPPED | OUTPATIENT
Start: 2020-10-09 | End: 2021-04-19 | Stop reason: SDUPTHER

## 2020-10-21 NOTE — PROGRESS NOTES
[facility] 
[address], [place]  Phone: [phone] Fax: [phone] PROGRESS NOTE HISTORY OF PRESENT ILLNESS: 
The patient is a 41 y.o. female and was seen today for follow up of chronic progressive low back and hip pain radiating into the BLE (RLE>LLE) circumferentially to the knee x 5 years without specific trauma. Her pain is not exacerbated positionally. She previously took Topamax for seizures. She reports intolerance to TOPAMAX and NEURONTIN. She previously received spinal injections 2 years ago in [place], with temporary benefit. She completed PT 3-4 years ago without benefit. She performs her HEP every other day. Patient denies previous spinal surgery. Pt denies change in bowel or bladder habits. Pt denies fever, weight loss, or skin changes. She has previously seen orthopedists in [place] for hip bursitis. The patient is RHD. PmHx of depression, DM, hysterectomy. Pt was previously followed by [facility] for pain management. She is followed by [facility] pain management. She has an MRI scheduled on 9/16/2020. Note from Dr. Elijah Avitia dated 9/10/2019 indicating patient was seen for bilateral trochanteric bursitis. Note from Dr. Delmy Squires dated 9/16/2019 indicating patient has h/o seizures. Note from Dr. Dilcia Pollard dated 4/17/2020 indicating patient has h/o migraines. Note from Stephan Segura NP dated 5/4/2020 indicating patient has chronic pain syndrome. She has treated with hydrocodone. Started her on prednisone taper. Pt is also having intermittent rash and hives on her face x 2 months. She noticed the areas when she takes synthroid. ER note from [address] [place] [address],[address] dated 8/17/2020 indicating patient presented for low back pain, chronic hip pain. Reports pain radiates down the legs x 5 days.  States she has called PCP who has referred her to pain management specialist. She reported she had an appointment scheduled with pain management on 8/24/2020. Note from [facility] pain management indicating patient was seen with c/o bilateral hip pain x 2012. She was dx with bilateral trochanteric bursitis. She was previously dx with scoliosis and degenerative disc disease. Her pain at the time was 8/10. She has previously received trigger point and SI joint injections. She has completed PT. She did not tolerate Tramadol secondary to nausea and vomiting and oxycodone was too strong. Ordered L spine MRI and gave her Norco. She should f/u in 1 month. Preliminary reading of L spine plain films dated 9/10/2020 revealed: Moderate disc space narrowing at L4-5. Small anterior osteophytes noted at L4 and L5. No acute pathology identified. At her last clinical appointment, I recommended she increase the frequency of HEP to daily. Pt had a L spine MRI scheduled on 9/16/2020. Pt was going to attempt to get records from the provider in [place] who performed her spinal injections. 
  
 
 
The patient returns today and reports pain location and distribution remains unchanged. She rates her pain 5/10, previously 6/10. Additionally, she endorses bilateral groin pain (R>L). Pt denies change in bowel or bladder habits. Note from Dr. Vijay Herndon, [facility] dated 5/15/2018 indicating patient was seen with c/o back and hip pain. Pt underwent epidural L3-4. L spine MRI dated 9/22/2020 films were independently reviewed. Per report, at L5/S1 there is a moderate size right paracentral/subarticular protrusion 
with associated mild to moderate right lateral recess stenosis and which 
contacts the crossing right S1 nerve root but does not clearly distort it. No high-grade spinal canal or foraminal stenosis. Annular fissures at T11/T12 and L4/L5 and multilevel Modic degenerative endplate changes.  reviewed and indicated she continues on hydrocodone through [facility] pain management. Body mass index is 28.1 kg/m². PCP: Andrew Chao NP Past Medical History:  
Diagnosis Date  Back pain  Cervical paraspinal muscle spasm  Cervical radiculopathy  Chronic hip pain  Depression  Diabetes ([facility]  History of asthma   
 History of bronchitis  Hypertension  Hypothyroid  Prediabetes  Sciatica  Scoliosis  Sickle cell trait ([facility] Social History Socioeconomic History  Marital status:  Spouse name: Not on file  Number of children: Not on file  Years of education: Not on file  Highest education level: Not on file Occupational History  Not on file Social Needs  Financial resource strain: Not on file  Food insecurity Worry: Not on file Inability: Not on file  Transportation needs Medical: Not on file Non-medical: Not on file Tobacco Use  Smoking status: Current Every Day Smoker Packs/day: 0.50  Smokeless tobacco: Never Used Substance and Sexual Activity  Alcohol use: No  
 Drug use: No  
 Sexual activity: Not on file Lifestyle  Physical activity Days per week: Not on file Minutes per session: Not on file  Stress: Not on file Relationships  Social connections Talks on phone: Not on file Gets together: Not on file Attends Shinto service: Not on file Active member of club or organization: Not on file Attends meetings of clubs or organizations: Not on file Relationship status: Not on file  Intimate partner violence Fear of current or ex partner: Not on file Emotionally abused: Not on file Physically abused: Not on file Forced sexual activity: Not on file Other Topics Concern  Not on file Social History Narrative  Not on file Current Outpatient Medications Medication Sig Dispense Refill  cyclobenzaprine (FLEXERIL) 10 mg tablet Take 1 Tab by mouth three (3) times daily as needed for Muscle Spasm(s).  90 Tab 3  
  lisinopril-hydroCHLOROthiazide (PRINZIDE, ZESTORETIC) 20-12.5 mg per tablet TAKE 1 TABLET BY MOUTH DAILY 90 Tab 1  
 HYDROcodone-acetaminophen (NORCO) 5-325 mg per tablet TK 1 T PO BID PRN    
 docusate sodium (COLACE) 100 mg capsule Take 100 mg by mouth.  levothyroxine (SYNTHROID) 175 mcg tablet TAKE 1 TABLET BY MOUTH ONCE DAILY BEFORE BREAKFAST 30 Tab 0  
 acetaminophen (TYLENOL) 500 mg tablet Take 2 Tabs by mouth three (3) times daily as needed for Pain. (Patient not taking: Reported on 9/10/2020) 30 Tab 0  
 cetirizine (ZYRTEC) 10 mg tablet Take 10 mg by mouth daily.  atorvastatin (LIPITOR) 40 mg tablet Take 1 Tab by mouth nightly. (Patient not taking: Reported on 9/10/2020) 90 Tab 0  
 topiramate (TOPAMAX) 25 mg tablet Take 1 Tab by mouth two (2) times a day. (Patient not taking: Reported on 9/10/2020) 60 Tab 2  
 naproxen sodium (ALEVE) 220 mg tablet Take 220 mg by mouth two (2) times daily (with meals). Allergies Allergen Reactions  Tramadol Nausea and Vomiting PHYSICAL EXAMINATION Visit Vitals BP (!) 133/92 (BP 1 Location: Left arm, BP Patient Position: Sitting) Pulse 100 Temp 97.1 °F (36.2 °C) (Temporal) Wt 213 lb (96.6 kg) SpO2 100% BMI 28.10 kg/m² CONSTITUTIONAL: NAD, A&O x 3 SENSATION: Intact to light touch throughout RANGE OF MOTION: The patient has full passive range of motion in all four extremities. MOTOR: 
Straight Leg Raise: Negative, bilateral 
 
 
         
 Hip Flex Knee Ext Knee Flex Ankle DF GTE Ankle PF Tone Right +4/5 +4/5 +4/5 +4/5 +4/5 +4/5 +4/5 Left +4/5 +4/5 +4/5 +4/5 +4/5 +4/5 +4/5 ASSESSMENT Diagnoses and all orders for this visit: 
 
1. HNP (herniated nucleus pulposus), lumbar 
-     SCHEDULE SURGERY 2. Lumbar neuritis 
-     SCHEDULE SURGERY 3. DDD (degenerative disc disease), lumbar 
-     SCHEDULE SURGERY 4. Low back pain at multiple sites 
-     SCHEDULE SURGERY 5. Lumbosacral spondylosis without myelopathy 
-     SCHEDULE SURGERY IMPRESSION AND PLAN: 
Patient returns to the office today with c/o ***. Multiple treatment options were discussed. *** Patient is neurologically intact. *** I will see the patient back in *** month's time or earlier if needed. Written by Meredith Oconnor, as dictated by Lionel Seymour MD 
I examined the patient, reviewed and agree with the note.

## 2020-10-22 ENCOUNTER — OFFICE VISIT (OUTPATIENT)
Dept: ORTHOPEDIC SURGERY | Age: 40
End: 2020-10-22
Payer: MEDICARE

## 2020-10-22 VITALS
BODY MASS INDEX: 28.1 KG/M2 | WEIGHT: 213 LBS | TEMPERATURE: 97.1 F | SYSTOLIC BLOOD PRESSURE: 133 MMHG | OXYGEN SATURATION: 100 % | HEART RATE: 100 BPM | DIASTOLIC BLOOD PRESSURE: 92 MMHG

## 2020-10-22 DIAGNOSIS — M54.16 LUMBAR NEURITIS: ICD-10-CM

## 2020-10-22 DIAGNOSIS — M51.26 HNP (HERNIATED NUCLEUS PULPOSUS), LUMBAR: Primary | ICD-10-CM

## 2020-10-22 DIAGNOSIS — M54.50 LOW BACK PAIN AT MULTIPLE SITES: ICD-10-CM

## 2020-10-22 DIAGNOSIS — M51.36 DDD (DEGENERATIVE DISC DISEASE), LUMBAR: ICD-10-CM

## 2020-10-22 DIAGNOSIS — M47.817 LUMBOSACRAL SPONDYLOSIS WITHOUT MYELOPATHY: ICD-10-CM

## 2020-10-22 PROCEDURE — G9717 DOC PT DX DEP/BP F/U NT REQ: HCPCS | Performed by: PHYSICAL MEDICINE & REHABILITATION

## 2020-10-22 PROCEDURE — G8419 CALC BMI OUT NRM PARAM NOF/U: HCPCS | Performed by: PHYSICAL MEDICINE & REHABILITATION

## 2020-10-22 PROCEDURE — G8755 DIAS BP > OR = 90: HCPCS | Performed by: PHYSICAL MEDICINE & REHABILITATION

## 2020-10-22 PROCEDURE — G8752 SYS BP LESS 140: HCPCS | Performed by: PHYSICAL MEDICINE & REHABILITATION

## 2020-10-22 PROCEDURE — G8427 DOCREV CUR MEDS BY ELIG CLIN: HCPCS | Performed by: PHYSICAL MEDICINE & REHABILITATION

## 2020-10-22 PROCEDURE — 99214 OFFICE O/P EST MOD 30 MIN: CPT | Performed by: PHYSICAL MEDICINE & REHABILITATION

## 2020-10-22 NOTE — PROGRESS NOTES
[facility]  [address], [place]   Phone: [phone]  Fax: [phone]        PROGRESS NOTE      HISTORY OF PRESENT ILLNESS:  The patient is a 41 y.o. female and was seen today for follow up of chronic progressive low back and hip pain radiating into the BLE (RLE>LLE) circumferentially to the knee x 5 years without specific trauma. Her pain is not exacerbated positionally. She previously took Topamax for seizures. She reports intolerance to TOPAMAX and NEURONTIN. She previously received spinal injections 2 years ago in [place], with temporary benefit. She completed PT 3-4 years ago without benefit. She performs her HEP every other day. Patient denies previous spinal surgery. Pt denies change in bowel or bladder habits. Pt denies fever, weight loss, or skin changes. She has previously seen orthopedists in [place] for hip bursitis. The patient is RHD. pt is a smoker. PmHx of depression, DM, hysterectomy. Pt was previously followed by [facility] for pain management. She is followed by [facility]. Note from Dr. Orlando Diaz 9/10/2019 indicating patient was seen for bilateral trochanteric bursitis. Note from Dr. Marianna Mcclain 9/16/2019 indicating patient has h/o seizures. Note from Dr. Jenn Mccann 4/17/2020 indicating patient has h/o migraines. Note from Kimmy Thayer NP dated 5/4/2020 indicating patient has chronic pain syndrome. She has treated with hydrocodone. Started her on prednisone taper. Pt is also having intermittent rash and hives on her face x 2 months. She noticed the areas when she takes synthroid. ER note from CARLOTTA Quintana dated 8/17/2020 indicating patient presented for low back pain, chronic hip pain. Reports pain radiates down the legs x 5 days.  States she has called PCP who has referred her to pain management specialist. She reported she had an appointment scheduled with pain management on 8/24/2020. Note from [facility] indicating patient was seen with c/o bilateral hip pain x 2012. She was dx with bilateral trochanteric bursitis. She was previously dx with scoliosis and degenerative disc disease. Her pain at the time was 8/10. She has previously received trigger point and SI joint injections. She has completed PT. She did not tolerate Tramadol secondary to nausea and vomiting and oxycodone was too strong. Ordered L spine MRI and gave her Norco. She should f/u in 1 month. Preliminary reading of L spine plain films dated 9/10/2020 revealed: Moderate disc space narrowing at L4-5. Small anterior osteophytes noted at L4 and L5. No acute pathology identified. At her last clinical appointment, I recommended she increase the frequency of HEP to daily. Pt had a L spine MRI scheduled on 9/16/2020. Pt was going to attempt to get records from the provider in [place] who performed her spinal injections.         The patient returns today and reports pain location and distribution remains unchanged. She rates her pain 5/10, previously 6/10. Additionally, she endorses bilateral groin pain (R>L). Pt denies change in bowel or bladder habits. Note from Dr. Verónica Mcclendon, [facility] dated 5/15/2018 indicating patient was seen with c/o back and hip pain. Pt underwent an epidural L3-4. Pt indicates she received temporary relief with the epidural. L spine MRI dated 9/22/2020 films independently reviewed. Per report, at L5/S1 there is a moderate size right paracentral/subarticular protrusion with associated mild to moderate right lateral recess stenosis and which contacts the crossing right S1 nerve root but does not clearly distort it. No high-grade spinal canal or foraminal stenosis. Annular fissures at T11/T12 and L4/L5 and multilevel Modic degenerative endplate changes.         reviewed and indicated she continues on hydrocodone through [facility]. There is no height or weight on file to calculate BMI.  reviewed.  Body mass index is 28.1 kg/m².     PCP: Isi Vasquez NP      Past Medical History:   Diagnosis Date    Back pain     Cervical paraspinal muscle spasm     Cervical radiculopathy     Chronic hip pain     Depression     Diabetes ([facility] [address]     History of asthma     History of bronchitis     Hypertension     Hypothyroid     Prediabetes     Sciatica     Scoliosis     Sickle cell trait (HCC)         Social History     Socioeconomic History    Marital status:      Spouse name: Not on file    Number of children: Not on file    Years of education: Not on file    Highest education level: Not on file   Occupational History    Not on file   Social Needs    Financial resource strain: Not on file    Food insecurity     Worry: Not on file     Inability: Not on file    Transportation needs     Medical: Not on file     Non-medical: Not on file   Tobacco Use    Smoking status: Current Every Day Smoker     Packs/day: 0.50    Smokeless tobacco: Never Used   Substance and Sexual Activity    Alcohol use: No    Drug use: No    Sexual activity: Not on file   Lifestyle    Physical activity     Days per week: Not on file     Minutes per session: Not on file    Stress: Not on file   Relationships    Social connections     Talks on phone: Not on file     Gets together: Not on file     Attends [employer] service: Not on file     Active member of club or organization: Not on file     Attends meetings of clubs or organizations: Not on file     Relationship status: Not on file    Intimate partner violence     Fear of current or ex partner: Not on file     Emotionally abused: Not on file     Physically abused: Not on file     Forced sexual activity: Not on file   Other Topics Concern    Not on file   Social History Narrative    Not on file       Current Outpatient Medications   Medication Sig Dispense Refill    cyclobenzaprine (FLEXERIL) 10 mg tablet Take 1 Tab by mouth three (3) times daily as needed for Muscle Spasm(s). 90 Tab 3    lisinopril-hydroCHLOROthiazide (PRINZIDE, ZESTORETIC) 20-12.5 mg per tablet TAKE 1 TABLET BY MOUTH DAILY 90 Tab 1    HYDROcodone-acetaminophen (NORCO) 5-325 mg per tablet TK 1 T PO BID PRN      docusate sodium (COLACE) 100 mg capsule Take 100 mg by mouth.  levothyroxine (SYNTHROID) 175 mcg tablet TAKE 1 TABLET BY MOUTH ONCE DAILY BEFORE BREAKFAST 30 Tab 0    acetaminophen (TYLENOL) 500 mg tablet Take 2 Tabs by mouth three (3) times daily as needed for Pain. (Patient not taking: Reported on 9/10/2020) 30 Tab 0    cetirizine (ZYRTEC) 10 mg tablet Take 10 mg by mouth daily.  atorvastatin (LIPITOR) 40 mg tablet Take 1 Tab by mouth nightly. (Patient not taking: Reported on 9/10/2020) 90 Tab 0    topiramate (TOPAMAX) 25 mg tablet Take 1 Tab by mouth two (2) times a day. (Patient not taking: Reported on 9/10/2020) 60 Tab 2    naproxen sodium (ALEVE) 220 mg tablet Take 220 mg by mouth two (2) times daily (with meals). Allergies   Allergen Reactions    Tramadol Nausea and Vomiting          PHYSICAL EXAMINATION    Visit Vitals  BP (!) 133/92 (BP 1 Location: Left arm, BP Patient Position: Sitting)   Pulse 100   Temp 97.1 °F (36.2 °C) (Temporal)   Wt 213 lb (96.6 kg)   SpO2 100%   BMI 28.10 kg/m²       CONSTITUTIONAL: NAD, A&O x 3  SENSATION: Intact to light touch throughout  RANGE OF MOTION: The patient has full passive range of motion in all four extremities. MOTOR:  Straight Leg Raise: Negative, Bilateral                 Hip Flex Knee Ext Knee Flex Ankle DF GTE Ankle PF Tone   Right +4/5 +4/5 +4/5 +4/5 +4/5 +4/5 +4/5   Left +4/5 +4/5 +4/5 +4/5 +4/5 +4/5 +4/5       ASSESSMENT   Diagnoses and all orders for this visit:    1.  HNP (herniated nucleus pulposus), lumbar  -     SCHEDULE SURGERY    2. Lumbar neuritis  -     SCHEDULE SURGERY    3. DDD (degenerative disc disease), lumbar  -     SCHEDULE SURGERY    4. Low back pain at multiple sites  -     SCHEDULE SURGERY    5. Lumbosacral spondylosis without myelopathy  -     SCHEDULE SURGERY        IMPRESSION AND PLAN:  Patient returns to the office today with c/o chronic progressive low back and hip pain radiating into the BLE (RLE>LLE) circumferentially to the knee. Multiple treatment options were discussed. Pt elected to proceed with another block. I will order an epidural L4-5. Patient is neurologically intact. I will see the patient back following the block or earlier if needed. Written by Monty Lim, as dictated by Eliezer Chavez MD  I examined the patient, reviewed and agree with the note.

## 2020-10-22 NOTE — LETTER
10/22/20 Patient: Quentin Foley YOB: 1980 Date of Visit: 10/22/2020 Tonie Canseco NP 
[address] [facility] [address] VIA In Basket Dear Tonie Canseco NP, Thank you for referring Ms. Savi Mendez to [address] for evaluation. My notes for this consultation are attached. If you have questions, please do not hesitate to call me. I look forward to following your patient along with you. Sincerely, Shantal Almendarez MD

## 2020-10-29 ENCOUNTER — HOSPITAL ENCOUNTER (OUTPATIENT)
Age: 40
Setting detail: OUTPATIENT SURGERY
Discharge: HOME | End: 2020-10-29
Attending: PHYSICAL MEDICINE & REHABILITATION | Admitting: PHYSICAL MEDICINE & REHABILITATION
Payer: MEDICARE

## 2020-10-29 ENCOUNTER — PATIENT MESSAGE (OUTPATIENT)
Dept: FAMILY MEDICINE CLINIC | Age: 40
End: 2020-10-29

## 2020-10-29 ENCOUNTER — APPOINTMENT (OUTPATIENT)
Dept: GENERAL RADIOLOGY | Age: 40
End: 2020-10-29
Attending: PHYSICAL MEDICINE & REHABILITATION
Payer: MEDICARE

## 2020-10-29 VITALS
TEMPERATURE: 98.9 F | RESPIRATION RATE: 20 BRPM | DIASTOLIC BLOOD PRESSURE: 105 MMHG | SYSTOLIC BLOOD PRESSURE: 156 MMHG | OXYGEN SATURATION: 93 % | HEART RATE: 77 BPM

## 2020-10-29 DIAGNOSIS — E11.65 TYPE 2 DIABETES MELLITUS WITH HYPERGLYCEMIA, WITHOUT LONG-TERM CURRENT USE OF INSULIN (HCC): Primary | ICD-10-CM

## 2020-10-29 LAB
GLUCOSE BLD STRIP.AUTO-MCNC: 185 MG/DL (ref 70–110)
GLUCOSE BLD STRIP.AUTO-MCNC: 216 MG/DL (ref 70–110)
GLUCOSE BLD STRIP.AUTO-MCNC: 216 MG/DL (ref 70–110)
GLUCOSE BLD STRIP.AUTO-MCNC: 218 MG/DL (ref 70–110)
GLUCOSE BLD STRIP.AUTO-MCNC: 222 MG/DL (ref 70–110)

## 2020-10-29 PROCEDURE — 74011250636 HC RX REV CODE- 250/636: Performed by: PHYSICAL MEDICINE & REHABILITATION

## 2020-10-29 PROCEDURE — 74011250637 HC RX REV CODE- 250/637: Performed by: PHYSICAL MEDICINE & REHABILITATION

## 2020-10-29 PROCEDURE — 76010000009 HC PAIN MGT 0 TO 30 MIN PROC: Performed by: PHYSICAL MEDICINE & REHABILITATION

## 2020-10-29 PROCEDURE — 2709999900 HC NON-CHARGEABLE SUPPLY: Performed by: PHYSICAL MEDICINE & REHABILITATION

## 2020-10-29 PROCEDURE — 74011000250 HC RX REV CODE- 250: Performed by: PHYSICAL MEDICINE & REHABILITATION

## 2020-10-29 PROCEDURE — 62323 NJX INTERLAMINAR LMBR/SAC: CPT | Performed by: PHYSICAL MEDICINE & REHABILITATION

## 2020-10-29 PROCEDURE — 77030014124 HC TY EPDRL BBMI -A: Performed by: PHYSICAL MEDICINE & REHABILITATION

## 2020-10-29 PROCEDURE — 82962 GLUCOSE BLOOD TEST: CPT

## 2020-10-29 RX ORDER — DIAZEPAM 5 MG/1
5-20 TABLET ORAL ONCE
Status: COMPLETED | OUTPATIENT
Start: 2020-10-29 | End: 2020-10-29

## 2020-10-29 RX ORDER — LIDOCAINE HYDROCHLORIDE 10 MG/ML
INJECTION, SOLUTION EPIDURAL; INFILTRATION; INTRACAUDAL; PERINEURAL AS NEEDED
Status: DISCONTINUED | OUTPATIENT
Start: 2020-10-29 | End: 2020-10-29 | Stop reason: HOSPADM

## 2020-10-29 RX ORDER — DEXAMETHASONE SODIUM PHOSPHATE 100 MG/10ML
INJECTION INTRAMUSCULAR; INTRAVENOUS AS NEEDED
Status: DISCONTINUED | OUTPATIENT
Start: 2020-10-29 | End: 2020-10-29 | Stop reason: HOSPADM

## 2020-10-29 RX ADMIN — DIAZEPAM 10 MG: 5 TABLET ORAL at 12:56

## 2020-10-29 NOTE — PROCEDURES
Intralaminar Epidural Steroid Block Procedure Note      Patient Name: Kym Dumont    Date of Procedure: October 29, 2020    Preoperative Diagnosis: Lumbar radiculopathy [M54.16]    Post Operative Diagnosis: Lumbar radiculopathy [M54.16]    Procedure: L4-L5 Epidural Block     PROCEDURE:  Lumbar Epidural Block    Consent:  Informed  Consent was obtained prior to the procedure. The patient was given the opportunity to ask questions regarding the procedure and its associated risks. In addition to the potential risks associated with the procedure itself, the patient was informed both verbally and in writing of potential side effects of the use glucocorticoids. The patient appeared to comprehend the informed consent and desired to have the procedure performed. The patient was placed in the prone position on the fluoroscopy table and the back prepped and draped in the usual sterile manner. The interlaminar space was identified using fluoroscopy and the skin anesthetized with 1% Lidocaine. A #18 Tuohy Epidural needle was advanced under fluoroscopic control from a paramedian approach to the  epidural space as noted above, using the loss of resistance to fluid technique. Then, 10 mg of preservative free Dexamethasone and 2 cc of Lidocaine was slowly injected. The patient tolerated the procedure well. The injection area was cleaned and band aids applied. No excessive bleeding was noted. Patient dressed and was discharged to home with instructions.

## 2020-10-29 NOTE — DISCHARGE INSTRUCTIONS
[facility]   (JASWANT)  Dr. Henrry Licona, Dr. Jcarlos Mustafa, Dr. Yvonne Vazquez Spinal Procedure (Block) Instructions    * Do not drive a car, operate heavy machinery or dangerous equipment, or make important decisions for 12-24 hours. * Light activity as tolerated; may rest for the remainder of the day. * Resume pre-block medications including those from your other doctors. * Do not drink alcoholic beverages for 24 hours. Alcohol and the medications you have received may interact and cause an adverse reaction. * You may feel better this evening and worse tomorrow, as the numbing medications wears off and the steroid has yet to begin to work. After 48-72 hrs the steroid should begin to release bringing you relief. If you had a medial branch block, no steroids were used. The medial branch block is a test to see if you are a candidate for radiofrequency ablation (RFA). The anesthetic (numbing medicine)  will wear off by the next day. * You may shower this evening and remove any bandages. * Avoid hot tubs/pools/tub soaks and heating pads for 24 hours. You may use cold packs on the procedure site as tolerated for the first 24 hours. * If a headache develops, drink plenty of fluids and rest.  Take over the counter medications for headache if needed. If the headache continues longer than 24 hours, call MD at the [address] [place] [address] Avenue. [phone]    * Continue taking pain medications as needed. * You may resume your regular diet if tolerated. Otherwise, start with sips of water and advance slowly. * If Diabetic: check your blood sugar three times a day for the next 3 days. If your sugar is greater than 300 call your family doctor. If your sugar is greater than 400, have someone transport you to the nearest Emergency Room. * If you experience any of the following problems, Please Call the [address] [place] [address] Avenue at [phone].         * Excessive pain, swelling, redness or odor at or around the surgical area    * Fever of 101 or higher    * Nausea / Vomiting lasting longer than 4 hours or if unable to take medications. * Severe Headache    * Weakness or numbness in arms or legs that is not      resolving   * Any NEW signs of decreased circulation or nerve impairment in leg: change in color, swelling, persistent numbness, tingling                    * Prolonged increase in pain greater than 4 days      PATIENT INSTRUCTIONS:    After oral sedation, for 12-24 hours or while taking prescription Narcotics:  · Limit your activities  · Do not drive and operate hazardous machinery  · Do not make important personal or business decisions  · Do  not drink alcoholic beverages  · If you have not urinated within 8 hours after discharge, please contact your surgeon on call. *  Please give a list of your current medications to your Primary Care Provider. *  Please update this list whenever your medications are discontinued, doses are      changed, or new medications (including over-the-counter products) are added. *  Please carry medication information at all times in case of emergency situations. These are general instructions for a healthy lifestyle:    No smoking/ No tobacco products/ Avoid exposure to second hand smoke    Surgeon General's Warning:  Quitting smoking now greatly reduces serious risk to your health. Obesity, smoking, and sedentary lifestyle greatly increases your risk for illness    A healthy diet, regular physical exercise & weight monitoring are important for maintaining a healthy lifestyle    You may be retaining fluid if you have a history of heart failure or if you experience any of the following symptoms:  Weight gain of 3 pounds or more overnight or 5 pounds in a week, increased swelling in our hands or feet or shortness of breath while lying flat in bed.   Please call your doctor as soon as you notice any of these symptoms; do not wait until your next office visit. Recognize signs and symptoms of STROKE:    F-face looks uneven    A-arms unable to move or move unevenly    S-speech slurred or non-existent    T-time-call 911 as soon as signs and symptoms begin-DO NOT go       Back to bed or wait to see if you get better-TIME IS BRAIN.

## 2020-10-30 NOTE — TELEPHONE ENCOUNTER
Patient called about lab results from yesterday and wanted to speak with tamir. Patients also wants to know if she can get a meter and strips for sugar.

## 2020-11-02 DIAGNOSIS — E11.65 TYPE 2 DIABETES MELLITUS WITH HYPERGLYCEMIA, WITHOUT LONG-TERM CURRENT USE OF INSULIN (HCC): Primary | ICD-10-CM

## 2020-11-02 RX ORDER — BLOOD-GLUCOSE CONTROL, NORMAL
EACH MISCELLANEOUS
Qty: 300 LANCET | Refills: 1 | Status: SHIPPED | OUTPATIENT
Start: 2020-11-02 | End: 2021-08-20 | Stop reason: SDUPTHER

## 2020-11-02 RX ORDER — INSULIN PUMP SYRINGE, 3 ML
EACH MISCELLANEOUS
Qty: 1 KIT | Refills: 0 | Status: SHIPPED | OUTPATIENT
Start: 2020-11-02

## 2020-11-02 RX ORDER — CALCIUM CITRATE/VITAMIN D3 200MG-6.25
TABLET ORAL
Qty: 300 STRIP | Refills: 1 | Status: SHIPPED | OUTPATIENT
Start: 2020-11-02

## 2020-11-02 NOTE — PROGRESS NOTES
Diabetic Testing supplies and monitor.   Check glucose three times daily   Per verbal order Nickie BAIRD

## 2020-11-11 ENCOUNTER — PATIENT MESSAGE (OUTPATIENT)
Dept: FAMILY MEDICINE CLINIC | Age: 40
End: 2020-11-11

## 2020-11-16 ENCOUNTER — APPOINTMENT (OUTPATIENT)
Dept: FAMILY MEDICINE CLINIC | Age: 40
End: 2020-11-16

## 2020-11-16 DIAGNOSIS — E11.65 TYPE 2 DIABETES MELLITUS WITH HYPERGLYCEMIA, WITHOUT LONG-TERM CURRENT USE OF INSULIN (HCC): ICD-10-CM

## 2020-11-17 LAB
ALBUMIN SERPL-MCNC: 4.7 G/DL (ref 3.8–4.8)
ALBUMIN/GLOB SERPL: 1.7 {RATIO} (ref 1.2–2.2)
ALP SERPL-CCNC: 71 IU/L (ref 39–117)
ALT SERPL-CCNC: 8 IU/L (ref 0–32)
AST SERPL-CCNC: 14 IU/L (ref 0–40)
BILIRUB SERPL-MCNC: 0.4 MG/DL (ref 0–1.2)
BUN SERPL-MCNC: 9 MG/DL (ref 6–24)
BUN/CREAT SERPL: 7 (ref 9–23)
CALCIUM SERPL-MCNC: 9.8 MG/DL (ref 8.7–10.2)
CHLORIDE SERPL-SCNC: 101 MMOL/L (ref 96–106)
CHOLEST SERPL-MCNC: 311 MG/DL (ref 100–199)
CO2 SERPL-SCNC: 23 MMOL/L (ref 20–29)
CREAT SERPL-MCNC: 1.25 MG/DL (ref 0.57–1)
EST. AVERAGE GLUCOSE BLD GHB EST-MCNC: 206 MG/DL
GLOBULIN SER CALC-MCNC: 2.7 G/DL (ref 1.5–4.5)
GLUCOSE SERPL-MCNC: 219 MG/DL (ref 65–99)
HBA1C MFR BLD: 8.8 % (ref 4.8–5.6)
HDLC SERPL-MCNC: 36 MG/DL
LDLC SERPL CALC-MCNC: 224 MG/DL (ref 0–99)
POTASSIUM SERPL-SCNC: 4.1 MMOL/L (ref 3.5–5.2)
PROT SERPL-MCNC: 7.4 G/DL (ref 6–8.5)
SODIUM SERPL-SCNC: 138 MMOL/L (ref 134–144)
TRIGL SERPL-MCNC: 249 MG/DL (ref 0–149)
VLDLC SERPL CALC-MCNC: 51 MG/DL (ref 5–40)

## 2020-11-19 ENCOUNTER — VIRTUAL VISIT (OUTPATIENT)
Dept: FAMILY MEDICINE CLINIC | Age: 40
End: 2020-11-19
Payer: MEDICARE

## 2020-11-19 ENCOUNTER — E-VISIT (OUTPATIENT)
Dept: FAMILY MEDICINE CLINIC | Age: 40
End: 2020-11-19

## 2020-11-19 DIAGNOSIS — E03.2 HYPOTHYROIDISM DUE TO NON-MEDICATION EXOGENOUS SUBSTANCES: ICD-10-CM

## 2020-11-19 PROCEDURE — 99213 OFFICE O/P EST LOW 20 MIN: CPT | Performed by: NURSE PRACTITIONER

## 2020-11-19 PROCEDURE — G8427 DOCREV CUR MEDS BY ELIG CLIN: HCPCS | Performed by: NURSE PRACTITIONER

## 2020-11-19 PROCEDURE — G9717 DOC PT DX DEP/BP F/U NT REQ: HCPCS | Performed by: NURSE PRACTITIONER

## 2020-11-19 PROCEDURE — G8756 NO BP MEASURE DOC: HCPCS | Performed by: NURSE PRACTITIONER

## 2020-11-19 RX ORDER — LEVOTHYROXINE SODIUM 175 UG/1
175 TABLET ORAL
Qty: 90 TAB | Refills: 1 | Status: SHIPPED | OUTPATIENT
Start: 2020-11-19 | End: 2021-07-22 | Stop reason: SDUPTHER

## 2020-11-19 RX ORDER — METFORMIN HYDROCHLORIDE 500 MG/1
500 TABLET ORAL 2 TIMES DAILY WITH MEALS
Qty: 60 TAB | Refills: 1 | Status: SHIPPED | OUTPATIENT
Start: 2020-11-19 | End: 2020-12-03 | Stop reason: SINTOL

## 2020-11-27 ENCOUNTER — PATIENT MESSAGE (OUTPATIENT)
Dept: FAMILY MEDICINE CLINIC | Age: 40
End: 2020-11-27

## 2020-11-27 LAB
SPECIMEN STATUS REPORT, ROLRST: NORMAL
T4 FREE SERPL-MCNC: 1.21 NG/DL (ref 0.82–1.77)
TSH SERPL DL<=0.005 MIU/L-ACNC: 9.29 UIU/ML (ref 0.45–4.5)

## 2020-11-30 ENCOUNTER — PATIENT MESSAGE (OUTPATIENT)
Dept: FAMILY MEDICINE CLINIC | Age: 40
End: 2020-11-30

## 2020-12-01 NOTE — PROGRESS NOTES
[facility]  [address], [place]   Phone: [phone]  Fax: [phone]        PROGRESS NOTE    CONSENT:  Pursuant to the emergency declaration under the [address] and [facility], 1135 waiver authority and the [employer] Act, this Virtual Visit was conducted, with patient's consent, to reduce the patient's risk of exposure to COVID-19 and provide continuity of care for an established patient. Services were provided through a video synchronous discussion virtually to substitute for in-person appointment. ENCOUNTER (minutes): 20 minutes 26 seconds    HISTORY OF PRESENT ILLNESS:  The patient is a 41 y.o. female and is being seen via Doxy. Me TeleVisit at the [facility] office for follow up of chronic progressive low back and hip pain radiating into the BLE (RLE>LLE) circumferentially to the knee x 5 years without specific trauma. Additionally, she endorses bilateral groin pain (R>L). Her pain is not exacerbated positionally. She previously took Topamax for seizures. She reports intolerance to TOPAMAX and NEURONTIN. She previously received spinal injections 2 years ago in [place], with temporary benefit. She completed PT 3-4 years ago without benefit. She performs her HEP every other day. Patient denies previous spinal surgery. Pt denies change in bowel or bladder habits. Pt denies fever, weight loss, or skin changes. She has previously seen orthopedists in [place] for hip bursitis. The patient is RHD. Pt is a smoker. PmHx of depression, DM, hysterectomy. Pt was previously followed by [facility] for pain management.  She is followed by [facility] pain management. Note from Dr. Noemy Brown 9/10/2019 indicating patient was seen for bilateral trochanteric bursitis. Note from Dr. Sharon Echavarria 9/16/2019 indicating patient has h/o seizures. Note from Dr. Barb Laboy 4/17/2020 indicating patient has h/o migraines. Note from Kimmy Thayer NP dated 5/4/2020 indicating patient has chronic pain syndrome. She has treated with hydrocodone. Started her on prednisone taper. Pt is also having intermittent rash and hives on her face x 2 months. She noticed the areas when she takes synthroid. ER note from CARLOTTA Quintana dated 8/17/2020 indicating patient presented for low back pain, chronic hip pain. Reports pain radiates down the legs x 5 days. States she has called PCP who has referred her to pain management specialist. She reported she had an appointment scheduled with pain management on 8/24/2020. Note from [facility] pain management indicating patient was seen with c/o bilateral hip pain x 2012. She was dx with bilateral trochanteric bursitis. She was previously dx with scoliosis and degenerative disc disease. Her pain at the time was 8/10. She has previously received trigger point and SI joint injections. She has completed PT. She did not tolerate Tramadol secondary to nausea and vomiting and oxycodone was too strong. Ordered L spine MRI and gave her Norco. She should f/u in 1 month. Note from Dr. Anthony Del Castillo, [facility] 5/15/2018 indicating patient was seen with c/o back and hip pain. Pt underwent an epidural L3-4. Pt indicates she received temporary relief with the epidural. Preliminary reading of L spine plain films dated 9/10/2020 revealed: Moderate disc space narrowing at L4-5. Small anterior osteophytes noted at L4 and L5. No acute pathology identified. L spine MRI dated 9/22/2020 films independently reviewed. Per report, at L5/S1 there is a moderate size right paracentral/subarticular protrusion with associated mild to moderate right lateral recess stenosis and which contacts the crossing right S1 nerve root but does not clearly distort it. No high-grade spinal canal or foraminal stenosis. Annular fissures at T11/T12 and L4/L5 and multilevel Modic degenerative endplate changes.  At her last clinical appointment, pt elected to proceed with another block. I ordered an epidural L4-5.      At today's video consultation, the patient reports pain location and distribution remains unchanged. She rates her pain 7/10, previously 5/10. Her pain is exacerbated by increased activity. Pt underwent epidural L4-5 on 10/29/2020 with relief. Her radicular symptoms are less intense in nature. Pt missed her f/u appointment with pain management due to sickness. Subsequently, she ran out of her pain medication about 1 week ago. She has a f/u scheduled with [facility] on 12/7/2020. She is compliant with her HEP. Pt denies change in bowel or bladder habits. Pt denies any signs of weakness.  reviewed. There is no height or weight on file to calculate BMI.     PCP: Jina Saunders NP      Past Medical History:   Diagnosis Date    Back pain     Cervical paraspinal muscle spasm     Cervical radiculopathy     Chronic hip pain     Depression     Diabetes ([facility] [address]     History of asthma     History of bronchitis     Hypertension     Hypothyroid     Prediabetes     Sciatica     Scoliosis     Sickle cell trait (HCC)         Social History     Socioeconomic History    Marital status:      Spouse name: Not on file    Number of children: Not on file    Years of education: Not on file    Highest education level: Not on file   Occupational History    Not on file   Social Needs    Financial resource strain: Not on file    Food insecurity     Worry: Not on file     Inability: Not on file    Transportation needs     Medical: Not on file     Non-medical: Not on file   Tobacco Use    Smoking status: Current Every Day Smoker     Packs/day: 0.50    Smokeless tobacco: Never Used   Substance and Sexual Activity    Alcohol use: No    Drug use: No    Sexual activity: Not on file   Lifestyle    Physical activity     Days per week: Not on file     Minutes per session: Not on file    Stress: Not on file Relationships    Social connections     Talks on phone: Not on file     Gets together: Not on file     Attends Yazidism service: Not on file     Active member of club or organization: Not on file     Attends meetings of clubs or organizations: Not on file     Relationship status: Not on file    Intimate partner violence     Fear of current or ex partner: Not on file     Emotionally abused: Not on file     Physically abused: Not on file     Forced sexual activity: Not on file   Other Topics Concern    Not on file   Social History Narrative    Not on file       Current Outpatient Medications   Medication Sig Dispense Refill    metFORMIN ER (GLUCOPHAGE XR) 750 mg tablet Take 1 Tab by mouth daily (with dinner). 30 Tab 1    levothyroxine (SYNTHROID) 175 mcg tablet Take 1 Tab by mouth Daily (before breakfast). 90 Tab 1    Blood-Glucose Meter (True Metrix Air Glucose Meter) monitoring kit Check glucose three times daily Dx Code E11.65 1 Kit 0    glucose blood VI test strips (True Metrix Glucose Test Strip) strip Check glucose three times a day DX Code E11.65 300 Strip 1    lancets (True Comfort Lancet) 30 gauge misc Check glucose three times a day  DX Code E11.65 300 Lancet 1    cyclobenzaprine (FLEXERIL) 10 mg tablet Take 1 Tab by mouth three (3) times daily as needed for Muscle Spasm(s). 90 Tab 3    lisinopril-hydroCHLOROthiazide (PRINZIDE, ZESTORETIC) 20-12.5 mg per tablet TAKE 1 TABLET BY MOUTH DAILY 90 Tab 1    HYDROcodone-acetaminophen (NORCO) 5-325 mg per tablet TK 1 T PO BID PRN      cetirizine (ZYRTEC) 10 mg tablet Take 10 mg by mouth daily.  atorvastatin (LIPITOR) 40 mg tablet Take 1 Tab by mouth nightly. 90 Tab 0    docusate sodium (COLACE) 100 mg capsule Take 100 mg by mouth.  topiramate (TOPAMAX) 25 mg tablet Take 1 Tab by mouth two (2) times a day.  (Patient not taking: Reported on 9/10/2020) 60 Tab 2    naproxen sodium (ALEVE) 220 mg tablet Take 220 mg by mouth two (2) times daily (with meals). Allergies   Allergen Reactions    Tramadol Nausea and Vomiting          PHYSICAL EXAMINATION  Unable to perform examination secondary to COVID-19. CONSTITUTIONAL: NAD, A&O x 3    ASSESSMENT   Diagnoses and all orders for this visit:    1. HNP (herniated nucleus pulposus), lumbar    2. Lumbar neuritis    3. DDD (degenerative disc disease), lumbar    4. Low back pain at multiple sites    5. Lumbosacral spondylosis without myelopathy        IMPRESSION AND PLAN:  The patient consented to the tele health visit and was aware that there would be a charge. During today's Doxy. Me TeleVisit patient had c/o low back and hip pain radiating into the BLE (RLE>LLE) circumferentially to the knee. Multiple treatment options were discussed. At this point she is unsure how she wishes to proceed. She has run out of her pain medication and is scheduled to f/u with Dr. Karlo Blair next week. She thinks she might be interested in surgical intervention. She would like to discuss this with Dr. Karlo Blair and think about it more. She will let us know when she decides what she wants to do. I recommended she increase the frequency of HEP to daily. Pt appears to be neurologically intact. I will see the patient back prn. Written by Concha Gutierrez, as dictated by Jacob Tai MD  I examined the patient, reviewed and agree with the note.

## 2020-12-03 ENCOUNTER — VIRTUAL VISIT (OUTPATIENT)
Dept: ORTHOPEDIC SURGERY | Age: 40
End: 2020-12-03
Payer: MEDICARE

## 2020-12-03 DIAGNOSIS — M51.26 HNP (HERNIATED NUCLEUS PULPOSUS), LUMBAR: Primary | ICD-10-CM

## 2020-12-03 DIAGNOSIS — M47.817 LUMBOSACRAL SPONDYLOSIS WITHOUT MYELOPATHY: ICD-10-CM

## 2020-12-03 DIAGNOSIS — M54.16 LUMBAR NEURITIS: ICD-10-CM

## 2020-12-03 DIAGNOSIS — M51.36 DDD (DEGENERATIVE DISC DISEASE), LUMBAR: ICD-10-CM

## 2020-12-03 DIAGNOSIS — M54.50 LOW BACK PAIN AT MULTIPLE SITES: ICD-10-CM

## 2020-12-03 PROCEDURE — G8428 CUR MEDS NOT DOCUMENT: HCPCS | Performed by: PHYSICAL MEDICINE & REHABILITATION

## 2020-12-03 PROCEDURE — 99213 OFFICE O/P EST LOW 20 MIN: CPT | Performed by: PHYSICAL MEDICINE & REHABILITATION

## 2020-12-03 PROCEDURE — G8419 CALC BMI OUT NRM PARAM NOF/U: HCPCS | Performed by: PHYSICAL MEDICINE & REHABILITATION

## 2020-12-03 PROCEDURE — G8756 NO BP MEASURE DOC: HCPCS | Performed by: PHYSICAL MEDICINE & REHABILITATION

## 2020-12-03 PROCEDURE — G9717 DOC PT DX DEP/BP F/U NT REQ: HCPCS | Performed by: PHYSICAL MEDICINE & REHABILITATION

## 2020-12-03 RX ORDER — METFORMIN HYDROCHLORIDE 750 MG/1
750 TABLET, EXTENDED RELEASE ORAL
Qty: 30 TAB | Refills: 1 | Status: SHIPPED | OUTPATIENT
Start: 2020-12-03 | End: 2021-01-21 | Stop reason: SDUPTHER

## 2020-12-09 RX ORDER — GLIPIZIDE 5 MG/1
2.5 TABLET ORAL 2 TIMES DAILY WITH MEALS
Qty: 30 TAB | Refills: 1 | Status: SHIPPED | OUTPATIENT
Start: 2020-12-09 | End: 2020-12-11 | Stop reason: SDUPTHER

## 2020-12-11 ENCOUNTER — OFFICE VISIT (OUTPATIENT)
Dept: ORTHOPEDIC SURGERY | Age: 40
End: 2020-12-11
Payer: MEDICARE

## 2020-12-11 VITALS
DIASTOLIC BLOOD PRESSURE: 94 MMHG | SYSTOLIC BLOOD PRESSURE: 142 MMHG | TEMPERATURE: 97.6 F | WEIGHT: 211.8 LBS | BODY MASS INDEX: 27.94 KG/M2 | HEART RATE: 96 BPM | RESPIRATION RATE: 16 BRPM

## 2020-12-11 DIAGNOSIS — M54.16 LUMBAR NEURITIS: ICD-10-CM

## 2020-12-11 DIAGNOSIS — M19.90 INFLAMMATORY ARTHROPATHY: ICD-10-CM

## 2020-12-11 DIAGNOSIS — Z82.61: ICD-10-CM

## 2020-12-11 DIAGNOSIS — M47.817 LUMBOSACRAL SPONDYLOSIS WITHOUT MYELOPATHY: Primary | ICD-10-CM

## 2020-12-11 PROCEDURE — G8755 DIAS BP > OR = 90: HCPCS | Performed by: ORTHOPAEDIC SURGERY

## 2020-12-11 PROCEDURE — G8419 CALC BMI OUT NRM PARAM NOF/U: HCPCS | Performed by: ORTHOPAEDIC SURGERY

## 2020-12-11 PROCEDURE — G8753 SYS BP > OR = 140: HCPCS | Performed by: ORTHOPAEDIC SURGERY

## 2020-12-11 PROCEDURE — G8427 DOCREV CUR MEDS BY ELIG CLIN: HCPCS | Performed by: ORTHOPAEDIC SURGERY

## 2020-12-11 PROCEDURE — 99214 OFFICE O/P EST MOD 30 MIN: CPT | Performed by: ORTHOPAEDIC SURGERY

## 2020-12-11 PROCEDURE — G9717 DOC PT DX DEP/BP F/U NT REQ: HCPCS | Performed by: ORTHOPAEDIC SURGERY

## 2020-12-11 NOTE — TELEPHONE ENCOUNTER
Patient is requesting a 90 day supply  Please sign if appropriate. Requested Prescriptions     Pending Prescriptions Disp Refills    glipiZIDE (GLUCOTROL) 5 mg tablet 90 Tab 0     Sig: Take 0.5 Tabs by mouth two (2) times daily (with meals).

## 2020-12-11 NOTE — PATIENT INSTRUCTIONS
Learning About Benefits From Quitting Smoking How does quitting smoking make you healthier? If you're thinking about quitting smoking, you may have a few reasons to be smoke-free. Your health may be one of them. · When you quit smoking, you lower your risks for cancer, lung disease, heart attack, stroke, blood vessel disease, and blindness from macular degeneration. · When you're smoke-free, you get sick less often, and you heal faster. You are less likely to get colds, flu, bronchitis, and pneumonia. · As a nonsmoker, you may find that your mood is better and you are less stressed. When and how will you feel healthier? Quitting has real health benefits that start from day 1 of being smoke-free. And the longer you stay smoke-free, the healthier you get and the better you feel. The first hours · After just 20 minutes, your blood pressure and heart rate go down. That means there's less stress on your heart and blood vessels. · Within 12 hours, the level of carbon monoxide in your blood drops back to normal. That makes room for more oxygen. With more oxygen in your body, you may notice that you have more energy than when you smoked. After 2 weeks · Your lungs start to work better. · Your risk of heart attack starts to drop. After 1 month · When your lungs are clear, you cough less and breathe deeper, so it's easier to be active. · Your sense of taste and smell return. That means you can enjoy food more than you have since you started smoking. Over the years · Over the years, your risks of heart disease, heart attack, and stroke are lower. · After 10 years, your risk of dying from lung cancer is cut by about half. And your risk for many other types of cancer is lower too. How would quitting help others in your life? When you quit smoking, you improve the health of everyone who now breathes in your smoke. · Their heart, lung, and cancer risks drop, much like yours. · They are sick less. For babies and small children, living smoke-free means they're less likely to have ear infections, pneumonia, and bronchitis. · If you're a woman who is or will be pregnant someday, quitting smoking means a healthier . · Children who are close to you are less likely to become adult smokers. Where can you learn more? Go to http://www.fung.com/ Enter 052 [id number] 11 in the search box to learn more about \"Learning About Benefits From Quitting Smoking. \" Current as of: 2020               Content Version: 12.6 © 0811-6797 [employer]. Care instructions adapted under license by [employer] (which disclaims liability or warranty for this information). If you have questions about a medical condition or this instruction, always ask your healthcare professional. [address] any warranty or liability for your use of this information. Stopping Smoking: Care Instructions Your Care Instructions Cigarette smokers crave the nicotine in cigarettes. Giving it up is much harder than simply changing a habit. Your body has to stop craving the nicotine. It is hard to quit, but you can do it. There are many tools that people use to quit smoking. You may find that combining tools works best for you. There are several steps to quitting. First you get ready to quit. Then you get support to help you. After that, you learn new skills and behaviors to become a nonsmoker. For many people, a necessary step is getting and using medicine. Your doctor will help you set up the plan that best meets your needs. You may want to attend a smoking cessation program to help you quit smoking. When you choose a program, look for one that has proven success. Ask your doctor for ideas.  You will greatly increase your chances of success if you take medicine as well as get counseling or join a cessation program. 
 Some of the changes you feel when you first quit tobacco are uncomfortable. Your body will miss the nicotine at first, and you may feel short-tempered and grumpy. You may have trouble sleeping or concentrating. Medicine can help you deal with these symptoms. You may struggle with changing your smoking habits and rituals. The last step is the tricky one: Be prepared for the smoking urge to continue for a time. This is a lot to deal with, but keep at it. You will feel better. Follow-up care is a key part of your treatment and safety. Be sure to make and go to all appointments, and call your doctor if you are having problems. It's also a good idea to know your test results and keep a list of the medicines you take. How can you care for yourself at home? · Ask your family, friends, and coworkers for support. You have a better chance of quitting if you have help and support. · Join a support group, such as Nicotine Anonymous, for people who are trying to quit smoking. · Consider signing up for a smoking cessation program, such as the American Lung Association's Freedom from Smoking program. 
· Get text messaging support. Go to the website at www.smokefree. gov to sign up for the [facility] program. 
· Set a quit date. Pick your date carefully so that it is not right in the middle of a big deadline or stressful time. Once you quit, do not even take a puff. Get rid of all ashtrays and lighters after your last cigarette. Clean your house and your clothes so that they do not smell of smoke. · Learn how to be a nonsmoker. Think about ways you can avoid those things that make you reach for a cigarette. ? Avoid situations that put you at greatest risk for smoking. For some people, it is hard to have a drink with friends without smoking. For others, they might skip a coffee break with coworkers who smoke. ? Change your daily routine. Take a different route to work or eat a meal in a different place. · Cut down on stress. Calm yourself or release tension by doing an activity you enjoy, such as reading a book, taking a hot bath, or gardening. · Talk to your doctor or pharmacist about nicotine replacement therapy, which replaces the nicotine in your body. You still get nicotine but you do not use tobacco. Nicotine replacement products help you slowly reduce the amount of nicotine you need. These products come in several forms, many of them available over-the-counter: ? Nicotine patches ? Nicotine gum and lozenges ? Nicotine inhaler · Ask your doctor about bupropion (Wellbutrin) or varenicline (Chantix), which are prescription medicines. They do not contain nicotine. They help you by reducing withdrawal symptoms, such as stress and anxiety. · Some people find hypnosis, acupuncture, and massage helpful for ending the smoking habit. · Eat a healthy diet and get regular exercise. Having healthy habits will help your body move past its craving for nicotine. · Be prepared to keep trying. Most people are not successful the first few times they try to quit. Do not get mad at yourself if you smoke again. Make a list of things you learned and think about when you want to try again, such as next week, next month, or next year. Where can you learn more? Go to http://www.gray.com/ Enter T912 in the search box to learn more about \"Stopping Smoking: Care Instructions. \" Current as of: March 12, 2020               Content Version: 12.6 © 4561-9274 [employer]. Care instructions adapted under license by [employer] (which disclaims liability or warranty for this information). If you have questions about a medical condition or this instruction, always ask your healthcare professional. [place] [address] any warranty or liability for your use of this information.

## 2020-12-11 NOTE — LETTER
12/11/20 Patient: Desirae Middleton YOB: 1980 Date of Visit: 12/11/2020 Poppy Koroma NP 
[address] [facility] [address] VIA In Basket Dear Poppy Koroma NP, Thank you for referring Ms. Anika Vásquez to  ORTHOPAEDICS for evaluation. My notes for this consultation are attached. If you have questions, please do not hesitate to call me. I look forward to following your patient along with you.  
 
 
Sincerely, 
 
Carrie Clarke MD

## 2020-12-11 NOTE — PROGRESS NOTES
[address]  [address], [address]  [place], [address] [place] [address] Street  Phone: [phone]  Fax: [phone]  INITIAL CONSULTATION  Patient: Adriana Dodson                MRN: [id number]       SSN: [id number]  YOB: 1980        AGE: 41 y.o. SEX: female  Body mass index is 27.94 kg/m². PCP: Billy Anderson NP  12/11/20    Chief Complaint   Patient presents with    Back Pain     SC         HISTORY OF PRESENT ILLNESS, RADIOGRAPHS, and PLAN:       Mr. Dr. Cherie Smith and Dr. Clive Ordonez. Patient is a 41-year-old female history of diabetes thyroid disease hypertension she has a family history of arthritis fibromyalgia. She had years of pain she gets thoracic pain lumbar pain and buttock and hip pain. She gets occasional pain in her right leg but also has had it in her left leg. Is axial more than radicular pain. Denies bowel bladder dysfunction. She has had numerous injections and medications without long-lasting relief. She is currently in pain management. Her physical exam demonstrates normal strength sensation reflexes no nerve tension signs. She has limited range of motion of her lumbar spine. She does not have any peripheral joints that are painful almost everything is axial.  MRI of her lumbar spine demonstrates degenerative changes at 4551 with a small right paracentral disc protrusion. There is no instability. I discussed the matter at length with her I see nothing surgical she is in tension signs she has no neurologic deficits she does have a lateralized disc protrusion at 5 1 which may play a role in some of her right leg symptoms but it is a minority. I discussed the matter at length with her I would get some blood work accomplished to see if there is any evidence of an autoimmune inflammatory syndrome. Further conservative care is reasonable further pain management is reasonable I see no evidence of any surgical pathology.     This dictation was created utilizing voice recognition software. Errors may be present. Past Medical History:   Diagnosis Date    Back pain     Cervical paraspinal muscle spasm     Cervical radiculopathy     Chronic hip pain     Depression     Diabetes (HCC)     History of asthma     History of bronchitis     Hypertension     Hypothyroid     Prediabetes     Sciatica     Scoliosis     Sickle cell trait ([address]        Family History   Problem Relation Age of Onset    Asthma Mother     Tuberculosis Mother     Alcohol abuse Mother     Drug Abuse Mother     Diabetes Father     Asthma Father     Heart Disease Father     Sickle Cell Anemia Sister     Depression Brother     Lung Cancer Maternal Grandmother     Hypertension Maternal Grandmother     Colon Cancer Paternal Grandmother     Emphysema Paternal Grandmother     Depression Brother     Depression Brother     Depression Brother        Current Outpatient Medications   Medication Sig Dispense Refill    glipiZIDE (GLUCOTROL) 5 mg tablet Take 0.5 Tabs by mouth two (2) times daily (with meals). 30 Tab 1    metFORMIN ER (GLUCOPHAGE XR) 750 mg tablet Take 1 Tab by mouth daily (with dinner). 30 Tab 1    levothyroxine (SYNTHROID) 175 mcg tablet Take 1 Tab by mouth Daily (before breakfast). 90 Tab 1    Blood-Glucose Meter (True Metrix Air Glucose Meter) monitoring kit Check glucose three times daily Dx Code E11.65 1 Kit 0    glucose blood VI test strips (True Metrix Glucose Test Strip) strip Check glucose three times a day DX Code E11.65 300 Strip 1    lancets (True Comfort Lancet) 30 gauge misc Check glucose three times a day  DX Code E11.65 300 Lancet 1    cyclobenzaprine (FLEXERIL) 10 mg tablet Take 1 Tab by mouth three (3) times daily as needed for Muscle Spasm(s).  90 Tab 3    lisinopril-hydroCHLOROthiazide (PRINZIDE, ZESTORETIC) 20-12.5 mg per tablet TAKE 1 TABLET BY MOUTH DAILY 90 Tab 1    HYDROcodone-acetaminophen (NORCO) 5-325 mg per tablet TK 1 T PO BID PRN      cetirizine (ZYRTEC) 10 mg tablet Take 10 mg by mouth daily.  atorvastatin (LIPITOR) 40 mg tablet Take 1 Tab by mouth nightly. 90 Tab 0    topiramate (TOPAMAX) 25 mg tablet Take 1 Tab by mouth two (2) times a day. (Patient not taking: Reported on 9/10/2020) 60 Tab 2    naproxen sodium (ALEVE) 220 mg tablet Take 220 mg by mouth two (2) times daily (with meals).  docusate sodium (COLACE) 100 mg capsule Take 100 mg by mouth.          Allergies   Allergen Reactions    Tramadol Nausea and Vomiting       Past Surgical History:   Procedure Laterality Date    HX CARPAL TUNNEL RELEASE Right     HX HYSTERECTOMY      HX ORTHOPAEDIC      cyst removed right wrist    HX THYROIDECTOMY         Past Medical History:   Diagnosis Date    Back pain     Cervical paraspinal muscle spasm     Cervical radiculopathy     Chronic hip pain     Depression     Diabetes (HCC)     History of asthma     History of bronchitis     Hypertension     Hypothyroid     Prediabetes     Sciatica     Scoliosis     Sickle cell trait (HCC)        Social History     Socioeconomic History    Marital status:      Spouse name: Not on file    Number of children: Not on file    Years of education: Not on file    Highest education level: Not on file   Occupational History    Not on file   Social Needs    Financial resource strain: Not on file    Food insecurity     Worry: Not on file     Inability: Not on file    Transportation needs     Medical: Not on file     Non-medical: Not on file   Tobacco Use    Smoking status: Current Every Day Smoker     Packs/day: 0.50    Smokeless tobacco: Never Used   Substance and Sexual Activity    Alcohol use: No    Drug use: No    Sexual activity: Not on file   Lifestyle    Physical activity     Days per week: Not on file     Minutes per session: Not on file    Stress: Not on file   Relationships    Social connections     Talks on phone: Not on file Gets together: Not on file     Attends [employer] service: Not on file     Active member of club or organization: Not on file     Attends meetings of clubs or organizations: Not on file     Relationship status: Not on file    Intimate partner violence     Fear of current or ex partner: Not on file     Emotionally abused: Not on file     Physically abused: Not on file     Forced sexual activity: Not on file   Other Topics Concern    Not on file   Social History Narrative    Not on file       REVIEW OF SYSTEMS:   CONSTITUTIONAL SYMPTOMS:  Negative. EYES:  Negative. EARS, NOSE, THROAT AND MOUTH:  Negative. CARDIOVASCULAR:  Negative. RESPIRATORY:  Negative. GENITOURINARY: Per HPI. GASTROINTESTINAL:  Per HPI. INTEGUMENTARY (SKIN AND/OR BREAST):  Negative. MUSCULOSKELETAL: Per HPI.   ENDOCRINE/RHEUMATOLOGIC:  Negative. NEUROLOGICAL:  Per HPI. HEMATOLOGIC/LYMPHATIC:  Negative. ALLERGIC/IMMUNOLOGIC:  Negative. PSYCHIATRIC:  Negative. PHYSICAL EXAMINATION:   Visit Vitals  BP (!) 142/94 (BP 1 Location: Right arm, BP Patient Position: Sitting)   Pulse 96   Temp 97.6 °F (36.4 °C) (Temporal)   Resp 16   Wt 211 lb 12.8 oz (96.1 kg)   BMI 27.94 kg/m²    PAIN SCALE: 7/10    CONSTITUTIONAL: The patient is in no apparent distress and is alert and oriented x 3. HEENT: Normocephalic. Hearing grossly intact. NECK: Supple and symmetric. no tenderness, or masses were felt. RESPIRATORY: No labored breathing. CARDIOVASCULAR: The carotid pulses were normal. Peripheral pulses were 2+. CHEST: Normal AP diameter and normal contour without any kyphoscoliosis. LYMPHATIC: No lymphadenopathy was appreciated in the neck, axillae or groin. SKIN:  Negative for scars, rashes, lesions, or ulcers on the right upper, right lower, left upper, left lower and trunk. NEUROLOGICAL: Alert and oriented x 3. Ambulation without assistive device. FWB.   EXTREMITIES:  See musculoskeletal.  MUSCULOSKELETAL:   Head and Neck: Negative for misalignment, asymmetry, crepitation, defects, tenderness masses or effusions.  Left Upper Extremity: Inspection, percussion and palpation performed. Miltons sign is negative.  Right Upper Extremity: Shoulder pain. Inspection, percussion and palpation performed. Miltons sign is negative.  Spine, Ribs and Pelvis: Upper, mid, and lower back pain. R hip pain. Inspection, percussion and palpation performed. Negative for misalignment, asymmetry, crepitation, defects, tenderness masses or effusions.  Left Lower Extremity: Inspection, percussion and palpation performed. Negative straight leg raise.  Right Lower Extremity: Inspection, percussion and palpation performed. Negative straight leg raise. SPINE EXAM:     Cervical spine: Neck is midline. Normal muscle tone. No focal atrophy is noted. Lumbar spine: No rash, ecchymosis, or gross obliquity. No focal atrophy is noted. ASSESSMENT    ICD-10-CM ICD-9-CM    1. Lumbosacral spondylosis without myelopathy  M47.817 721.3    2. Lumbar neuritis  M54.16 724.4    3. FHx: arthritis  Z82.61 V17.7 C REACTIVE PROTEIN, QT      SED RATE (ESR)      RHEUMATOID FACTOR AB, IGM, IGA, IGG      ANTINUCLEAR ANTIBODIES, IFA   4. Inflammatory arthropathy  M19.90 714.9 C REACTIVE PROTEIN, QT      SED RATE (ESR)      RHEUMATOID FACTOR AB, IGM, IGA, IGG      ANTINUCLEAR ANTIBODIES, IFA       Written by Richardson Che, as dictated by Abi Dodson MD.    I, Dr. Abi Dodson MD, confirm that all documentation is accurate.

## 2020-12-14 RX ORDER — GLIPIZIDE 5 MG/1
2.5 TABLET ORAL 2 TIMES DAILY WITH MEALS
Qty: 90 TAB | Refills: 0 | Status: SHIPPED | OUTPATIENT
Start: 2020-12-14 | End: 2021-08-20 | Stop reason: ALTCHOICE

## 2020-12-20 LAB
ANA TITR SER IF: NEGATIVE {TITER}
CRP SERPL-MCNC: 8 MG/L (ref 0–10)
ERYTHROCYTE [SEDIMENTATION RATE] IN BLOOD BY WESTERGREN METHOD: 8 MM/HR (ref 0–32)
RHEUMATOID FACT SERPL-ACNC: <10 IU/ML (ref 0–13.9)

## 2021-01-21 RX ORDER — METFORMIN HYDROCHLORIDE 750 MG/1
750 TABLET, EXTENDED RELEASE ORAL
Qty: 90 TAB | Refills: 0 | Status: SHIPPED | OUTPATIENT
Start: 2021-01-21 | End: 2021-08-17 | Stop reason: SDUPTHER

## 2021-01-21 NOTE — TELEPHONE ENCOUNTER
Patient is requesting a 90 day supply    Last Visit: 11/19/20 with VITALY Chino  Next Appointment: Advised to follow-up in 4 weeks    Requested Prescriptions     Pending Prescriptions Disp Refills    metFORMIN ER (GLUCOPHAGE XR) 750 mg tablet 90 Tab 0     Sig: Take 1 Tab by mouth daily (with dinner).

## 2021-02-16 DIAGNOSIS — M19.90 INFLAMMATORY ARTHROPATHY: ICD-10-CM

## 2021-02-16 DIAGNOSIS — Z82.61: ICD-10-CM

## 2021-03-24 ENCOUNTER — VIRTUAL VISIT (OUTPATIENT)
Dept: FAMILY MEDICINE CLINIC | Age: 41
End: 2021-03-24
Payer: MEDICARE

## 2021-03-24 DIAGNOSIS — I10 ESSENTIAL HYPERTENSION: ICD-10-CM

## 2021-03-24 DIAGNOSIS — E03.2 HYPOTHYROIDISM DUE TO NON-MEDICATION EXOGENOUS SUBSTANCES: ICD-10-CM

## 2021-03-24 DIAGNOSIS — Z11.3 ROUTINE SCREENING FOR STI (SEXUALLY TRANSMITTED INFECTION): ICD-10-CM

## 2021-03-24 DIAGNOSIS — Z00.00 MEDICARE ANNUAL WELLNESS VISIT, SUBSEQUENT: ICD-10-CM

## 2021-03-24 DIAGNOSIS — M46.1 SACROILIITIS (HCC): ICD-10-CM

## 2021-03-24 DIAGNOSIS — E11.65 TYPE 2 DIABETES MELLITUS WITH HYPERGLYCEMIA, WITHOUT LONG-TERM CURRENT USE OF INSULIN (HCC): Primary | ICD-10-CM

## 2021-03-24 PROCEDURE — G8419 CALC BMI OUT NRM PARAM NOF/U: HCPCS | Performed by: NURSE PRACTITIONER

## 2021-03-24 PROCEDURE — G9717 DOC PT DX DEP/BP F/U NT REQ: HCPCS | Performed by: NURSE PRACTITIONER

## 2021-03-24 PROCEDURE — 3046F HEMOGLOBIN A1C LEVEL >9.0%: CPT | Performed by: NURSE PRACTITIONER

## 2021-03-24 PROCEDURE — 99214 OFFICE O/P EST MOD 30 MIN: CPT | Performed by: NURSE PRACTITIONER

## 2021-03-24 PROCEDURE — G8427 DOCREV CUR MEDS BY ELIG CLIN: HCPCS | Performed by: NURSE PRACTITIONER

## 2021-03-24 PROCEDURE — 2022F DILAT RTA XM EVC RTNOPTHY: CPT | Performed by: NURSE PRACTITIONER

## 2021-03-24 PROCEDURE — G8756 NO BP MEASURE DOC: HCPCS | Performed by: NURSE PRACTITIONER

## 2021-03-24 PROCEDURE — G0439 PPPS, SUBSEQ VISIT: HCPCS | Performed by: NURSE PRACTITIONER

## 2021-03-24 RX ORDER — GABAPENTIN 300 MG/1
300 CAPSULE ORAL DAILY
COMMUNITY
Start: 2021-03-15

## 2021-03-24 NOTE — PROGRESS NOTES
Cassandra Mathew is a 41 y.o. female who was seen by synchronous (real-time) audio-video technology on 3/24/2021 for Hypertension and Diabetes    Assessment & Plan:   Diagnoses and all orders for this visit:    1. Type 2 diabetes mellitus with hyperglycemia, without long-term current use of insulin (HCC)  -     HEMOGLOBIN A1C WITH EAG; Future  -     MICROALBUMIN, UR, RAND W/ MICROALB/CREAT RATIO; Future    2. Sacroiliitis [facility])  Assessment & Plan: This condition is managed by Specialist.      3. Essential hypertension  -     LIPID PANEL; Future  -     METABOLIC PANEL, COMPREHENSIVE; Future    4. Hypothyroidism due to non-medication exogenous substances  -     TSH 3RD GENERATION; Future  -     T4, FREE; Future    5. Routine screening for STI (sexually transmitted infection)  -     CT/NG/T.VAGINALIS AMPLIFICATION; Future  -     URINALYSIS W/MICROSCOPIC; Future    6. Medicare annual wellness visit, subsequent    Follow-up and Dispositions    · Return for WWE with PAP/CBE, in office follow up. I spent at least 20 minutes on this visit with this established patient. 712  Subjective:   Patient states her  was dx with a UTI and the provider also gave a prescription for her to take a well. Comments she didn't take medication due to not being able to speak with her provider. Hypertension ROS: taking medications as instructed, no medication side effects noted, home BP monitoring in range of 154-963'N systolic over 34-15'D diastolic, no TIA's, no chest pain on exertion, no dyspnea on exertion, no swelling of ankles. 194/98-today has not taken medication today. Diabetic Review of Systems - medication compliance: compliant all of the time, diet compliance: compliant most of the time, home glucose monitoring: is performed sporadically, further ROS: no polyuria or polydipsia, no chest pain, dyspnea or TIA's, no numbness, tingling or pain in extremities.       Patient states she has been having increase stress with having to move out of her current house by 3/31/2021. Comments this has also caused her to have increased back pain. States her pain management provider advised her she may taking otc ibuprofen 600mg between her Norco doses. States her  began [employer] for gambling problem which she attends with him. Patient also reports she has not had a PAP in approx 4 years. Advised to schedule wellness exam with PAP. Prior to Admission medications    Medication Sig Start Date End Date Taking? Authorizing Provider   metFORMIN ER (GLUCOPHAGE XR) 750 mg tablet Take 1 Tab by mouth daily (with dinner). 1/21/21   Britton PARKER NP   glipiZIDE (GLUCOTROL) 5 mg tablet Take 0.5 Tabs by mouth two (2) times daily (with meals). 12/14/20   Britton PARKER NP   levothyroxine (SYNTHROID) 175 mcg tablet Take 1 Tab by mouth Daily (before breakfast). 11/19/20   Britton PARKER NP   Blood-Glucose Meter (True Metrix Air Glucose Meter) monitoring kit Check glucose three times daily Dx Code E11.65 11/2/20   Britton PARKER NP   glucose blood VI test strips (True Metrix Glucose Test Strip) strip Check glucose three times a day DX Code E11.65 11/2/20   Britton PARKER NP   lancets (True Comfort Lancet) 30 gauge misc Check glucose three times a day  DX Code E11.65 11/2/20   Britton PARKER NP   cyclobenzaprine (FLEXERIL) 10 mg tablet Take 1 Tab by mouth three (3) times daily as needed for Muscle Spasm(s). 10/9/20   Britton PARKER NP   lisinopril-hydroCHLOROthiazide (PRINZIDE, ZESTORETIC) 20-12.5 mg per tablet TAKE 1 TABLET BY MOUTH DAILY 10/4/20   Britton PARKER NP   HYDROcodone-acetaminophen [facility]) 5-325 mg per tablet TK 1 T PO BID PRN 8/26/20   Provider, Historical   cetirizine (ZYRTEC) 10 mg tablet Take 10 mg by mouth daily. 7/15/20   Provider, Historical   atorvastatin (LIPITOR) 40 mg tablet Take 1 Tab by mouth nightly.  7/6/20   Britton PARKER NP   topiramate (TOPAMAX) 25 mg tablet Take 1 Tab by mouth two (2) times a day. Patient not taking: Reported on 9/10/2020 3/6/20   Isaura Arana MD   naproxen sodium (ALEVE) 220 mg tablet Take 220 mg by mouth two (2) times daily (with meals). Provider, Historical   docusate sodium (COLACE) 100 mg capsule Take 100 mg by mouth. 9/25/19   Provider, Historical     Patient Active Problem List   Diagnosis Code    Hypertension I5    Hypothyroid E03.9    Hyperglycemia due to type 2 diabetes mellitus ([facility] E11.65    Nicotine dependence, cigarettes, uncomplicated Y16.805    Depression F32.9    DDD (degenerative disc disease), lumbar M51.36    Fatigue R53.83    Urgency of micturition R39.15    Encounter for long-term (current) use of medications Z79.899    Hypovitaminosis D E55.9    Prediabetes R73.03     Patient Active Problem List    Diagnosis Date Noted    Prediabetes     Hypertension 06/04/2018    Hypothyroid 06/04/2018    Hyperglycemia due to type 2 diabetes mellitus ([facility] 06/04/2018    Nicotine dependence, cigarettes, uncomplicated 47/45/7276    Depression 06/04/2018    DDD (degenerative disc disease), lumbar 06/04/2018    Fatigue 06/04/2018    Urgency of micturition 06/04/2018    Encounter for long-term (current) use of medications 06/04/2018    Hypovitaminosis D 06/04/2018     Current Outpatient Medications   Medication Sig Dispense Refill    gabapentin (NEURONTIN) 300 mg capsule Take 300 mg by mouth three (3) times daily.  metFORMIN ER (GLUCOPHAGE XR) 750 mg tablet Take 1 Tab by mouth daily (with dinner). 90 Tab 0    glipiZIDE (GLUCOTROL) 5 mg tablet Take 0.5 Tabs by mouth two (2) times daily (with meals). 90 Tab 0    levothyroxine (SYNTHROID) 175 mcg tablet Take 1 Tab by mouth Daily (before breakfast).  90 Tab 1    Blood-Glucose Meter (True Metrix Air Glucose Meter) monitoring kit Check glucose three times daily Dx Code E11.65 1 Kit 0    glucose blood VI test strips (True Metrix Glucose Test Strip) strip Check glucose three times a day DX Code E11.65 300 Strip 1    lancets (True Comfort Lancet) 30 gauge misc Check glucose three times a day  DX Code E11.65 300 Lancet 1    cyclobenzaprine (FLEXERIL) 10 mg tablet Take 1 Tab by mouth three (3) times daily as needed for Muscle Spasm(s). 90 Tab 3    lisinopril-hydroCHLOROthiazide (PRINZIDE, ZESTORETIC) 20-12.5 mg per tablet TAKE 1 TABLET BY MOUTH DAILY 90 Tab 1    HYDROcodone-acetaminophen (NORCO) 5-325 mg per tablet TK 1 T PO BID PRN      atorvastatin (LIPITOR) 40 mg tablet Take 1 Tab by mouth nightly. 90 Tab 0    topiramate (TOPAMAX) 25 mg tablet Take 1 Tab by mouth two (2) times a day. 60 Tab 2    docusate sodium (COLACE) 100 mg capsule Take 100 mg by mouth. Allergies   Allergen Reactions    Tramadol Nausea and Vomiting     Past Medical History:   Diagnosis Date    Back pain     Cervical paraspinal muscle spasm     Cervical radiculopathy     Chronic hip pain     Depression     Diabetes (HCC)     History of asthma     History of bronchitis     Hypertension     Hypothyroid     Prediabetes     Sciatica     Scoliosis     Sickle cell trait ([facility]      Past Surgical History:   Procedure Laterality Date    HX CARPAL TUNNEL RELEASE Right     HX HYSTERECTOMY      HX ORTHOPAEDIC      cyst removed right wrist    HX THYROIDECTOMY       Family History   Problem Relation Age of Onset    Asthma Mother     Tuberculosis Mother     Alcohol abuse Mother     Drug Abuse Mother     Diabetes Father     Asthma Father     Heart Disease Father     Sickle Cell Anemia Sister     Depression Brother     Lung Cancer Maternal Grandmother     Hypertension Maternal Grandmother     Colon Cancer Paternal Grandmother     Emphysema Paternal Grandmother     Depression Brother     Depression Brother     Depression Brother      Social History     Tobacco Use    Smoking status: Current Every Day Smoker     Packs/day: 0.50    Smokeless tobacco: Never Used   Substance Use Topics    Alcohol use:  No ROS    Objective:     Patient-Reported Vitals 12/3/2020   Patient-Reported Weight 213   Patient-Reported Height 6'1\"   Patient-Reported Pulse -   Patient-Reported Temperature -   Patient-Reported Systolic  -   Patient-Reported Diastolic -      General: alert, cooperative, no distress   Mental  status: normal mood, behavior, speech, dress, motor activity, and thought processes, able to follow commands   HENT: NCAT   Neck: no visualized mass   Resp: no respiratory distress   Neuro: no gross deficits   Skin: no discoloration or lesions of concern on visible areas   Psychiatric: normal affect, consistent with stated mood, no evidence of hallucinations     Additional exam findings: We discussed the expected course, resolution and complications of the diagnosis(es) in detail. Medication risks, benefits, costs, interactions, and alternatives were discussed as indicated. I advised her to contact the office if her condition worsens, changes or fails to improve as anticipated. She expressed understanding with the diagnosis(es) and plan. Nasir Jesús, was evaluated through a synchronous (real-time) audio-video encounter. The patient (or guardian if applicable) is aware that this is a billable service. Verbal consent to proceed has been obtained within the past 12 months. The visit was conducted pursuant to the emergency declaration under the [facility], [address] [place] [address] authority and the [employer] Act. Patient identification was verified, and a caregiver was present when appropriate. The patient was located in a state where the provider was credentialed to provide care.     Shoaib June NP    This is the Subsequent Medicare Annual Wellness Exam, performed 12 months or more after the Initial AWV or the last Subsequent AWV    I have reviewed the patient's medical history in detail and updated the computerized patient record. Depression Risk Factor Screening:     3 most recent PHQ Screens 3/24/2021   PHQ Not Done -   Little interest or pleasure in doing things Not at all   Feeling down, depressed, irritable, or hopeless Not at all   Total Score PHQ 2 0   Trouble falling or staying asleep, or sleeping too much Not at all   Feeling tired or having little energy Not at all   Poor appetite, weight loss, or overeating Not at all   Feeling bad about yourself - or that you are a failure or have let yourself or your family down Not at all   Trouble concentrating on things such as school, work, reading, or watching TV Not at all   Moving or speaking so slowly that other people could have noticed; or the opposite being so fidgety that others notice Not at all   Thoughts of being better off dead, or hurting yourself in some way Not at all   PHQ 9 Score 0   How difficult have these problems made it for you to do your work, take care of your home and get along with others Not difficult at all       Alcohol Risk Screen    Do you average more than 1 drink per night or more than 7 drinks a week:  No    On any one occasion in the past three months have you have had more than 3 drinks containing alcohol:  No        Functional Ability and Level of Safety:    Hearing: Hearing is good. Activities of Daily Living: The home contains: no safety equipment. Patient does total self care      Ambulation: with no difficulty     Fall Risk:  Fall Risk Assessment, last 12 mths 1/15/2020   Able to walk? Yes   Fall in past 12 months? Yes   Number of falls in past 12 months 2   Fall with injury? 1      Abuse Screen:  Patient is not abused       Cognitive Screening    Has your family/caregiver stated any concerns about your memory: no    Assessment/Plan   Education and counseling provided:  Are appropriate based on today's review and evaluation    Diagnoses and all orders for this visit:    1.  Type 2 diabetes mellitus with hyperglycemia, without long-term current use of insulin (HCC)  -     HEMOGLOBIN A1C WITH EAG; Future  -     MICROALBUMIN, UR, RAND W/ MICROALB/CREAT RATIO; Future    2. Sacroiliitis [facility])  Assessment & Plan: This condition is managed by Specialist.      3. Essential hypertension  -     LIPID PANEL; Future  -     METABOLIC PANEL, COMPREHENSIVE; Future    4. Hypothyroidism due to non-medication exogenous substances  -     TSH 3RD GENERATION; Future  -     T4, FREE; Future    5. Routine screening for STI (sexually transmitted infection)  -     CT/NG/T.VAGINALIS AMPLIFICATION; Future  -     URINALYSIS W/MICROSCOPIC; Future    6.  Medicare annual wellness visit, subsequent        Health Maintenance Due     Health Maintenance Due   Topic Date Due    Hepatitis C Screening  Never done    Pneumococcal 0-64 years (1 of 1 - PPSV23) Never done    Foot Exam Q1  Never done    Eye Exam Retinal or Dilated  Never done    COVID-19 Vaccine (1) Never done    DTaP/Tdap/Td series (1 - Tdap) Never done    PAP AKA CERVICAL CYTOLOGY  Never done    MICROALBUMIN Q1  06/08/2019    Medicare Yearly Exam  09/05/2020       Patient Care Team   Patient Care Team:  Violet Calabrese NP as PCP - General (Nurse Practitioner)  Violet Calabrese NP as PCP - [facility] Empaneled Provider  Reuben Liu MD as Consulting Provider (Neurology)  Adolph Nunez MD (Endocrinology)    History     Patient Active Problem List   Diagnosis Code    Hypertension I5    Hypothyroid E03.9    Hyperglycemia due to type 2 diabetes mellitus ([facility] E11.65    Nicotine dependence, cigarettes, uncomplicated N24.915    Depression F32.9    DDD (degenerative disc disease), lumbar M51.36    Fatigue R53.83    Urgency of micturition R39.15    Encounter for long-term (current) use of medications Z79.899    Hypovitaminosis D E55.9    Prediabetes R73.03    Sacroiliitis ([facility] M46.1     Past Medical History:   Diagnosis Date    Back pain     Cervical paraspinal muscle spasm     Cervical radiculopathy     Chronic hip pain     Depression     Diabetes (HCC)     History of asthma     History of bronchitis     Hypertension     Hypothyroid     Prediabetes     Sciatica     Scoliosis     Sickle cell trait (HCC)       Past Surgical History:   Procedure Laterality Date    HX CARPAL TUNNEL RELEASE Right     HX HYSTERECTOMY      HX ORTHOPAEDIC      cyst removed right wrist    HX THYROIDECTOMY       Current Outpatient Medications   Medication Sig Dispense Refill    gabapentin (NEURONTIN) 300 mg capsule Take 300 mg by mouth three (3) times daily.  metFORMIN ER (GLUCOPHAGE XR) 750 mg tablet Take 1 Tab by mouth daily (with dinner). 90 Tab 0    glipiZIDE (GLUCOTROL) 5 mg tablet Take 0.5 Tabs by mouth two (2) times daily (with meals). 90 Tab 0    levothyroxine (SYNTHROID) 175 mcg tablet Take 1 Tab by mouth Daily (before breakfast). 90 Tab 1    Blood-Glucose Meter (True Metrix Air Glucose Meter) monitoring kit Check glucose three times daily Dx Code E11.65 1 Kit 0    glucose blood VI test strips (True Metrix Glucose Test Strip) strip Check glucose three times a day DX Code E11.65 300 Strip 1    lancets (True Comfort Lancet) 30 gauge misc Check glucose three times a day  DX Code E11.65 300 Lancet 1    cyclobenzaprine (FLEXERIL) 10 mg tablet Take 1 Tab by mouth three (3) times daily as needed for Muscle Spasm(s). 90 Tab 3    lisinopril-hydroCHLOROthiazide (PRINZIDE, ZESTORETIC) 20-12.5 mg per tablet TAKE 1 TABLET BY MOUTH DAILY 90 Tab 1    HYDROcodone-acetaminophen (NORCO) 5-325 mg per tablet TK 1 T PO BID PRN      atorvastatin (LIPITOR) 40 mg tablet Take 1 Tab by mouth nightly. 90 Tab 0    topiramate (TOPAMAX) 25 mg tablet Take 1 Tab by mouth two (2) times a day. 60 Tab 2    docusate sodium (COLACE) 100 mg capsule Take 100 mg by mouth.        Allergies   Allergen Reactions    Tramadol Nausea and Vomiting       Family History   Problem Relation Age of Onset    Asthma Mother    [facility] Tuberculosis Mother     Alcohol abuse Mother     Drug Abuse Mother     Diabetes Father     Asthma Father     Heart Disease Father     Sickle Cell Anemia Sister     Depression Brother     Lung Cancer Maternal Grandmother     Hypertension Maternal Grandmother     Colon Cancer Paternal Grandmother     Emphysema Paternal Grandmother     Depression Brother     Depression Brother     Depression Brother      Social History     Tobacco Use    Smoking status: Current Every Day Smoker     Packs/day: 0.50    Smokeless tobacco: Never Used   Substance Use Topics    Alcohol use: No       Cassandra Mathew, who was evaluated through a synchronous (real-time) audio-video encounter, and/or her healthcare decision maker, is aware that it is a billable service, with coverage as determined by her insurance carrier. She provided verbal consent to proceed: Yes, and patient identification was verified. It was conducted pursuant to the emergency declaration under the [address] [place] [address], [address] [place] [address] authority and the [employer] Act. A caregiver was present when appropriate. Ability to conduct physical exam was limited. I was in the office. The patient was at home.     Karo Snow NP

## 2021-04-14 ENCOUNTER — APPOINTMENT (OUTPATIENT)
Dept: FAMILY MEDICINE CLINIC | Age: 41
End: 2021-04-14

## 2021-04-14 DIAGNOSIS — I10 ESSENTIAL HYPERTENSION: ICD-10-CM

## 2021-04-14 DIAGNOSIS — E11.65 TYPE 2 DIABETES MELLITUS WITH HYPERGLYCEMIA, WITHOUT LONG-TERM CURRENT USE OF INSULIN (HCC): ICD-10-CM

## 2021-04-14 DIAGNOSIS — Z11.3 ROUTINE SCREENING FOR STI (SEXUALLY TRANSMITTED INFECTION): ICD-10-CM

## 2021-04-14 DIAGNOSIS — E03.2 HYPOTHYROIDISM DUE TO NON-MEDICATION EXOGENOUS SUBSTANCES: ICD-10-CM

## 2021-04-19 DIAGNOSIS — M79.10 MYALGIA: ICD-10-CM

## 2021-04-19 LAB
ALBUMIN SERPL-MCNC: 4.3 G/DL (ref 3.8–4.8)
ALBUMIN/CREAT UR: 4 MG/G CREAT (ref 0–29)
ALBUMIN/GLOB SERPL: 1.6 {RATIO} (ref 1.2–2.2)
ALP SERPL-CCNC: 75 IU/L (ref 39–117)
ALT SERPL-CCNC: 10 IU/L (ref 0–32)
APPEARANCE UR: ABNORMAL
AST SERPL-CCNC: 12 IU/L (ref 0–40)
BACTERIA #/AREA URNS HPF: ABNORMAL /[HPF]
BILIRUB SERPL-MCNC: 0.7 MG/DL (ref 0–1.2)
BILIRUB UR QL STRIP: NEGATIVE
BUN SERPL-MCNC: 10 MG/DL (ref 6–24)
BUN/CREAT SERPL: 10 (ref 9–23)
C TRACH RRNA SPEC QL NAA+PROBE: NEGATIVE
CALCIUM SERPL-MCNC: 9.7 MG/DL (ref 8.7–10.2)
CASTS URNS QL MICRO: ABNORMAL /LPF
CHLORIDE SERPL-SCNC: 102 MMOL/L (ref 96–106)
CHOLEST SERPL-MCNC: 247 MG/DL (ref 100–199)
CO2 SERPL-SCNC: 21 MMOL/L (ref 20–29)
COLOR UR: YELLOW
CREAT SERPL-MCNC: 0.97 MG/DL (ref 0.57–1)
CREAT UR-MCNC: 200.3 MG/DL
EPI CELLS #/AREA URNS HPF: ABNORMAL /HPF (ref 0–10)
EST. AVERAGE GLUCOSE BLD GHB EST-MCNC: 223 MG/DL
GLOBULIN SER CALC-MCNC: 2.7 G/DL (ref 1.5–4.5)
GLUCOSE SERPL-MCNC: 213 MG/DL (ref 65–99)
GLUCOSE UR QL: NEGATIVE
HBA1C MFR BLD: 9.4 % (ref 4.8–5.6)
HDLC SERPL-MCNC: 36 MG/DL
HGB UR QL STRIP: NEGATIVE
KETONES UR QL STRIP: ABNORMAL
LDLC SERPL CALC-MCNC: 178 MG/DL (ref 0–99)
LEUKOCYTE ESTERASE UR QL STRIP: NEGATIVE
MICRO URNS: ABNORMAL
MICRO URNS: ABNORMAL
MICROALBUMIN UR-MCNC: 8.8 UG/ML
N GONORRHOEA RRNA SPEC QL NAA+PROBE: NEGATIVE
NITRITE UR QL STRIP: NEGATIVE
PH UR STRIP: 6.5 [PH] (ref 5–7.5)
POTASSIUM SERPL-SCNC: 4.2 MMOL/L (ref 3.5–5.2)
PROT SERPL-MCNC: 7 G/DL (ref 6–8.5)
PROT UR QL STRIP: NEGATIVE
RBC #/AREA URNS HPF: ABNORMAL /HPF (ref 0–2)
SODIUM SERPL-SCNC: 137 MMOL/L (ref 134–144)
SP GR UR: 1.02 (ref 1–1.03)
T VAGINALIS DNA SPEC QL NAA+PROBE: NEGATIVE
T4 FREE SERPL-MCNC: 1.61 NG/DL (ref 0.82–1.77)
TRIGL SERPL-MCNC: 177 MG/DL (ref 0–149)
TSH SERPL DL<=0.005 MIU/L-ACNC: 0.07 UIU/ML (ref 0.45–4.5)
UROBILINOGEN UR STRIP-MCNC: 1 MG/DL (ref 0.2–1)
VLDLC SERPL CALC-MCNC: 33 MG/DL (ref 5–40)
WBC #/AREA URNS HPF: ABNORMAL /HPF (ref 0–5)

## 2021-04-19 NOTE — TELEPHONE ENCOUNTER
Last Visit: 3/24/21 with VITALY Wick  Next Appointment: none  Previous Refill Encounter(s): 10/9/20 #90 with 3 refills    Requested Prescriptions     Pending Prescriptions Disp Refills    cyclobenzaprine (FLEXERIL) 10 mg tablet 90 Tab 3     Sig: Take 1 Tab by mouth three (3) times daily as needed for Muscle Spasm(s).

## 2021-04-27 RX ORDER — CYCLOBENZAPRINE HCL 10 MG
10 TABLET ORAL
Qty: 90 TAB | Refills: 3 | Status: SHIPPED | OUTPATIENT
Start: 2021-04-27 | End: 2021-09-20 | Stop reason: SDUPTHER

## 2021-04-28 ENCOUNTER — HOSPITAL ENCOUNTER (EMERGENCY)
Age: 41
Discharge: HOME | End: 2021-04-29
Attending: EMERGENCY MEDICINE
Payer: MEDICARE

## 2021-04-28 DIAGNOSIS — A05.9 FOOD POISONING: Primary | ICD-10-CM

## 2021-04-28 LAB
APPEARANCE UR: CLEAR
BILIRUB UR QL: NEGATIVE
COLOR UR: YELLOW
GLUCOSE UR STRIP.AUTO-MCNC: NEGATIVE MG/DL
HGB UR QL STRIP: NEGATIVE
KETONES UR QL STRIP.AUTO: 80 MG/DL
LEUKOCYTE ESTERASE UR QL STRIP.AUTO: NEGATIVE
NITRITE UR QL STRIP.AUTO: NEGATIVE
PH UR STRIP: 5 [PH] (ref 5–8)
PROT UR STRIP-MCNC: NEGATIVE MG/DL
SP GR UR REFRACTOMETRY: 1.02 (ref 1–1.03)
UROBILINOGEN UR QL STRIP.AUTO: 1 EU/DL (ref 0.2–1)

## 2021-04-28 PROCEDURE — 83690 ASSAY OF LIPASE: CPT

## 2021-04-28 PROCEDURE — 81003 URINALYSIS AUTO W/O SCOPE: CPT

## 2021-04-28 PROCEDURE — 80048 BASIC METABOLIC PNL TOTAL CA: CPT

## 2021-04-28 PROCEDURE — 96372 THER/PROPH/DIAG INJ SC/IM: CPT

## 2021-04-28 PROCEDURE — 99283 EMERGENCY DEPT VISIT LOW MDM: CPT

## 2021-04-28 PROCEDURE — 96375 TX/PRO/DX INJ NEW DRUG ADDON: CPT

## 2021-04-28 PROCEDURE — 96374 THER/PROPH/DIAG INJ IV PUSH: CPT

## 2021-04-28 PROCEDURE — 85025 COMPLETE CBC W/AUTO DIFF WBC: CPT

## 2021-04-28 PROCEDURE — 96361 HYDRATE IV INFUSION ADD-ON: CPT

## 2021-04-28 RX ORDER — ONDANSETRON 2 MG/ML
4 INJECTION INTRAMUSCULAR; INTRAVENOUS
Status: COMPLETED | OUTPATIENT
Start: 2021-04-28 | End: 2021-04-29

## 2021-04-29 VITALS
TEMPERATURE: 98.6 F | HEART RATE: 84 BPM | HEIGHT: 72 IN | BODY MASS INDEX: 27.98 KG/M2 | DIASTOLIC BLOOD PRESSURE: 77 MMHG | OXYGEN SATURATION: 98 % | RESPIRATION RATE: 20 BRPM | SYSTOLIC BLOOD PRESSURE: 138 MMHG | WEIGHT: 206.6 LBS

## 2021-04-29 LAB
ANION GAP SERPL CALC-SCNC: 8 MMOL/L (ref 3–18)
BASOPHILS # BLD: 0 K/UL (ref 0–0.1)
BASOPHILS NFR BLD: 0 % (ref 0–2)
BUN SERPL-MCNC: 7 MG/DL (ref 7–18)
BUN/CREAT SERPL: 7 (ref 12–20)
CALCIUM SERPL-MCNC: 8.9 MG/DL (ref 8.5–10.1)
CHLORIDE SERPL-SCNC: 107 MMOL/L (ref 100–111)
CO2 SERPL-SCNC: 24 MMOL/L (ref 21–32)
CREAT SERPL-MCNC: 1.02 MG/DL (ref 0.6–1.3)
DIFFERENTIAL METHOD BLD: ABNORMAL
EOSINOPHIL # BLD: 0 K/UL (ref 0–0.4)
EOSINOPHIL NFR BLD: 0 % (ref 0–5)
ERYTHROCYTE [DISTWIDTH] IN BLOOD BY AUTOMATED COUNT: 14.4 % (ref 11.6–14.5)
GLUCOSE SERPL-MCNC: 148 MG/DL (ref 74–99)
HCT VFR BLD AUTO: 34.3 % (ref 35–45)
HGB BLD-MCNC: 11.8 G/DL (ref 12–16)
LIPASE SERPL-CCNC: 42 U/L (ref 73–393)
LYMPHOCYTES # BLD: 2.6 K/UL (ref 0.9–3.6)
LYMPHOCYTES NFR BLD: 24 % (ref 21–52)
MCH RBC QN AUTO: 26.6 PG (ref 24–34)
MCHC RBC AUTO-ENTMCNC: 34.4 G/DL (ref 31–37)
MCV RBC AUTO: 77.4 FL (ref 74–97)
MONOCYTES # BLD: 0.4 K/UL (ref 0.05–1.2)
MONOCYTES NFR BLD: 4 % (ref 3–10)
NEUTS SEG # BLD: 7.7 K/UL (ref 1.8–8)
NEUTS SEG NFR BLD: 71 % (ref 40–73)
PLATELET # BLD AUTO: 409 K/UL (ref 135–420)
PMV BLD AUTO: 10.6 FL (ref 9.2–11.8)
POTASSIUM SERPL-SCNC: 3.6 MMOL/L (ref 3.5–5.5)
RBC # BLD AUTO: 4.43 M/UL (ref 4.2–5.3)
SODIUM SERPL-SCNC: 139 MMOL/L (ref 136–145)
WBC # BLD AUTO: 10.8 K/UL (ref 4.6–13.2)

## 2021-04-29 PROCEDURE — 96375 TX/PRO/DX INJ NEW DRUG ADDON: CPT

## 2021-04-29 PROCEDURE — 74011250636 HC RX REV CODE- 250/636: Performed by: EMERGENCY MEDICINE

## 2021-04-29 PROCEDURE — 96374 THER/PROPH/DIAG INJ IV PUSH: CPT

## 2021-04-29 PROCEDURE — 74011250636 HC RX REV CODE- 250/636: Performed by: INTERNAL MEDICINE

## 2021-04-29 PROCEDURE — 96372 THER/PROPH/DIAG INJ SC/IM: CPT

## 2021-04-29 PROCEDURE — 74011000250 HC RX REV CODE- 250: Performed by: INTERNAL MEDICINE

## 2021-04-29 PROCEDURE — 96361 HYDRATE IV INFUSION ADD-ON: CPT

## 2021-04-29 RX ORDER — FAMOTIDINE 20 MG/1
20 TABLET, FILM COATED ORAL 2 TIMES DAILY
Qty: 14 TAB | Refills: 0 | Status: SHIPPED | OUTPATIENT
Start: 2021-04-29 | End: 2021-05-06

## 2021-04-29 RX ORDER — DICYCLOMINE HYDROCHLORIDE 10 MG/ML
20 INJECTION INTRAMUSCULAR
Status: COMPLETED | OUTPATIENT
Start: 2021-04-29 | End: 2021-04-29

## 2021-04-29 RX ADMIN — DICYCLOMINE HYDROCHLORIDE 20 MG: 20 INJECTION, SOLUTION INTRAMUSCULAR at 02:55

## 2021-04-29 RX ADMIN — ONDANSETRON 4 MG: 2 INJECTION INTRAMUSCULAR; INTRAVENOUS at 00:04

## 2021-04-29 RX ADMIN — FAMOTIDINE 20 MG: 10 INJECTION INTRAVENOUS at 02:54

## 2021-04-29 RX ADMIN — SODIUM CHLORIDE 1000 ML: 900 INJECTION, SOLUTION INTRAVENOUS at 00:04

## 2021-04-29 NOTE — DISCHARGE INSTRUCTIONS
Patient Education   Patient Education        Food Poisoning: Care Instructions  Your Care Instructions     Food poisoning occurs when you eat foods that contain harmful germs. Food can be contaminated while it is growing, during processing, or when it is prepared. Fresh fruits and vegetables also can be contaminated if they are washed in contaminated water. You may have become ill after eating undercooked meat or eggs or other unsafe foods. Cooking foods thoroughly and storing them properly can help prevent food poisoning. There are many types of food poisoning. Your symptoms depend on the type of food poisoning you have. You will probably begin to feel better in 1 or 2 days. In the meantime, get plenty of rest and make sure that you do not become dehydrated. Follow-up care is a key part of your treatment and safety. Be sure to make and go to all appointments, and call your doctor if you are having problems. It's also a good idea to know your test results and keep a list of the medicines you take. How can you care for yourself at home? · To prevent dehydration, drink plenty of fluids. Choose water and other caffeine-free clear liquids until you feel better. If you have kidney, heart, or liver disease and have to limit fluids, talk with your doctor before you increase the amount of fluids you drink. · Begin eating small amounts of mild, low-fat foods, depending on how you feel. Try foods like plain rice, broiled chicken, toast, and yogurt. ? Avoid spicy foods, alcohol, and coffee until 48 hours after all symptoms have disappeared. ? Avoid chewing gum that contains sorbitol. ? Avoid dairy products for 3 days after symptoms disappear. · Take your medicines as prescribed. Call your doctor if you think you are having a problem with your medicine. You will get more details on the specific medicines your doctor prescribes. To prevent food poisoning  · Keep hot foods hot and cold foods cold.   · Do not eat meats, dressings, salads, or other foods that have been kept at room temperature for more than 2 hours. · Use a thermometer to check your refrigerator. It should be between 34°F and 40°F.  · Defrost meats in the refrigerator or microwave, not on the kitchen counter. · Keep your hands and your kitchen clean. Wash your hands, cutting boards, and countertops with hot, soapy water. If you use the same cutting board for chopping vegetables and preparing raw meat, be sure to wash the cutting board with hot, soapy water between each use. · Cook meat until it is well done. · Do not eat raw eggs or uncooked dough or sauces made with raw eggs. · Do not take chances. If you think food looks or tastes spoiled, throw it out. When should you call for help? Call 911 anytime you think you may need emergency care. For example, call if:    · You passed out (lost consciousness). Call your doctor now or seek immediate medical care if:    · You have new or worse belly pain.     · You have a new or higher fever.     · You are dizzy or lightheaded, or you feel like you may faint.     · You have symptoms of dehydration, such as:  ? Dry eyes and a dry mouth. ? Passing only a little urine. ? Feeling thirstier than normal.     · You cannot keep down medicine or fluids.     · You have new or more blood in stools.     · You have new or worse vomiting or diarrhea. Watch closely for changes in your health, and be sure to contact your doctor if:    · You do not get better as expected. Where can you learn more? Go to http://www.gray.com/  Enter C880 in the search box to learn more about \"Food Poisoning: Care Instructions. \"  Current as of: September 23, 2020               Content Version: 12.8  © 1001-9606 [employer]. Care instructions adapted under license by [employer] (which disclaims liability or warranty for this information).  If you have questions about a medical condition or this instruction, always ask your healthcare professional. [place] [address] any warranty or liability for your use of this information. Gastritis: Care Instructions  Your Care Instructions     Gastritis is a sore and upset stomach. It happens when something irritates the stomach lining. Many things can cause it. These include an infection such as the flu or something you ate or drank. Medicines or a sore on the lining of the stomach (ulcer) also can cause it. Your belly may bloat and ache. You may belch, vomit, and feel sick to your stomach. You should be able to relieve the problem by taking medicine. And it may help to change your diet. If gastritis lasts, your doctor may prescribe medicine. Follow-up care is a key part of your treatment and safety. Be sure to make and go to all appointments, and call your doctor if you are having problems. It's also a good idea to know your test results and keep a list of the medicines you take. How can you care for yourself at home? · If your doctor prescribed antibiotics, take them as directed. Do not stop taking them just because you feel better. You need to take the full course of antibiotics. · Be safe with medicines. If your doctor prescribed medicine to decrease stomach acid, take it as directed. Call your doctor if you think you are having a problem with your medicine. · Do not take any other medicine, including over-the-counter pain relievers, without talking to your doctor first.  · If your doctor recommends over-the-counter medicine to reduce stomach acid, such as Pepcid AC (famotidine), Prilosec (omeprazole), or Tagamet HB (cimetidine) follow the directions on the label. · Drink plenty of fluids to prevent dehydration. Choose water and other caffeine-free clear liquids. If you have kidney, heart, or liver disease and have to limit fluids, talk with your doctor before you increase the amount of fluids you drink.   · Limit how much alcohol you drink. · Avoid coffee, tea, cola drinks, chocolate, and other foods with caffeine. They increase stomach acid. When should you call for help? Call 911 anytime you think you may need emergency care. For example, call if:    · You vomit blood or what looks like coffee grounds.     · You pass maroon or very bloody stools. Call your doctor now or seek immediate medical care if:    · You start breathing fast and have not produced urine in the last 8 hours.     · You cannot keep fluids down. Watch closely for changes in your health, and be sure to contact your doctor if:    · You do not get better as expected. Where can you learn more? Go to http://www.fung.com/  Enter Z536 in the search box to learn more about \"Gastritis: Care Instructions. \"  Current as of: April 15, 2020               Content Version: 12.8  © 3444-7765 [employer]. Care instructions adapted under license by [employer] (which disclaims liability or warranty for this information). If you have questions about a medical condition or this instruction, always ask your healthcare professional. [address] any warranty or liability for your use of this information.

## 2021-04-29 NOTE — ED NOTES
Patient up for discharge. Discharge results have been reviewed with patient by the Provider. Armband removed and shredded per policy. Encouraged to voice any concerns, and all concerns addressed. Patient discharged in stable condition with no apparent distress. Current Discharge Medication List      START taking these medications    Details   famotidine (Pepcid) 20 mg tablet Take 1 Tab by mouth two (2) times a day for 7 days.   Qty: 14 Tab, Refills: 0

## 2021-04-29 NOTE — ED NOTES
Patient states she ate at [employer] Monday and then all this stomach problems started\". Provider informed.

## 2021-04-29 NOTE — ED TRIAGE NOTES
Pt here for evaluation of abdominal pain, nausea and chills x 2-3 days. States she was evaluated at [facility] yesterday. States blood work and CT were normal. COVID test performed yesterday was negative. Denies diarrhea. Stat es she was prescribed something for nausea (Zofran) which \"helps a little\". Pt was also prescribed Bentyl which she states she isn't taking because it \"hurts stomach\". Reports decreased urine output today.

## 2021-04-29 NOTE — ED NOTES
Pt was provided with a sterile specimen cup and instructed to collect clean-catch urine specimen; she verbalized understanding.

## 2021-04-29 NOTE — ED PROVIDER NOTES
HPI Pt here for evaluation of abdominal pain, nausea and chills x 2-3 days. States she was evaluated at [facility] yesterday. States blood work and CT were normal. COVID test performed yesterday was negative. Denies diarrhea. States she was prescribed something for nausea (Zofran) which \"helps a little\". Pt was also prescribed Bentyl which she states she isn't taking because it \"hurts stomach\". Reports decreased urine output today. Patient symptoms started 4 days ago after eating at a restaurant.      Past Medical History:   Diagnosis Date    Back pain     Cervical paraspinal muscle spasm     Cervical radiculopathy     Chronic hip pain     Depression     Diabetes (HCC)     History of asthma     History of bronchitis     Hypertension     Hypothyroid     Prediabetes     Sciatica     Scoliosis     Sickle cell trait ([facility] [address]        Past Surgical History:   Procedure Laterality Date    HX CARPAL TUNNEL RELEASE Right     HX HYSTERECTOMY      HX ORTHOPAEDIC      cyst removed right wrist    HX THYROIDECTOMY           Family History:   Problem Relation Age of Onset    Asthma Mother     Tuberculosis Mother     Alcohol abuse Mother     Drug Abuse Mother     Diabetes Father     Asthma Father     Heart Disease Father     Sickle Cell Anemia Sister     Depression Brother     Lung Cancer Maternal Grandmother     Hypertension Maternal Grandmother     Colon Cancer Paternal Grandmother     Emphysema Paternal Grandmother     Depression Brother     Depression Brother     Depression Brother        Social History     Socioeconomic History    Marital status:      Spouse name: Not on file    Number of children: Not on file    Years of education: Not on file    Highest education level: Not on file   Occupational History    Not on file   Social Needs    Financial resource strain: Not on file    Food insecurity     Worry: Not on file     Inability: Not on file   [employer] [place] needs     Medical: Not on file     Non-medical: Not on file   Tobacco Use    Smoking status: Current Every Day Smoker     Packs/day: 0.50    Smokeless tobacco: Never Used   Substance and Sexual Activity    Alcohol use: No    Drug use: No    Sexual activity: Not on file   Lifestyle    Physical activity     Days per week: Not on file     Minutes per session: Not on file    Stress: Not on file   Relationships    Social connections     Talks on phone: Not on file     Gets together: Not on file     Attends [employer] service: Not on file     Active member of club or organization: Not on file     Attends meetings of clubs or organizations: Not on file     Relationship status: Not on file    Intimate partner violence     Fear of current or ex partner: Not on file     Emotionally abused: Not on file     Physically abused: Not on file     Forced sexual activity: Not on file   Other Topics Concern    Not on file   Social History Narrative    Not on file         ALLERGIES: Tramadol    Review of Systems   Constitutional: Negative. HENT: Negative. Eyes: Negative. Respiratory: Negative. Cardiovascular: Negative. Gastrointestinal: Negative. Endocrine: Negative. Genitourinary: Negative. Musculoskeletal: Negative. Skin: Negative. Allergic/Immunologic: Negative. Neurological: Negative. Hematological: Negative. Psychiatric/Behavioral: Negative. All other systems reviewed and are negative. Vitals:    04/28/21 2118   BP: (!) 145/93   Pulse: 98   Resp: 20   Temp: 98.6 °F (37 °C)   SpO2: 100%   Weight: 93.7 kg (206 lb 9.6 oz)   Height: 6' 1\" (1.854 m)            Physical Exam  Vitals signs and nursing note reviewed. Constitutional:       General: She is not in acute distress. Appearance: She is well-developed. She is not diaphoretic. HENT:      Head: Normocephalic.       Right Ear: External ear normal.      Left Ear: External ear normal.      Mouth/Throat:      Pharynx: No oropharyngeal exudate. Eyes:      General: No scleral icterus. Right eye: No discharge. Left eye: No discharge. Conjunctiva/sclera: Conjunctivae normal.      Pupils: Pupils are equal, round, and reactive to light. Neck:      Musculoskeletal: Normal range of motion and neck supple. Thyroid: No thyromegaly. Vascular: No JVD. Trachea: No tracheal deviation. Cardiovascular:      Rate and Rhythm: Normal rate and regular rhythm. Heart sounds: Normal heart sounds. No murmur. No friction rub. No gallop. Pulmonary:      Effort: Pulmonary effort is normal. No respiratory distress. Breath sounds: Normal breath sounds. No stridor. No wheezing or rales. Chest:      Chest wall: No tenderness. Abdominal:      General: Bowel sounds are normal. There is no distension. Palpations: Abdomen is soft. There is no mass. Tenderness: There is no abdominal tenderness. There is no guarding or rebound. Musculoskeletal: Normal range of motion. General: No tenderness. Lymphadenopathy:      Cervical: No cervical adenopathy. Skin:     General: Skin is warm and dry. Coloration: Skin is not pale. Findings: No erythema or rash. Neurological:      Mental Status: She is alert and oriented to person, place, and time. Cranial Nerves: No cranial nerve deficit. Motor: No abnormal muscle tone.       Coordination: Coordination normal.      Deep Tendon Reflexes: Reflexes normal.          MDM  Number of Diagnoses or Management Options     Amount and/or Complexity of Data Reviewed  Clinical lab tests: ordered and reviewed  Tests in the radiology section of CPT®: ordered and reviewed    Risk of Complications, Morbidity, and/or Mortality  Presenting problems: moderate  Diagnostic procedures: moderate  Management options: moderate    Patient Progress  Patient progress: improved         Dif DX: food poisoning, viral syndrome, dehydration, URI, irritable bowel syndrome     Procedures    Dx:  Food poisoning    Disp: D/C  Home. F/U GI in 3 days. Return to ER prn.

## 2021-05-06 ENCOUNTER — PATIENT OUTREACH (OUTPATIENT)
Dept: CASE MANAGEMENT | Age: 41
End: 2021-05-06

## 2021-05-06 NOTE — PROGRESS NOTES
Complex Case Management      Date/Time:  2021 10:47 AM    Method of communication with patient:phone    [id number] ([facility]) contacted the patient by telephone to perform Ambulatory Care Coordination. Verified name and  (PHI) with patient as identifiers. Provided introduction to self, and explanation of the Ambulatory Care Manager's role. Patient states she is interested in CCM but requests ACM call back later today as she is busy at this time. Complex Case Management      Date/Time:  2021 5:50 PM    Method of communication with patient:phone    [facility] ([facility]) contacted the patient by telephone to perform Ambulatory Care Coordination. Verified name and  (PHI) with patient as identifiers. Provided introduction to self, and explanation of the Ambulatory Care Manager's role. Reviewed most recent clinic visit w/ patient who verbalized understanding. Patient given an opportunity to ask questions. Top Challenges reviewed with the patient   1. Patient states that she is declining COVID vaccination because \"of the side effects\". ACM explained that most side effects are minor and short term, states she will 'think' about taking vaccine. 2. States she does not take Lipitor, Metformin on regular basis because of side effects. Denies any side effects except for abdominal discomfort with metformin. ACM explained that most side effects disappear with continuous use of medication but that if she develops severe side effects she needs to have discussion with physician, for possible change in treatments. 3. States BS is \"in the 200's when I don't take Metformin but in the 100's when I do\". The patient agrees to contact the PCP office or the [address] [place] [address] for questions related to their healthcare. Provided contact information for future reference. Disease Specific:   N/A    Home Health Active: No    DME Active: No    Barriers to care?  Lack of knowledge about disease    Advance Care Planning:   Does patient have an Advance Directive:  not on file; education provided     Medication(s):   Medication reconciliation was performed with patient, who verbalizes understanding of administration of home medications. There were no barriers to obtaining medications identified at this time. Referral to Pharm D needed: no     Current Outpatient Medications   Medication Sig    multivit-minerals/folic acid (MULTIVITAMIN GUMMIES PO) Take  by mouth daily.  cyclobenzaprine (FLEXERIL) 10 mg tablet Take 1 Tab by mouth three (3) times daily as needed for Muscle Spasm(s).  gabapentin (NEURONTIN) 300 mg capsule Take 300 mg by mouth three (3) times daily.  metFORMIN ER (GLUCOPHAGE XR) 750 mg tablet Take 1 Tab by mouth daily (with dinner).  glipiZIDE (GLUCOTROL) 5 mg tablet Take 0.5 Tabs by mouth two (2) times daily (with meals).  levothyroxine (SYNTHROID) 175 mcg tablet Take 1 Tab by mouth Daily (before breakfast).  Blood-Glucose Meter (True Metrix Air Glucose Meter) monitoring kit Check glucose three times daily Dx Code E11.65    glucose blood VI test strips (True Metrix Glucose Test Strip) strip Check glucose three times a day DX Code E11.65    lancets (True Comfort Lancet) 30 gauge misc Check glucose three times a day  DX Code E11.65    lisinopril-hydroCHLOROthiazide (PRINZIDE, ZESTORETIC) 20-12.5 mg per tablet TAKE 1 TABLET BY MOUTH DAILY    HYDROcodone-acetaminophen (NORCO) 5-325 mg per tablet TK 1 T PO BID PRN    atorvastatin (LIPITOR) 40 mg tablet Take 1 Tab by mouth nightly.  docusate sodium (COLACE) 100 mg capsule Take 100 mg by mouth. No current facility-administered medications for this visit. [facility] follow up appointment(s): No future appointments. Non-[facility] follow up appointment(s): NA    Goals Addressed                 This Visit's Progress     Patient verbalizes understanding of self -management goals of living with Diabetes. Patient will begin monitoring glucose 3 x daily  Patient will keep BS log   Patient will take medications as ordered  Patient will be able to verbalize which foods she should avoid and which foods to eat in order to follow ADA/AHA diet            Patient reports that she and family have been living with relatives since her landlord sold the home they were living in, they are looking for a new place. She states that she has not been exercising or eating properly since moving in with relatives. States she doesn't check BS as ordered because she doesn't like to stick her fingers but is interested in [employer] monitor, states she can better monitor glucose with it. ACM explained importance of keeping BS and BP under control to prevent further illnesses such as kidney disease, patient verbalizes understanding and states she will do better with her medications, diet and exercise.

## 2021-05-14 ENCOUNTER — PATIENT OUTREACH (OUTPATIENT)
Dept: CASE MANAGEMENT | Age: 41
End: 2021-05-14

## 2021-05-14 NOTE — PROGRESS NOTES
Complex Case Management      Date/Time:  5/14/2021 1:33 PM     Attempted to reach patient by telephone. Unable to leave  HIPPA compliant message requesting a return call. Will attempt to reach patient at a later time.

## 2021-05-25 ENCOUNTER — PATIENT OUTREACH (OUTPATIENT)
Dept: CASE MANAGEMENT | Age: 41
End: 2021-05-25

## 2021-05-25 NOTE — PROGRESS NOTES
Complex Case Management      Date/Time:  5/25/2021 11:08 AM     Attempted to reach patient by telephone. Unable to leave HIPPA compliant message requesting a return call. Will attempt to reach patient at a later time.

## 2021-06-01 ENCOUNTER — PATIENT OUTREACH (OUTPATIENT)
Dept: CASE MANAGEMENT | Age: 41
End: 2021-06-01

## 2021-06-01 NOTE — PROGRESS NOTES
Complex Case Management      Date/Time:  6/1/2021 5:14 PM     Attempted to reach patient by telephone. Unable to leave HIPPA compliant message requesting a return call. Will attempt to reach patient at a later time.
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
## 2021-07-02 ENCOUNTER — PATIENT OUTREACH (OUTPATIENT)
Dept: CASE MANAGEMENT | Age: 41
End: 2021-07-02

## 2021-07-02 NOTE — PROGRESS NOTES
Complex Case Management      Date/Time:  7/2/2021 2:46 PM     Attempted to reach patient by telephone. Left HIPPA compliant message requesting a return call. Will attempt to reach patient at a later time.

## 2021-07-15 ENCOUNTER — PATIENT OUTREACH (OUTPATIENT)
Dept: CASE MANAGEMENT | Age: 41
End: 2021-07-15

## 2021-07-15 NOTE — PROGRESS NOTES
Complex Case Management      Date/Time:  7/15/2021 10:56 AM     Attempted to reach patient by telephone. Left HIPPA compliant message requesting a return call. Will attempt to reach patient at a later time.

## 2021-07-29 ENCOUNTER — PATIENT OUTREACH (OUTPATIENT)
Dept: CASE MANAGEMENT | Age: 41
End: 2021-07-29

## 2021-07-29 RX ORDER — IBUPROFEN 600 MG/1
600 TABLET ORAL DAILY
COMMUNITY

## 2021-07-29 NOTE — PROGRESS NOTES
Complex Case Management      Date/Time:  2021 11:52 AM    Method of communication with patient:phone    [facility] ([facility]) contacted the patient by telephone to perform Ambulatory Care Coordination. Verified name and  (PHI) with patient as identifiers. Provided introduction to self, and explanation of the Ambulatory Care Manager's role. Top Challenges reviewed with the patient   1. Patient has not checked BS for several days, [facility] instructed her to check at least daily and keep log for physician. States she is trying to avoid high carbohydrate foods. 2. Patient needs appointment with PCP to assess her complaints of balding. States she will call and schedule appointment. The patient agrees to contact the PCP office or the [address] [place] [address] for questions related to their healthcare. Provided contact information for future reference. Medication(s):   Medication reconciliation was performed with patient, who verbalizes understanding of administration of home medications. There were no barriers to obtaining medications identified at this time. Referral to Pharm D needed: no     Current Outpatient Medications   Medication Sig    ibuprofen (MOTRIN) 600 mg tablet Take 600 mg by mouth daily.  levothyroxine (SYNTHROID) 175 mcg tablet Take 1 Tablet by mouth Daily (before breakfast).  multivit-minerals/folic acid (MULTIVITAMIN GUMMIES PO) Take  by mouth daily.  cyclobenzaprine (FLEXERIL) 10 mg tablet Take 1 Tab by mouth three (3) times daily as needed for Muscle Spasm(s).  gabapentin (NEURONTIN) 300 mg capsule Take 300 mg by mouth daily.  metFORMIN ER (GLUCOPHAGE XR) 750 mg tablet Take 1 Tab by mouth daily (with dinner).  glipiZIDE (GLUCOTROL) 5 mg tablet Take 0.5 Tabs by mouth two (2) times daily (with meals).     Blood-Glucose Meter (True Metrix Air Glucose Meter) monitoring kit Check glucose three times daily Dx Code E11.65    glucose blood VI test strips (True Metrix Glucose Test Strip) strip Check glucose three times a day DX Code E11.65    lancets (True Comfort Lancet) 30 gauge misc Check glucose three times a day  DX Code E11.65    lisinopril-hydroCHLOROthiazide (PRINZIDE, ZESTORETIC) 20-12.5 mg per tablet TAKE 1 TABLET BY MOUTH DAILY    HYDROcodone-acetaminophen (NORCO) 5-325 mg per tablet TK 1 T PO BID PRN    atorvastatin (LIPITOR) 40 mg tablet Take 1 Tab by mouth nightly.  docusate sodium (COLACE) 100 mg capsule Take 100 mg by mouth. No current facility-administered medications for this visit. [facility] follow up appointment(s): No future appointments. Non-[facility] follow up appointment(s): NA    Goals Addressed                 This Visit's Progress     Patient verbalizes understanding of self -management goals of living with Diabetes.    Not on track     Patient will begin monitoring glucose 3 x daily  Patient will keep BS log   Patient will take medications as ordered  Patient will be able to verbalize which foods she should avoid and which foods to eat in order to follow ADA/AHA diet

## 2021-08-11 DIAGNOSIS — E11.65 TYPE 2 DIABETES MELLITUS WITH HYPERGLYCEMIA, WITHOUT LONG-TERM CURRENT USE OF INSULIN (HCC): Primary | ICD-10-CM

## 2021-08-11 DIAGNOSIS — I10 ESSENTIAL HYPERTENSION: ICD-10-CM

## 2021-08-16 ENCOUNTER — PATIENT OUTREACH (OUTPATIENT)
Dept: CASE MANAGEMENT | Age: 41
End: 2021-08-16

## 2021-08-16 NOTE — PROGRESS NOTES
Patient has graduated from the Complex Case Management  program on 8/16/21. Patient's symptoms are stable at this time. Patient/family has the ability to self-manage. Care management goals have been completed at this time. No further ACM follow up scheduled. Goals Addressed                 This Visit's Progress     COMPLETED: Patient verbalizes understanding of self -management goals of living with Diabetes. Patient will begin monitoring glucose 3 x daily  Patient will keep BS log   Patient will take medications as ordered  Patient will be able to verbalize which foods she should avoid and which foods to eat in order to follow ADA/AHA diet              Pt has ACM's contact information for any further questions, concerns, or needs.     Patients upcoming visits:    Future Appointments   Date Time Provider Donaldo Gabriel   8/18/2021  2:40 PM Jv Barton NP [facility] BS AMB

## 2021-08-17 NOTE — TELEPHONE ENCOUNTER
Last Visit: 3/24/21 with VITALY Galindo  Next Appointment: 8/18/21 with VITALY Gailndo  Previous Refill Encounter(s): 1/21/21 #90    Requested Prescriptions     Pending Prescriptions Disp Refills    metFORMIN ER (GLUCOPHAGE XR) 750 mg tablet 90 Tablet 1     Sig: Take 1 Tablet by mouth daily (with dinner).

## 2021-08-19 RX ORDER — METFORMIN HYDROCHLORIDE 750 MG/1
750 TABLET, EXTENDED RELEASE ORAL
Qty: 90 TABLET | Refills: 1 | Status: SHIPPED | OUTPATIENT
Start: 2021-08-19 | End: 2021-08-20 | Stop reason: SDUPTHER

## 2021-08-20 ENCOUNTER — HOSPITAL ENCOUNTER (EMERGENCY)
Age: 41
Discharge: HOME | End: 2021-08-21
Attending: EMERGENCY MEDICINE
Payer: MEDICARE

## 2021-08-20 ENCOUNTER — HOSPITAL ENCOUNTER (OUTPATIENT)
Dept: LAB | Age: 41
Discharge: HOME | End: 2021-08-20
Payer: MEDICARE

## 2021-08-20 ENCOUNTER — OFFICE VISIT (OUTPATIENT)
Dept: FAMILY MEDICINE CLINIC | Age: 41
End: 2021-08-20
Payer: MEDICARE

## 2021-08-20 VITALS
SYSTOLIC BLOOD PRESSURE: 132 MMHG | TEMPERATURE: 97.1 F | DIASTOLIC BLOOD PRESSURE: 81 MMHG | HEIGHT: 72 IN | RESPIRATION RATE: 20 BRPM | HEART RATE: 115 BPM | BODY MASS INDEX: 27.9 KG/M2 | OXYGEN SATURATION: 98 % | WEIGHT: 206 LBS

## 2021-08-20 VITALS
HEART RATE: 108 BPM | OXYGEN SATURATION: 97 % | RESPIRATION RATE: 18 BRPM | HEIGHT: 72 IN | BODY MASS INDEX: 27.9 KG/M2 | WEIGHT: 206 LBS | TEMPERATURE: 98.1 F | DIASTOLIC BLOOD PRESSURE: 89 MMHG | SYSTOLIC BLOOD PRESSURE: 139 MMHG

## 2021-08-20 DIAGNOSIS — E08.00 DIABETES MELLITUS DUE TO UNDERLYING CONDITION WITH HYPEROSMOLARITY WITHOUT COMA, WITHOUT LONG-TERM CURRENT USE OF INSULIN (HCC): Primary | ICD-10-CM

## 2021-08-20 DIAGNOSIS — E03.2 HYPOTHYROIDISM DUE TO NON-MEDICATION EXOGENOUS SUBSTANCES: Primary | ICD-10-CM

## 2021-08-20 DIAGNOSIS — E11.65 TYPE 2 DIABETES MELLITUS WITH HYPERGLYCEMIA, WITHOUT LONG-TERM CURRENT USE OF INSULIN (HCC): ICD-10-CM

## 2021-08-20 DIAGNOSIS — I10 ESSENTIAL HYPERTENSION: ICD-10-CM

## 2021-08-20 DIAGNOSIS — M79.672 BILATERAL FOOT PAIN: ICD-10-CM

## 2021-08-20 DIAGNOSIS — E55.9 HYPOVITAMINOSIS D: ICD-10-CM

## 2021-08-20 DIAGNOSIS — M79.671 BILATERAL FOOT PAIN: ICD-10-CM

## 2021-08-20 DIAGNOSIS — L02.93 RECURRENT BOILS: ICD-10-CM

## 2021-08-20 DIAGNOSIS — E03.2 HYPOTHYROIDISM DUE TO NON-MEDICATION EXOGENOUS SUBSTANCES: ICD-10-CM

## 2021-08-20 LAB
25(OH)D3 SERPL-MCNC: 33.6 NG/ML (ref 30–100)
ALBUMIN SERPL-MCNC: 3.5 G/DL (ref 3.4–5)
ALBUMIN SERPL-MCNC: 3.8 G/DL (ref 3.4–5)
ALBUMIN/GLOB SERPL: 0.8 {RATIO} (ref 0.8–1.7)
ALBUMIN/GLOB SERPL: 1 {RATIO} (ref 0.8–1.7)
ALP SERPL-CCNC: 114 U/L (ref 45–117)
ALP SERPL-CCNC: 116 U/L (ref 45–117)
ALT SERPL-CCNC: 21 U/L (ref 13–56)
ALT SERPL-CCNC: 24 U/L (ref 13–56)
ANION GAP SERPL CALC-SCNC: 11 MMOL/L (ref 3–18)
ANION GAP SERPL CALC-SCNC: 5 MMOL/L (ref 3–18)
AST SERPL-CCNC: 13 U/L (ref 10–38)
AST SERPL-CCNC: 16 U/L (ref 10–38)
BASOPHILS # BLD: 0 K/UL (ref 0–0.1)
BASOPHILS # BLD: 0.1 K/UL (ref 0–0.1)
BASOPHILS NFR BLD: 0 % (ref 0–2)
BASOPHILS NFR BLD: 0 % (ref 0–2)
BILIRUB SERPL-MCNC: 0.5 MG/DL (ref 0.2–1)
BILIRUB SERPL-MCNC: 0.5 MG/DL (ref 0.2–1)
BUN SERPL-MCNC: 9 MG/DL (ref 7–18)
BUN SERPL-MCNC: 9 MG/DL (ref 7–18)
BUN/CREAT SERPL: 7 (ref 12–20)
BUN/CREAT SERPL: 8 (ref 12–20)
CALCIUM SERPL-MCNC: 8.5 MG/DL (ref 8.5–10.1)
CALCIUM SERPL-MCNC: 9 MG/DL (ref 8.5–10.1)
CHLORIDE SERPL-SCNC: 101 MMOL/L (ref 100–111)
CHLORIDE SERPL-SCNC: 99 MMOL/L (ref 100–111)
CHOLEST SERPL-MCNC: 194 MG/DL
CO2 SERPL-SCNC: 26 MMOL/L (ref 21–32)
CO2 SERPL-SCNC: 27 MMOL/L (ref 21–32)
CREAT SERPL-MCNC: 1.14 MG/DL (ref 0.6–1.3)
CREAT SERPL-MCNC: 1.28 MG/DL (ref 0.6–1.3)
DIFFERENTIAL METHOD BLD: ABNORMAL
DIFFERENTIAL METHOD BLD: NORMAL
EOSINOPHIL # BLD: 0.1 K/UL (ref 0–0.4)
EOSINOPHIL # BLD: 0.1 K/UL (ref 0–0.4)
EOSINOPHIL NFR BLD: 1 % (ref 0–5)
EOSINOPHIL NFR BLD: 1 % (ref 0–5)
ERYTHROCYTE [DISTWIDTH] IN BLOOD BY AUTOMATED COUNT: 13.9 % (ref 11.6–14.5)
ERYTHROCYTE [DISTWIDTH] IN BLOOD BY AUTOMATED COUNT: 14.2 % (ref 11.6–14.5)
EST. AVERAGE GLUCOSE BLD GHB EST-MCNC: 232 MG/DL
GLOBULIN SER CALC-MCNC: 3.6 G/DL (ref 2–4)
GLOBULIN SER CALC-MCNC: 4.6 G/DL (ref 2–4)
GLUCOSE BLD STRIP.AUTO-MCNC: 148 MG/DL (ref 70–110)
GLUCOSE BLD STRIP.AUTO-MCNC: 428 MG/DL (ref 70–110)
GLUCOSE BLD STRIP.AUTO-MCNC: 480 MG/DL (ref 70–110)
GLUCOSE SERPL-MCNC: 433 MG/DL (ref 74–99)
GLUCOSE SERPL-MCNC: 556 MG/DL (ref 74–99)
HBA1C MFR BLD: 9.7 % (ref 4.2–5.6)
HCT VFR BLD AUTO: 35.8 % (ref 35–45)
HCT VFR BLD AUTO: 37.3 % (ref 35–45)
HDLC SERPL-MCNC: 32 MG/DL (ref 40–60)
HDLC SERPL: 6.1 {RATIO} (ref 0–5)
HGB BLD-MCNC: 12.1 G/DL (ref 12–16)
HGB BLD-MCNC: 13 G/DL (ref 12–16)
LDLC SERPL CALC-MCNC: 111.4 MG/DL (ref 0–100)
LIPASE SERPL-CCNC: 139 U/L (ref 73–393)
LIPID PROFILE,FLP: ABNORMAL
LYMPHOCYTES # BLD: 2.7 K/UL (ref 0.9–3.6)
LYMPHOCYTES # BLD: 4.6 K/UL (ref 0.9–3.6)
LYMPHOCYTES NFR BLD: 35 % (ref 21–52)
LYMPHOCYTES NFR BLD: 40 % (ref 21–52)
MCH RBC QN AUTO: 26.5 PG (ref 24–34)
MCH RBC QN AUTO: 26.9 PG (ref 24–34)
MCHC RBC AUTO-ENTMCNC: 33.8 G/DL (ref 31–37)
MCHC RBC AUTO-ENTMCNC: 34.9 G/DL (ref 31–37)
MCV RBC AUTO: 77.1 FL (ref 74–97)
MCV RBC AUTO: 78.3 FL (ref 74–97)
MONOCYTES # BLD: 0.4 K/UL (ref 0.05–1.2)
MONOCYTES # BLD: 0.5 K/UL (ref 0.05–1.2)
MONOCYTES NFR BLD: 5 % (ref 3–10)
MONOCYTES NFR BLD: 5 % (ref 3–10)
NEUTS SEG # BLD: 4.6 K/UL (ref 1.8–8)
NEUTS SEG # BLD: 6 K/UL (ref 1.8–8)
NEUTS SEG NFR BLD: 53 % (ref 40–73)
NEUTS SEG NFR BLD: 59 % (ref 40–73)
PLATELET # BLD AUTO: 377 K/UL (ref 135–420)
PLATELET # BLD AUTO: 431 K/UL (ref 135–420)
PMV BLD AUTO: 11.2 FL (ref 9.2–11.8)
PMV BLD AUTO: 11.3 FL (ref 9.2–11.8)
POTASSIUM SERPL-SCNC: 3.4 MMOL/L (ref 3.5–5.5)
POTASSIUM SERPL-SCNC: 3.8 MMOL/L (ref 3.5–5.5)
PROT SERPL-MCNC: 7.1 G/DL (ref 6.4–8.2)
PROT SERPL-MCNC: 8.4 G/DL (ref 6.4–8.2)
RBC # BLD AUTO: 4.57 M/UL (ref 4.2–5.3)
RBC # BLD AUTO: 4.84 M/UL (ref 4.2–5.3)
SODIUM SERPL-SCNC: 133 MMOL/L (ref 136–145)
SODIUM SERPL-SCNC: 136 MMOL/L (ref 136–145)
T4 FREE SERPL-MCNC: 1.8 NG/DL (ref 0.7–1.5)
TRIGL SERPL-MCNC: 253 MG/DL (ref ?–150)
TSH SERPL DL<=0.05 MIU/L-ACNC: 0.01 UIU/ML (ref 0.36–3.74)
VLDLC SERPL CALC-MCNC: 50.6 MG/DL
WBC # BLD AUTO: 11.3 K/UL (ref 4.6–13.2)
WBC # BLD AUTO: 7.8 K/UL (ref 4.6–13.2)

## 2021-08-20 PROCEDURE — 85025 COMPLETE CBC W/AUTO DIFF WBC: CPT

## 2021-08-20 PROCEDURE — G8752 SYS BP LESS 140: HCPCS | Performed by: NURSE PRACTITIONER

## 2021-08-20 PROCEDURE — 74011250636 HC RX REV CODE- 250/636: Performed by: EMERGENCY MEDICINE

## 2021-08-20 PROCEDURE — 82962 GLUCOSE BLOOD TEST: CPT

## 2021-08-20 PROCEDURE — 2022F DILAT RTA XM EVC RTNOPTHY: CPT | Performed by: NURSE PRACTITIONER

## 2021-08-20 PROCEDURE — G8754 DIAS BP LESS 90: HCPCS | Performed by: NURSE PRACTITIONER

## 2021-08-20 PROCEDURE — G8427 DOCREV CUR MEDS BY ELIG CLIN: HCPCS | Performed by: NURSE PRACTITIONER

## 2021-08-20 PROCEDURE — G9717 DOC PT DX DEP/BP F/U NT REQ: HCPCS | Performed by: NURSE PRACTITIONER

## 2021-08-20 PROCEDURE — 99214 OFFICE O/P EST MOD 30 MIN: CPT | Performed by: NURSE PRACTITIONER

## 2021-08-20 PROCEDURE — 80053 COMPREHEN METABOLIC PANEL: CPT

## 2021-08-20 PROCEDURE — 99284 EMERGENCY DEPT VISIT MOD MDM: CPT

## 2021-08-20 PROCEDURE — 96361 HYDRATE IV INFUSION ADD-ON: CPT

## 2021-08-20 PROCEDURE — 83036 HEMOGLOBIN GLYCOSYLATED A1C: CPT

## 2021-08-20 PROCEDURE — 82306 VITAMIN D 25 HYDROXY: CPT

## 2021-08-20 PROCEDURE — 36415 COLL VENOUS BLD VENIPUNCTURE: CPT

## 2021-08-20 PROCEDURE — 74011636637 HC RX REV CODE- 636/637: Performed by: EMERGENCY MEDICINE

## 2021-08-20 PROCEDURE — G8419 CALC BMI OUT NRM PARAM NOF/U: HCPCS | Performed by: NURSE PRACTITIONER

## 2021-08-20 PROCEDURE — 84439 ASSAY OF FREE THYROXINE: CPT

## 2021-08-20 PROCEDURE — 3046F HEMOGLOBIN A1C LEVEL >9.0%: CPT | Performed by: NURSE PRACTITIONER

## 2021-08-20 PROCEDURE — 84443 ASSAY THYROID STIM HORMONE: CPT

## 2021-08-20 PROCEDURE — 83690 ASSAY OF LIPASE: CPT

## 2021-08-20 PROCEDURE — 80061 LIPID PANEL: CPT

## 2021-08-20 PROCEDURE — 96374 THER/PROPH/DIAG INJ IV PUSH: CPT

## 2021-08-20 RX ORDER — METFORMIN HYDROCHLORIDE 750 MG/1
750 TABLET, EXTENDED RELEASE ORAL 2 TIMES DAILY
Qty: 180 TABLET | Refills: 1 | Status: SHIPPED | OUTPATIENT
Start: 2021-08-20 | End: 2021-09-22 | Stop reason: SINTOL

## 2021-08-20 RX ORDER — CEPHALEXIN 500 MG/1
500 CAPSULE ORAL 3 TIMES DAILY
Qty: 30 CAPSULE | Refills: 0 | Status: SHIPPED | OUTPATIENT
Start: 2021-08-20 | End: 2021-08-30

## 2021-08-20 RX ORDER — BLOOD-GLUCOSE CONTROL, NORMAL
EACH MISCELLANEOUS
Qty: 300 LANCET | Refills: 1 | Status: SHIPPED | OUTPATIENT
Start: 2021-08-20

## 2021-08-20 RX ADMIN — INSULIN HUMAN 10 UNITS: 100 INJECTION, SOLUTION PARENTERAL at 22:25

## 2021-08-20 RX ADMIN — SODIUM CHLORIDE 1000 ML: 900 INJECTION, SOLUTION INTRAVENOUS at 22:01

## 2021-08-20 NOTE — PATIENT INSTRUCTIONS
Learning About Foot and Toenail Care  Foot and toenail care: Overview  Checking your loved one's feet and keeping them clean and soft can help prevent cracks and infection in the skin. This is especially important for people who have diabetes. Keeping toenails trimmedand polished if that's what the person likesalso helps the person feel well-groomed. If the person you care for has diabetes or has foot problems, such as bad bunions and corns, think about taking them to see a podiatrist. This is a doctor who specializes in the care of the feet. Sometimes a podiatrist will come to the home if the person can't go out for visits. Try to take the person for salon pedicures if that is what they want. It's a chance to get out and see people and continue a favorite activity. You can do basic nail care at home. Usually all you need to do is keep the nails clean and at a safe length. How do you trim someone's toenails? Try to trim the person's nails every week. Or check the nails each week to see if they need to be trimmed. It's easiest to trim nails after the person has had a shower or foot bath. It makes the nails softer and easier to trim. Start by gathering your supplies. You will need toenail clippers and a nail file. You may also need nail polish and nail polish remover. To trim the nails:  1. Wash and dry your hands. You don't need to wear gloves. 2. Use nail polish remover to take off any polish. 3. Hold the person's foot and toe steady with one hand while you trim the nail with your other hand. Trim the nails straight across. Leave the nails a little longer at the corners so that the sharp ends don't cut into the skin. 4. Keep the nails no longer than the tip of the toes. 5. Let the nails dry if they are still damp and soft. 6. Use a nail file to gently smooth the edges of the nails, especially at the corners. They may be sharp after the nails are cut straight.   7. Apply nail polish, if the person wants it. If the person's nails are thick and discolored, it may be safest to have a podiatrist cut them. What else do you need to know? When you're caring for someone's nails, it is important to remember not to trim or cut the cuticles. A minor cut in a cuticle could lead to an infection. Wash the feet daily in the shower or bath or in a basin made for washing feet. It's extra important to wash the feet carefully if the person has diabetes. After washing the feet, dry gently. Put lotion on the feet, especially on the heels. But don't put it between the toes. If the person doesn't have diabetes and you see signs of athlete's foot (such as dry, cracking, or itchy skin between the toes), you can try an over-the-counter medicine. These medicines can kill the fungus that causes athlete's foot. If the problem doesn't go away, talk to the person's doctor. Look every day for cuts or signs of infection, such as pain, swelling, redness, or warmth. If you see any of these signsespecially in someone who has diabetescall the doctor. Where can you learn more? Go to http://www.gray.com/  Enter D626 in the search box to learn more about \"Learning About Foot and Toenail Care. \"  Current as of: July 17, 2020               Content Version: 12.8  © 9028-1533 [employer]. Care instructions adapted under license by [employer] (which disclaims liability or warranty for this information). If you have questions about a medical condition or this instruction, always ask your healthcare professional. [place] [address] any warranty or liability for your use of this information.

## 2021-08-20 NOTE — PROGRESS NOTES
Subjective:    Sidra Shaikh is a 41y.o. year old female seen for follow up of diabetes. She also has hypertension and hyperlipidemia. Diabetic Review of Systems - medication compliance: compliant all of the time, diabetic diet compliance: noncompliant some of the time, home glucose monitoring: is performed sporadically, fasting values range 150s, further diabetic ROS: no polyuria or polydipsia, no chest pain, dyspnea or TIA's, has dysesthesias in the feet, last eye exam approximately 1 year ago. Other symptoms and concerns:  . Patient further reports she does continue to have increased stressors at home. Also states she is having increased fatigue like to have her thyroid levels checked. Patient Active Problem List   Diagnosis Code    Hypertension I5    Hypothyroid E03.9    Hyperglycemia due to type 2 diabetes mellitus ([facility] [address] E11.65    Nicotine dependence, cigarettes, uncomplicated V33.141    Depression F32.9    DDD (degenerative disc disease), lumbar M51.36    Fatigue R53.83    Urgency of micturition R39.15    Encounter for long-term (current) use of medications Z79.899    Hypovitaminosis D E55.9    Prediabetes R73.03    Sacroiliitis (HCC) M46.1     Current Outpatient Medications   Medication Sig Dispense Refill    metFORMIN ER (GLUCOPHAGE XR) 750 mg tablet Take 1 Tablet by mouth daily (with dinner). 90 Tablet 1    ibuprofen (MOTRIN) 600 mg tablet Take 600 mg by mouth daily.  levothyroxine (SYNTHROID) 175 mcg tablet Take 1 Tablet by mouth Daily (before breakfast). 90 Tablet 0    multivit-minerals/folic acid (MULTIVITAMIN GUMMIES PO) Take  by mouth daily.  cyclobenzaprine (FLEXERIL) 10 mg tablet Take 1 Tab by mouth three (3) times daily as needed for Muscle Spasm(s). 90 Tab 3    gabapentin (NEURONTIN) 300 mg capsule Take 300 mg by mouth daily.  glipiZIDE (GLUCOTROL) 5 mg tablet Take 0.5 Tabs by mouth two (2) times daily (with meals).  90 Tab 0    glucose blood VI test strips (True Metrix Glucose Test Strip) strip Check glucose three times a day DX Code E11.65 300 Strip 1    lancets (True Comfort Lancet) 30 gauge misc Check glucose three times a day  DX Code E11.65 300 Lancet 1    lisinopril-hydroCHLOROthiazide (PRINZIDE, ZESTORETIC) 20-12.5 mg per tablet TAKE 1 TABLET BY MOUTH DAILY 90 Tab 1    HYDROcodone-acetaminophen (NORCO) 5-325 mg per tablet TK 1 T PO BID PRN      atorvastatin (LIPITOR) 40 mg tablet Take 1 Tab by mouth nightly. 90 Tab 0    docusate sodium (COLACE) 100 mg capsule Take 100 mg by mouth.       Blood-Glucose Meter (True Metrix Air Glucose Meter) monitoring kit Check glucose three times daily Dx Code E11.65 1 Kit 0      Allergies   Allergen Reactions    Tramadol Nausea and Vomiting     Past Surgical History:   Procedure Laterality Date    HX CARPAL TUNNEL RELEASE Right     HX HYSTERECTOMY      HX ORTHOPAEDIC      cyst removed right wrist    HX THYROIDECTOMY       Family History   Problem Relation Age of Onset    Asthma Mother     Tuberculosis Mother     Alcohol abuse Mother     Drug Abuse Mother     Diabetes Father     Asthma Father     Heart Disease Father     Sickle Cell Anemia Sister     Depression Brother     Lung Cancer Maternal Grandmother     Hypertension Maternal Grandmother     Colon Cancer Paternal Grandmother     Emphysema Paternal Grandmother     Depression Brother     Depression Brother     Depression Brother       Lab Results   Component Value Date/Time    Cholesterol, total 247 (H) 04/14/2021 10:17 AM    HDL Cholesterol 36 (L) 04/14/2021 10:17 AM    LDL, calculated 178 (H) 04/14/2021 10:17 AM    LDL, calculated 217 (H) 03/05/2020 11:30 AM    VLDL, calculated 33 04/14/2021 10:17 AM    VLDL, calculated 43 (H) 03/05/2020 11:30 AM    Triglyceride 177 (H) 04/14/2021 10:17 AM     Lab Results   Component Value Date/Time    Sodium 139 04/28/2021 11:57 PM    Potassium 3.6 04/28/2021 11:57 PM    Chloride 107 04/28/2021 11:57 PM    CO2 24 04/28/2021 11:57 PM    Anion gap 8 04/28/2021 11:57 PM    Glucose 148 (H) 04/28/2021 11:57 PM    BUN 7 04/28/2021 11:57 PM    Creatinine 1.02 04/28/2021 11:57 PM    BUN/Creatinine ratio 7 (L) 04/28/2021 11:57 PM    GFR est AA >60 04/28/2021 11:57 PM    GFR est non-AA 59 (L) 04/28/2021 11:57 PM    Calcium 8.9 04/28/2021 11:57 PM    Bilirubin, total 0.7 04/14/2021 10:17 AM    Alk.  phosphatase 75 04/14/2021 10:17 AM    Protein, total 7.0 04/14/2021 10:17 AM    Albumin 4.3 04/14/2021 10:17 AM    A-G Ratio 1.6 04/14/2021 10:17 AM    ALT (SGPT) 10 04/14/2021 10:17 AM    AST (SGOT) 12 04/14/2021 10:17 AM     Lab Results   Component Value Date/Time    WBC 10.8 04/28/2021 11:57 PM    HGB 11.8 (L) 04/28/2021 11:57 PM    HCT 34.3 (L) 04/28/2021 11:57 PM    PLATELET 233 34/00/0132 11:57 PM    MCV 77.4 04/28/2021 11:57 PM     Lab Results   Component Value Date/Time    Hemoglobin A1c 9.4 (H) 04/14/2021 10:17 AM     Lab Results   Component Value Date/Time    Microalb/Creat ratio (ug/mg creat.) 4 04/14/2021 10:17 AM     Wt Readings from Last 3 Encounters:   08/20/21 206 lb (93.4 kg)   04/28/21 206 lb 9.6 oz (93.7 kg)   12/11/20 211 lb 12.8 oz (96.1 kg)     Last Point of Care HGB A1C  No results found for: [id number]   BP Readings from Last 3 Encounters:   04/29/21 138/77   12/11/20 (!) 142/94   10/29/20 (!) 156/105     Results for orders placed or performed during the hospital encounter of 04/28/21   URINALYSIS W/ RFLX MICROSCOPIC   Result Value Ref Range    Color YELLOW      Appearance CLEAR      Specific gravity 1.019 1.005 - 1.030      pH (UA) 5.0 5.0 - 8.0      Protein Negative NEG mg/dL    Glucose Negative NEG mg/dL    Ketone 80 (A) NEG mg/dL    Bilirubin Negative NEG      Blood Negative NEG      Urobilinogen 1.0 0.2 - 1.0 EU/dL    Nitrites Negative NEG      Leukocyte Esterase Negative NEG     CBC WITH AUTOMATED DIFF   Result Value Ref Range    WBC 10.8 4.6 - 13.2 K/uL    RBC 4.43 4.20 - 5.30 M/uL    HGB 11.8 (L) 12.0 - 16.0 g/dL    HCT 34.3 (L) 35.0 - 45.0 %    MCV 77.4 74.0 - 97.0 FL    MCH 26.6 24.0 - 34.0 PG    MCHC 34.4 31.0 - 37.0 g/dL    RDW 14.4 11.6 - 14.5 %    PLATELET 083 324 - 931 K/uL    MPV 10.6 9.2 - 11.8 FL    NEUTROPHILS 71 40 - 73 %    LYMPHOCYTES 24 21 - 52 %    MONOCYTES 4 3 - 10 %    EOSINOPHILS 0 0 - 5 %    BASOPHILS 0 0 - 2 %    ABS. NEUTROPHILS 7.7 1.8 - 8.0 K/UL    ABS. LYMPHOCYTES 2.6 0.9 - 3.6 K/UL    ABS. MONOCYTES 0.4 0.05 - 1.2 K/UL    ABS. EOSINOPHILS 0.0 0.0 - 0.4 K/UL    ABS. BASOPHILS 0.0 0.0 - 0.1 K/UL    DF AUTOMATED     METABOLIC PANEL, BASIC   Result Value Ref Range    Sodium 139 136 - 145 mmol/L    Potassium 3.6 3.5 - 5.5 mmol/L    Chloride 107 100 - 111 mmol/L    CO2 24 21 - 32 mmol/L    Anion gap 8 3.0 - 18 mmol/L    Glucose 148 (H) 74 - 99 mg/dL    BUN 7 7.0 - 18 MG/DL    Creatinine 1.02 0.6 - 1.3 MG/DL    BUN/Creatinine ratio 7 (L) 12 - 20      GFR est AA >60 >60 ml/min/1.73m2    GFR est non-AA 59 (L) >60 ml/min/1.73m2    Calcium 8.9 8.5 - 10.1 MG/DL   LIPASE   Result Value Ref Range    Lipase 42 (L) 73 - 393 U/L     Last Diabetic Foot Exam on:   Diabetic Foot and Eye Exam HM Status   Topic Date Due    Diabetic Foot Care  Never done    Eye Exam  Never done       Objective:  Visit Vitals  /81 (BP 1 Location: Left arm, BP Patient Position: Sitting, BP Cuff Size: Adult)   Pulse (!) 115   Temp 97.1 °F (36.2 °C) (Temporal)   Resp 20   Ht 6' 1\" (1.854 m)   Wt 206 lb (93.4 kg)   SpO2 98%   BMI 27.18 kg/m²     Awake and alert in no acute distress   Neck supple without lymphadenopathy, no carotid artery bruits auscultated bilaterally. No thyromegaly   Lungs clear throughout   S1 S2 RRR without ectopy or murmur auscultated.    Extremities: no clubbing, cyanosis, peripheral edema   Abdomen - soft, nontender, nondistended, no masses or organomegaly  Integumentary: no rashes   Reviewed vital signs       Diabetic foot exam:     Left Foot:   Visual Exam: normal    Pulse DP: 2+ (normal)   Filament test: normal sensation    Vibratory sensation: normal      Right Foot:   Visual Exam: normal    Pulse DP: 2+ (normal)   Filament test: normal sensation    Vibratory sensation: normal        Assessment/Plan:    ICD-10-CM ICD-9-CM    1. Hypothyroidism due to non-medication exogenous substances  E03.2 244.3 TSH 3RD GENERATION      T4, FREE   2. Type 2 diabetes mellitus with hyperglycemia, without long-term current use of insulin ([facility])  E11.65 250.00 lancets (True Comfort Lancet) 30 gauge misc     790.29 HEMOGLOBIN A1C WITH EAG      LIPID PANEL      METABOLIC PANEL, COMPREHENSIVE       DIABETES FOOT EXAM       DIABETES EYE EXAM      CBC WITH AUTOMATED DIFF   3. Hypovitaminosis D  E55.9 268.9 VITAMIN D, 25 HYDROXY   4. Essential hypertension  I10 401.9 CBC WITH AUTOMATED DIFF   5. Recurrent boils  L02.93 680.9    6. Bilateral foot pain  M79.671 729.5 REFERRAL TO PODIATRY    M79.672       Orders Placed This Encounter    TSH 3RD GENERATION    T4, FREE    VITAMIN D, 25 HYDROXY    HEMOGLOBIN A1C WITH EAG    LIPID PANEL    METABOLIC PANEL, COMPREHENSIVE    CBC WITH AUTOMATED DIFF    REFERRAL TO PODIATRY    lancets (True Comfort Lancet) 30 gauge misc    metFORMIN ER (GLUCOPHAGE XR) 750 mg tablet    cephALEXin (KEFLEX) 500 mg capsule    SITagliptin (JANUVIA) 100 mg tablet      needs to follow diet more regularly  Issues reviewed with her: low cholesterol diet, weight control and daily exercise discussed, diabetic Sick Day rules reviewed, handout given, foot care discussed and Podiatry visits discussed and annual eye examinations at Ophthalmology discussed. I have discussed the diagnosis with the patient and the intended plan as seen in the above orders. The patient has received an after-visit summary and questions were answered concerning future plans. I have discussed medication side effects and warnings with the patient as well.   Patient agreeable with above plan and verbalizes understanding.

## 2021-08-21 VITALS
OXYGEN SATURATION: 100 % | DIASTOLIC BLOOD PRESSURE: 82 MMHG | HEART RATE: 90 BPM | RESPIRATION RATE: 16 BRPM | TEMPERATURE: 98.6 F | HEIGHT: 72 IN | BODY MASS INDEX: 27.9 KG/M2 | SYSTOLIC BLOOD PRESSURE: 124 MMHG | WEIGHT: 206 LBS

## 2021-08-21 DIAGNOSIS — E11.9 TYPE 2 DIABETES MELLITUS WITHOUT COMPLICATION, WITHOUT LONG-TERM CURRENT USE OF INSULIN (HCC): Primary | ICD-10-CM

## 2021-08-21 LAB
ANION GAP SERPL CALC-SCNC: 6 MMOL/L (ref 3–18)
BUN SERPL-MCNC: 8 MG/DL (ref 7–18)
BUN/CREAT SERPL: 8 (ref 12–20)
CALCIUM SERPL-MCNC: 8.5 MG/DL (ref 8.5–10.1)
CHLORIDE SERPL-SCNC: 105 MMOL/L (ref 100–111)
CO2 SERPL-SCNC: 27 MMOL/L (ref 21–32)
CREAT SERPL-MCNC: 0.96 MG/DL (ref 0.6–1.3)
GLUCOSE SERPL-MCNC: 251 MG/DL (ref 74–99)
POTASSIUM SERPL-SCNC: 3.5 MMOL/L (ref 3.5–5.5)
SODIUM SERPL-SCNC: 138 MMOL/L (ref 136–145)

## 2021-08-21 PROCEDURE — 80048 BASIC METABOLIC PNL TOTAL CA: CPT

## 2021-08-21 PROCEDURE — 99283 EMERGENCY DEPT VISIT LOW MDM: CPT

## 2021-08-21 NOTE — ED TRIAGE NOTES
Patient returns to ER related to elevated blood sugar. She states that she is not taking metformin related to gastric distress.

## 2021-08-21 NOTE — ED TRIAGE NOTES
Patient states that metformin causes abdominal pain, diarrhea and nausea. She states that she is not taking metformin for those reasons.   She states that she is unable to afford the Januvia prescribed by PCP related to out of pocket costs exceeding $100 per month with insurance factored into cost.

## 2021-08-21 NOTE — ED NOTES
Pt educated on hypoglycemia reactions and what to watch for for low blood sugar levels. Pt given some crackers per MD. Pt tolerated well and is laughing and talking on the phone. MD notified and aware.

## 2021-08-21 NOTE — ED PROVIDER NOTES
HPI 52-year-old female who has non-insulin-dependent diabetes. She saw her doctor yesterday and got a call tonight for her to go to the emergency room because her glucose was elevated. However the patient had not been taking her Metformin secondary to having side effect from it. Her PCP changed her Metformin dosage and  To it  twice a day as opposed to one high-dose pill. She had not started taking the medication twice a day. No complaints at present.     Past Medical History:   Diagnosis Date    Back pain     Cervical paraspinal muscle spasm     Cervical radiculopathy     Chronic hip pain     Depression     Diabetes (HCC)     History of asthma     History of bronchitis     Hypertension     Hypothyroid     Prediabetes     Sciatica     Scoliosis     Sickle cell trait (HCC)        Past Surgical History:   Procedure Laterality Date    HX CARPAL TUNNEL RELEASE Right     HX HYSTERECTOMY      HX ORTHOPAEDIC      cyst removed right wrist    HX THYROIDECTOMY           Family History:   Problem Relation Age of Onset    Asthma Mother     Tuberculosis Mother     Alcohol abuse Mother     Drug Abuse Mother     Diabetes Father     Asthma Father     Heart Disease Father     Sickle Cell Anemia Sister     Depression Brother     Lung Cancer Maternal Grandmother     Hypertension Maternal Grandmother     Colon Cancer Paternal Grandmother     Emphysema Paternal Grandmother     Depression Brother     Depression Brother     Depression Brother        Social History     Socioeconomic History    Marital status:      Spouse name: Not on file    Number of children: Not on file    Years of education: Not on file    Highest education level: Not on file   Occupational History    Not on file   Tobacco Use    Smoking status: Current Every Day Smoker     Packs/day: 0.50    Smokeless tobacco: Never Used   Substance and Sexual Activity    Alcohol use: No    Drug use: No    Sexual activity: Not on file   Other Topics Concern    Not on file   Social History Narrative    Not on file     Social Determinants of Health     Financial Resource Strain:     Difficulty of Paying Living Expenses:    Food Insecurity:     Worried About Running Out of Food in the Last Year:     [address] in the Last Year:    Transportation Needs:     Lack of Transportation (Medical):  Lack of Transportation (Non-Medical):    Physical Activity:     Days of Exercise per Week:     Minutes of Exercise per Session:    Stress:     Feeling of Stress :    Social Connections:     Frequency of Communication with Friends and Family:     Frequency of Social Gatherings with Friends and Family:     Attends Holiness Services:     Active Member of Clubs or Organizations:     Attends Club or Organization Meetings:     Marital Status:    Intimate Partner Violence:     Fear of Current or Ex-Partner:     Emotionally Abused:     Physically Abused:     Sexually Abused: ALLERGIES: Tramadol    Review of Systems   Constitutional: Negative. HENT: Negative. Eyes: Negative. Respiratory: Negative. Cardiovascular: Negative. Gastrointestinal: Negative. Endocrine: Negative. Genitourinary: Negative. Musculoskeletal: Negative. Skin: Negative. Allergic/Immunologic: Negative. Neurological: Negative. Hematological: Negative. Psychiatric/Behavioral: Negative. All other systems reviewed and are negative. Vitals:    08/20/21 2155   BP: 139/89   Pulse: (!) 108   Resp: 18   Temp: 98.1 °F (36.7 °C)   SpO2: 97%   Weight: 93.4 kg (206 lb)   Height: 6' 1\" (1.854 m)            Physical Exam  Vitals and nursing note reviewed. Constitutional:       General: She is not in acute distress. Appearance: She is well-developed. She is not diaphoretic. HENT:      Head: Normocephalic.       Right Ear: External ear normal.      Left Ear: External ear normal.      Mouth/Throat:      Pharynx: No oropharyngeal exudate. Eyes:      General: No scleral icterus. Right eye: No discharge. Left eye: No discharge. Conjunctiva/sclera: Conjunctivae normal.      Pupils: Pupils are equal, round, and reactive to light. Neck:      Thyroid: No thyromegaly. Vascular: No JVD. Trachea: No tracheal deviation. Cardiovascular:      Rate and Rhythm: Normal rate and regular rhythm. Heart sounds: Normal heart sounds. No murmur heard. No friction rub. No gallop. Pulmonary:      Effort: Pulmonary effort is normal. No respiratory distress. Breath sounds: Normal breath sounds. No stridor. No wheezing or rales. Chest:      Chest wall: No tenderness. Abdominal:      General: Bowel sounds are normal. There is no distension. Palpations: Abdomen is soft. There is no mass. Tenderness: There is no abdominal tenderness. There is no guarding or rebound. Musculoskeletal:         General: No tenderness. Normal range of motion. Cervical back: Normal range of motion and neck supple. Lymphadenopathy:      Cervical: No cervical adenopathy. Skin:     General: Skin is warm and dry. Coloration: Skin is not pale. Findings: No erythema or rash. Neurological:      Mental Status: She is alert and oriented to person, place, and time. Cranial Nerves: No cranial nerve deficit. Motor: No abnormal muscle tone.       Coordination: Coordination normal.      Deep Tendon Reflexes: Reflexes normal.          MDM  Number of Diagnoses or Management Options     Amount and/or Complexity of Data Reviewed  Clinical lab tests: ordered and reviewed  Tests in the medicine section of CPT®: ordered and reviewed  Discussion of test results with the performing providers: yes    Risk of Complications, Morbidity, and/or Mortality  Presenting problems: high  Diagnostic procedures: high  Management options: moderate    Patient Progress  Patient progress: improved Procedures    Parental diagnosis: Hypoglycemia, diabetes uncontrolled secondary to noncompliance to medication, DKA, hyperosmolar nonketotic syndrome, dehydration    Hospital course: Patient has hyperglycemia secondary to noncompliance of medication secondary to medication side effects. Patient was treated with IV insulin and IV hydration. Her glucose returned to normal.  Patient is feeling great and is ready for discharge. Diagnosis: Diabetes, hyperglycemia    Disposition: Patient is to take her Metformin prescribed by her PCP. She is to follow-up with her PCP tomorrow for readjustment of her medication or change to another oral hypoglycemic medication. Return ER as needed. Dictation disclaimer:  Please note that this dictation was completed with [employer], the computer voice recognition software. Quite often unanticipated grammatical, syntax, homophones, and other interpretive errors are inadvertently transcribed by the computer software. Please disregard these errors. Please excuse any errors that have escaped final proofreading.

## 2021-08-21 NOTE — ED PROVIDER NOTES
EMERGENCY DEPARTMENT HISTORY AND PHYSICAL EXAM      Date: 8/21/2021  Patient Name: Janna Lara    History of Presenting Illness     Chief Complaint   Patient presents with    High Blood Sugar       History (Context): Janna Lara is a 41 y.o. *** with ***    On review of systems, the patient denies fever, chills, rashes, ***    PCP: Jv Barton NP    Current Outpatient Medications   Medication Sig Dispense Refill    linaGLIPtin (TRADJENTA) 5 mg tablet Take 1 Tablet by mouth daily. 30 Tablet 11    cephALEXin (KEFLEX) 500 mg capsule Take 1 Capsule by mouth three (3) times daily for 10 days. 30 Capsule 0    ibuprofen (MOTRIN) 600 mg tablet Take 600 mg by mouth daily.  levothyroxine (SYNTHROID) 175 mcg tablet Take 1 Tablet by mouth Daily (before breakfast). 90 Tablet 0    multivit-minerals/folic acid (MULTIVITAMIN GUMMIES PO) Take  by mouth daily.  cyclobenzaprine (FLEXERIL) 10 mg tablet Take 1 Tab by mouth three (3) times daily as needed for Muscle Spasm(s). 90 Tab 3    gabapentin (NEURONTIN) 300 mg capsule Take 300 mg by mouth daily.  lisinopril-hydroCHLOROthiazide (PRINZIDE, ZESTORETIC) 20-12.5 mg per tablet TAKE 1 TABLET BY MOUTH DAILY 90 Tab 1    HYDROcodone-acetaminophen (NORCO) 5-325 mg per tablet TK 1 T PO BID PRN      atorvastatin (LIPITOR) 40 mg tablet Take 1 Tab by mouth nightly. 90 Tab 0    docusate sodium (COLACE) 100 mg capsule Take 100 mg by mouth.  lancets (True Comfort Lancet) 30 gauge misc Check glucose three times a day  DX Code E11.65 300 Lancet 1    metFORMIN ER (GLUCOPHAGE XR) 750 mg tablet Take 1 Tablet by mouth two (2) times a day. (Patient not taking: Reported on 8/21/2021) 180 Tablet 1    SITagliptin (JANUVIA) 100 mg tablet Take 1 Tablet by mouth daily.  (Patient not taking: Reported on 8/21/2021) 90 Tablet 0    Blood-Glucose Meter (True Metrix Air Glucose Meter) monitoring kit Check glucose three times daily Dx Code E11.65 1 Kit 0    glucose blood VI test strips (True Metrix Glucose Test Strip) strip Check glucose three times a day DX Code E11.65 300 Strip 1       Past History     Past Medical History:  Past Medical History:   Diagnosis Date    Back pain     Cervical paraspinal muscle spasm     Cervical radiculopathy     Chronic hip pain     Depression     Diabetes [facility] [address]     History of asthma     History of bronchitis     Hypertension     Hypothyroid     Prediabetes     Sciatica     Scoliosis     Sickle cell trait ([facility] [address]        Past Surgical History:  Past Surgical History:   Procedure Laterality Date    HX CARPAL TUNNEL RELEASE Right     HX HYSTERECTOMY      HX ORTHOPAEDIC      cyst removed right wrist    HX THYROIDECTOMY         Family History:  Family History   Problem Relation Age of Onset    Asthma Mother     Tuberculosis Mother     Alcohol abuse Mother     Drug Abuse Mother     Diabetes Father     Asthma Father     Heart Disease Father     Sickle Cell Anemia Sister     Depression Brother     Lung Cancer Maternal Grandmother     Hypertension Maternal Grandmother     Colon Cancer Paternal Grandmother     Emphysema Paternal Grandmother     Depression Brother     Depression Brother     Depression Brother        Social History:  Social History     Tobacco Use    Smoking status: Current Every Day Smoker     Packs/day: 0.50    Smokeless tobacco: Never Used   Substance Use Topics    Alcohol use: No    Drug use: No       Allergies: Allergies   Allergen Reactions    Tramadol Nausea and Vomiting       PMH, PSH, family history, social history, allergies reviewed with the patient with significant items noted above. Review of Systems   As per HPI, otherwise reviewed and negative.      Physical Exam     Vitals:    08/21/21 1354 08/21/21 1548   BP: 126/84 124/82   Pulse: (!) 109 90   Resp: 18 16   Temp: 98.6 °F (37 °C)    SpO2: 100% 100%   Weight: 93.4 kg (206 lb)    Height: 6' 1\" (1.854 m)        Gen: Well-appearing, in no acute distress ***  HEENT: Normocephalic, sclera anicteric  Cardiovascular: ***Normal rate, regular rhythm, no murmurs, rubs, gallops. Pulses intact and equal distally. Pulmonary: No respiratory distress. No stridor. Clear lungs. ***  ABD: Soft, nontender, nondistended. Neuro: Alert. Normal speech. Normal mentation. Psych: Normal thought content and thought processes. : No CVA tenderness  EXT: Moves all extremities well. No cyanosis or clubbing. Skin: Warm and well-perfused. Other:***        Diagnostic Study Results     Labs -     Recent Results (from the past 12 hour(s))   METABOLIC PANEL, BASIC    Collection Time: 08/21/21  2:45 PM   Result Value Ref Range    Sodium 138 136 - 145 mmol/L    Potassium 3.5 3.5 - 5.5 mmol/L    Chloride 105 100 - 111 mmol/L    CO2 27 21 - 32 mmol/L    Anion gap 6 3.0 - 18 mmol/L    Glucose 251 (H) 74 - 99 mg/dL    BUN 8 7.0 - 18 MG/DL    Creatinine 0.96 0.6 - 1.3 MG/DL    BUN/Creatinine ratio 8 (L) 12 - 20      GFR est AA >60 >60 ml/min/1.73m2    GFR est non-AA >60 >60 ml/min/1.73m2    Calcium 8.5 8.5 - 10.1 MG/DL       Radiologic Studies -   No orders to display     CT Results  (Last 48 hours)    None        CXR Results  (Last 48 hours)    None            Medical Decision Making   I am the first provider for this patient. I reviewed the vital signs, available nursing notes, past medical history, past surgical history, family history and social history. Vital Signs-Reviewed the patient's vital signs. EKG: Interpreted by myself. ***     Records Reviewed: Personally, on initial evaluation    MDM:   Patient presents with ***. Exam significant for ***.    DDX considered: ***  DDX thought to be less likely but also considered due to high risk condition: ***    Patient condition on initial evaluation: ***    Plan:   Pain Control***  Antiemetics***  Close Observation***  Meds: ***    Orders as below:  Orders Placed This Encounter    METABOLIC PANEL, BASIC    linaGLIPtin (TRADJENTA) 5 mg tablet        ED Course:          Patient condition at time of disposition: ***  DISCHARGE NOTE:   Pt has been reexamined. *** Patient has no new complaints, changes, or physical findings. Care plan outlined and precautions discussed. Results were reviewed with the patient. All medications were reviewed with the patient; will d/c home with ***. All of pt's questions and concerns were addressed. Alarm symptoms and return precautions associated with chief complaint and evaluation were reviewed with the patient in detail. The patient demonstrated adequate understanding. Patient was instructed and agrees to follow up with ***, as well as to return to the ED upon further deterioration. Patient is ready to go home. The patient is ***happy with this plan    Follow-up Information     Follow up With Specialties Details Why Contact Info    Tamanna Whiting NP Nurse Practitioner Schedule an appointment as soon as possible for a visit   [address]  [address] [place]  [address]  [phone] [address] [facility] EMERGENCY DEPT Emergency Medicine Go to  As needed, If symptoms worsen [address] [facility]  [phone]          Current Discharge Medication List      START taking these medications    Details   linaGLIPtin (TRADJENTA) 5 mg tablet Take 1 Tablet by mouth daily. Qty: 30 Tablet, Refills: 11  Start date: 8/21/2021, End date: 8/21/2022             Procedures:  Procedures      Critical Care Time:     Disposition: ***    Diagnosis     Clinical Impression:   1. Type 2 diabetes mellitus without complication, without long-term current use of insulin ([facility])        Signed,  Justin Arnold MD  Emergency Physician  [facility]    As a voice dictation software was utilized to dictate this note, minor word transpositions can occur. I apologize for confusing wording and typographic errors. Please feel free to contact me for clarification.

## 2021-08-21 NOTE — ROUTINE PROCESS
Teri De León is a 41 y.o. female that was discharged in stable. Pt was accompanied by self. Pt is driving. The patients diagnosis, condition and treatment were explained to  patient and aftercare instructions were given. The patient verbalized understanding. Patient armband removed and shredded.

## 2021-08-22 ENCOUNTER — PATIENT OUTREACH (OUTPATIENT)
Dept: CASE MANAGEMENT | Age: 41
End: 2021-08-22

## 2021-08-22 NOTE — PROGRESS NOTES
Care Transitions Nurse (CTN) contacted patient for COVID at risk and ED visit follow up. Verified name and . The patient has recently started checking BG BID and was instructed to check QID, but expresses great difficulty sticking herself that often and is in the process of trying to obtain a [employer]. Reviewed pattern management and encouraged her to continue checking BID, before breakfast and rotating before lunch, before dinner, and at bedtime. She is willing to try this plan. Today,  and before lunch 333. Reviewed ADA target BG goals before meals and 1-2 hrs after meals. The patient reports polydipsia, polyuria, nocturia-waking her up 4x/night, and dizziness. She denies N/V/D/abdominal pain. Reviewed s/s DKA and when to report to the ED. Briefly reviewed CHO counting and how this can have a great impact on her BG management. She is willing to begin reading labels. Encouraged her to try to eat 2-3 meals per day, each with 45-60 g of total CHO and up to 1 snack daily consisting of 15 g of CHO and some protein. Up until recently, she was drinking regular soda daily and has purchased zero sugar soda options. Reviewed that giving up sugar-sweetened beverages can have a big impact on BG control and she is willing to change this habit. Today she has been drinking a lot of water. She does drink coffee with regular sugar, so we reviewed the amount of CHO in 1 Tbsp of sugar, so she is now willing to monitor her sugar intake more closely as well. Reviewed the role of fiber and protein managing BG control as well. The patient is unable to tolerate metformin XR once daily due to N/V/D, even when taken with her largest meal of the day, so she has stopped taking it. She is unable to afford Januvia, which costs >$100 per month. She has not filled Tradjenta yet, as [facility] was out of stock and will be able to fill it tomorrow.      Discussed the impact of activity and exercise on lowering BG and encouraged her to begin walking or doing other physical activity after meals in order to help lower BG. Emailed patient education resources for [employer] and [employer] [place] and [employer] Rx book. Encouraged the patient to discuss DSMES and/or referral to a RD with her PCP. The patient agrees to reach out to her PCP tomorrow to discuss recent ED visits and schedule follow up if indicated. Educated patient about risk for severe COVID-19 due to risk factors according to CDC guidelines. CTN reviewed discharge instructions, medical action plan and red flag symptoms with the patient who verbalized understanding. Discussed COVID vaccination status: yes, patient received the first dose on 8/15/21. Education provided on COVID-19 vaccination as appropriate. Discussed exposure protocols and quarantine with CDC Guidelines. Patient was given an opportunity to verbalize any questions and concerns and agrees to contact CTN or health care provider for questions related to their healthcare.

## 2021-08-23 DIAGNOSIS — E11.65 TYPE 2 DIABETES MELLITUS WITH HYPERGLYCEMIA, WITHOUT LONG-TERM CURRENT USE OF INSULIN (HCC): Primary | ICD-10-CM

## 2021-08-23 NOTE — PROGRESS NOTES
Referral to Pharmacist- Serenity Paget  Dx type 2 diabetes mellitus with hyperglycemia  Per verbal order Raudel Candy NP-C

## 2021-08-24 ENCOUNTER — VIRTUAL VISIT (OUTPATIENT)
Dept: FAMILY MEDICINE CLINIC | Age: 41
End: 2021-08-24
Payer: MEDICARE

## 2021-08-24 DIAGNOSIS — I10 ESSENTIAL HYPERTENSION: ICD-10-CM

## 2021-08-24 DIAGNOSIS — E11.65 TYPE 2 DIABETES MELLITUS WITH HYPERGLYCEMIA, WITHOUT LONG-TERM CURRENT USE OF INSULIN (HCC): Primary | ICD-10-CM

## 2021-08-24 PROCEDURE — G8756 NO BP MEASURE DOC: HCPCS | Performed by: NURSE PRACTITIONER

## 2021-08-24 PROCEDURE — G8427 DOCREV CUR MEDS BY ELIG CLIN: HCPCS | Performed by: NURSE PRACTITIONER

## 2021-08-24 PROCEDURE — 3046F HEMOGLOBIN A1C LEVEL >9.0%: CPT | Performed by: NURSE PRACTITIONER

## 2021-08-24 PROCEDURE — 99214 OFFICE O/P EST MOD 30 MIN: CPT | Performed by: NURSE PRACTITIONER

## 2021-08-24 PROCEDURE — G9717 DOC PT DX DEP/BP F/U NT REQ: HCPCS | Performed by: NURSE PRACTITIONER

## 2021-08-24 PROCEDURE — 2022F DILAT RTA XM EVC RTNOPTHY: CPT | Performed by: NURSE PRACTITIONER

## 2021-08-24 RX ORDER — INSULIN GLARGINE 100 [IU]/ML
10 INJECTION, SOLUTION SUBCUTANEOUS
Qty: 3 ML | Refills: 3 | Status: SHIPPED | OUTPATIENT
Start: 2021-08-24 | End: 2021-09-22 | Stop reason: SDUPTHER

## 2021-08-24 RX ORDER — PEN NEEDLE, DIABETIC 31 GX3/16"
NEEDLE, DISPOSABLE MISCELLANEOUS
Qty: 30 PEN NEEDLE | Refills: 2 | Status: SHIPPED | OUTPATIENT
Start: 2021-08-24

## 2021-08-24 NOTE — PROGRESS NOTES
Luiz Swenson is a 41 y.o. female who was seen by synchronous (real-time) audio-video technology on 8/24/2021 for Diabetes and ED Follow-up    Assessment & Plan:     Diagnoses and all orders for this visit:    1. Type 2 diabetes mellitus with hyperglycemia, without long-term current use of insulin ([address]    2. Essential hypertension    Other orders  -     insulin glargine (LANTUS,BASAGLAR) 100 unit/mL (3 mL) inpn; 10 Units by SubCUTAneous route nightly. -     Insulin Needles, Disposable, (Comfort EZ Pen Needles) 32 gauge x 5/32\" ndle; To use with lantus solostar      Follow-up and Dispositions    · Return in about 2 weeks (around 9/7/2021) for DM, virtual follow up. Routing History        I spent at least 20 minutes on this visit with this established patient. 712  Subjective:   Patient states she has not been taking metformin due to GI side effects. Reports symptoms did not improve with the XR formulation of metformin. Comments she has nausea and diarrhea. Reports when she has those symptoms she doesn't want to eat. Patient reports the ED provider did send over tradjenda due to patient not being able to afford [facility] [place]. Comments she was informed by the pharmacist medication was out of stock and wouldn't back in stock until possibly today or tomorrow. Comments she did recently resume checking her glucose as previously advised. Glucose reading last night was 456 and fasting glucose this morning was 356. Patient states [facility] will be contacting office for glucometer and supplies approval.    Prior to Admission medications    Medication Sig Start Date End Date Taking? Authorizing Provider   linaGLIPtin (TRADJENTA) 5 mg tablet Take 1 Tablet by mouth daily.   Patient not taking: Reported on 8/22/2021 8/21/21 8/21/22  Aida Carney MD   lancets (True Comfort Lancet) 30 gauge misc Check glucose three times a day  DX Code E11.65 8/20/21   Abby Valerio NP   metFORMIN ER (GLUCOPHAGE XR) 750 mg tablet Take 1 Tablet by mouth two (2) times a day. Patient not taking: Reported on 8/21/2021 8/20/21   María PARKER NP   cephALEXin [facility]) 500 mg capsule Take 1 Capsule by mouth three (3) times daily for 10 days. 8/20/21 8/30/21  María PARKER NP   SITagliptin (JANUVIA) 100 mg tablet Take 1 Tablet by mouth daily. Patient not taking: Reported on 8/21/2021 8/20/21   María PARKER NP   ibuprofen (MOTRIN) 600 mg tablet Take 600 mg by mouth daily. Provider, Historical   levothyroxine (SYNTHROID) 175 mcg tablet Take 1 Tablet by mouth Daily (before breakfast). 7/28/21   María PARKER NP   multivit-minerals/folic acid (MULTIVITAMIN GUMMIES PO) Take  by mouth daily. Provider, Historical   cyclobenzaprine (FLEXERIL) 10 mg tablet Take 1 Tab by mouth three (3) times daily as needed for Muscle Spasm(s). 4/27/21   María PARKER NP   gabapentin (NEURONTIN) 300 mg capsule Take 300 mg by mouth daily. 3/15/21   Christiane Pop MD   Blood-Glucose Meter (True Metrix Air Glucose Meter) monitoring kit Check glucose three times daily Dx Code E11.65 11/2/20   María PARKER NP   glucose blood VI test strips (True Metrix Glucose Test Strip) strip Check glucose three times a day DX Code E11.65 11/2/20   María PARKER NP   lisinopril-hydroCHLOROthiazide (PRINZIDE, ZESTORETIC) 20-12.5 mg per tablet TAKE 1 TABLET BY MOUTH DAILY 10/4/20   María PARKER NP   HYDROcodone-acetaminophen (NORCO) 5-325 mg per tablet TK 1 T PO BID PRN 8/26/20   Provider, Historical   atorvastatin (LIPITOR) 40 mg tablet Take 1 Tab by mouth nightly.  7/6/20   María PARKER NP   docusate sodium (COLACE) 100 mg capsule Take 100 mg by mouth. 9/25/19   Provider, Historical     Patient Active Problem List   Diagnosis Code    Hypertension I5    Hypothyroid E03.9    Hyperglycemia due to type 2 diabetes mellitus ([facility] [address] E11.65    Nicotine dependence, cigarettes, uncomplicated R98.497    Depression F32.9    DDD (degenerative disc disease), lumbar M51.36  Fatigue R53.83    Urgency of micturition R39.15    Encounter for long-term (current) use of medications Z79.899    Hypovitaminosis D E55.9    Prediabetes R73.03    Sacroiliitis (HCC) M46.1     Patient Active Problem List    Diagnosis Date Noted    Sacroiliitis ([facility] 03/24/2021    Prediabetes     Hypertension 06/04/2018    Hypothyroid 06/04/2018    Hyperglycemia due to type 2 diabetes mellitus ([facility] 06/04/2018    Nicotine dependence, cigarettes, uncomplicated 67/91/2937    Depression 06/04/2018    DDD (degenerative disc disease), lumbar 06/04/2018    Fatigue 06/04/2018    Urgency of micturition 06/04/2018    Encounter for long-term (current) use of medications 06/04/2018    Hypovitaminosis D 06/04/2018     Current Outpatient Medications   Medication Sig Dispense Refill    insulin glargine (LANTUS,BASAGLAR) 100 unit/mL (3 mL) inpn 10 Units by SubCUTAneous route nightly. 3 mL 3    Insulin Needles, Disposable, (Comfort EZ Pen Needles) 32 gauge x 5/32\" ndle To use with lantus solostar 30 Pen Needle 2    linaGLIPtin (TRADJENTA) 5 mg tablet Take 1 Tablet by mouth daily. (Patient not taking: Reported on 8/22/2021) 30 Tablet 11    lancets (True Comfort Lancet) 30 gauge misc Check glucose three times a day  DX Code E11.65 300 Lancet 1    metFORMIN ER (GLUCOPHAGE XR) 750 mg tablet Take 1 Tablet by mouth two (2) times a day. (Patient not taking: Reported on 8/21/2021) 180 Tablet 1    SITagliptin (JANUVIA) 100 mg tablet Take 1 Tablet by mouth daily. (Patient not taking: Reported on 8/21/2021) 90 Tablet 0    ibuprofen (MOTRIN) 600 mg tablet Take 600 mg by mouth daily.  levothyroxine (SYNTHROID) 175 mcg tablet Take 1 Tablet by mouth Daily (before breakfast). 90 Tablet 0    multivit-minerals/folic acid (MULTIVITAMIN GUMMIES PO) Take  by mouth daily.  cyclobenzaprine (FLEXERIL) 10 mg tablet Take 1 Tab by mouth three (3) times daily as needed for Muscle Spasm(s).  90 Tab 3    gabapentin (NEURONTIN) 300 mg capsule Take 300 mg by mouth daily.  Blood-Glucose Meter (True Metrix Air Glucose Meter) monitoring kit Check glucose three times daily Dx Code E11.65 1 Kit 0    glucose blood VI test strips (True Metrix Glucose Test Strip) strip Check glucose three times a day DX Code E11.65 300 Strip 1    lisinopril-hydroCHLOROthiazide (PRINZIDE, ZESTORETIC) 20-12.5 mg per tablet TAKE 1 TABLET BY MOUTH DAILY 90 Tab 1    HYDROcodone-acetaminophen (NORCO) 5-325 mg per tablet TK 1 T PO BID PRN      atorvastatin (LIPITOR) 40 mg tablet Take 1 Tab by mouth nightly. 90 Tab 0    docusate sodium (COLACE) 100 mg capsule Take 100 mg by mouth.        Allergies   Allergen Reactions    Tramadol Nausea and Vomiting     Past Medical History:   Diagnosis Date    Back pain     Cervical paraspinal muscle spasm     Cervical radiculopathy     Chronic hip pain     Depression     Diabetes ([address]     History of asthma     History of bronchitis     Hypertension     Hypothyroid     Prediabetes     Sciatica     Scoliosis     Sickle cell trait ([facility] [address]      Past Surgical History:   Procedure Laterality Date    HX CARPAL TUNNEL RELEASE Right     HX HYSTERECTOMY      HX ORTHOPAEDIC      cyst removed right wrist    HX THYROIDECTOMY       Family History   Problem Relation Age of Onset    Asthma Mother     Tuberculosis Mother     Alcohol abuse Mother     Drug Abuse Mother     Diabetes Father     Asthma Father     Heart Disease Father     Sickle Cell Anemia Sister     Depression Brother     Lung Cancer Maternal Grandmother     Hypertension Maternal Grandmother     Colon Cancer Paternal Grandmother     Emphysema Paternal Grandmother     Depression Brother     Depression Brother     Depression Brother      Social History     Tobacco Use    Smoking status: Current Every Day Smoker     Packs/day: 0.50    Smokeless tobacco: Never Used   Substance Use Topics    Alcohol use: No       ROS    Objective: Patient-Reported Vitals 8/24/2021   Patient-Reported Weight 206   Patient-Reported Height 6'1\"   Patient-Reported Pulse -   Patient-Reported Temperature 97.5   Patient-Reported Systolic  -   Patient-Reported Diastolic -      General: alert, cooperative, no distress   Mental  status: normal mood, behavior, speech, dress, motor activity, and thought processes, able to follow commands   HENT: NCAT   Neck: no visualized mass   Resp: no respiratory distress   Neuro: no gross deficits   Skin: no discoloration or lesions of concern on visible areas   Psychiatric: normal affect, consistent with stated mood, no evidence of hallucinations     Additional exam findings: We discussed the expected course, resolution and complications of the diagnosis(es) in detail. Medication risks, benefits, costs, interactions, and alternatives were discussed as indicated. I advised her to contact the office if her condition worsens, changes or fails to improve as anticipated. She expressed understanding with the diagnosis(es) and plan. Derek Dulce Maria, was evaluated through a synchronous (real-time) audio-video encounter. The patient (or guardian if applicable) is aware that this is a billable service. Verbal consent to proceed has been obtained within the past 12 months. The visit was conducted pursuant to the emergency declaration under the [facility], [address] [place] [address] authority and the [employer] General Act. Patient identification was verified, and a caregiver was present when appropriate. The patient was located in a state where the provider was credentialed to provide care.     Mayur Elder NP

## 2021-08-24 NOTE — Clinical Note
DM, virtual follow up in 2 weeks. Patient also needs to schedule with [facility] please. Thanks!

## 2021-08-24 NOTE — PATIENT INSTRUCTIONS

## 2021-09-20 ENCOUNTER — PATIENT MESSAGE (OUTPATIENT)
Dept: FAMILY MEDICINE CLINIC | Age: 41
End: 2021-09-20

## 2021-09-20 DIAGNOSIS — M79.10 MYALGIA: ICD-10-CM

## 2021-09-20 NOTE — TELEPHONE ENCOUNTER
Last Visit: 8/24/21 with VITALY Galindo  Next Appointment: 9/22/21 with VITALY Galindo  Previous Refill Encounter(s): 4/27/21 #90 with 3 refills    Requested Prescriptions     Pending Prescriptions Disp Refills    cyclobenzaprine (FLEXERIL) 10 mg tablet 90 Tablet 3     Sig: Take 1 Tablet by mouth three (3) times daily as needed for Muscle Spasm(s).

## 2021-09-22 ENCOUNTER — TELEPHONE (OUTPATIENT)
Dept: FAMILY MEDICINE CLINIC | Age: 41
End: 2021-09-22

## 2021-09-22 ENCOUNTER — VIRTUAL VISIT (OUTPATIENT)
Dept: FAMILY MEDICINE CLINIC | Age: 41
End: 2021-09-22
Payer: MEDICARE

## 2021-09-22 ENCOUNTER — PATIENT MESSAGE (OUTPATIENT)
Dept: FAMILY MEDICINE CLINIC | Age: 41
End: 2021-09-22

## 2021-09-22 DIAGNOSIS — E11.65 TYPE 2 DIABETES MELLITUS WITH HYPERGLYCEMIA, WITHOUT LONG-TERM CURRENT USE OF INSULIN (HCC): Primary | ICD-10-CM

## 2021-09-22 PROCEDURE — 3046F HEMOGLOBIN A1C LEVEL >9.0%: CPT | Performed by: NURSE PRACTITIONER

## 2021-09-22 PROCEDURE — 99212 OFFICE O/P EST SF 10 MIN: CPT | Performed by: NURSE PRACTITIONER

## 2021-09-22 PROCEDURE — G8427 DOCREV CUR MEDS BY ELIG CLIN: HCPCS | Performed by: NURSE PRACTITIONER

## 2021-09-22 PROCEDURE — G8756 NO BP MEASURE DOC: HCPCS | Performed by: NURSE PRACTITIONER

## 2021-09-22 PROCEDURE — G9717 DOC PT DX DEP/BP F/U NT REQ: HCPCS | Performed by: NURSE PRACTITIONER

## 2021-09-22 PROCEDURE — 2022F DILAT RTA XM EVC RTNOPTHY: CPT | Performed by: NURSE PRACTITIONER

## 2021-09-22 RX ORDER — CYCLOBENZAPRINE HCL 10 MG
10 TABLET ORAL
Qty: 90 TABLET | Refills: 3 | Status: SHIPPED | OUTPATIENT
Start: 2021-09-22 | End: 2022-01-11 | Stop reason: SDUPTHER

## 2021-09-22 RX ORDER — INSULIN GLARGINE 100 [IU]/ML
20 INJECTION, SOLUTION SUBCUTANEOUS
Qty: 15 ML | Refills: 1 | Status: SHIPPED | OUTPATIENT
Start: 2021-09-22 | End: 2022-01-10

## 2021-09-22 NOTE — PROGRESS NOTES
Michelle Mansfield is a 35 y.o. female who was seen by synchronous (real-time) audio-video technology on 9/22/2021 for Diabetes    Assessment & Plan:   Diagnoses and all orders for this visit:    1. Type 2 diabetes mellitus with hyperglycemia, without long-term current use of insulin (HCC)    Other orders  -     insulin glargine (LANTUS,BASAGLAR) 100 unit/mL (3 mL) inpn; 20 Units by SubCUTAneous route nightly. Follow-up and Dispositions    · Return in about 6 weeks (around 11/3/2021) for DM/HTN/HLD, in office follow up. Routing History        I spent at least 15 minutes on this visit with this established patient. 712  Subjective:   Diabetic Review of Systems - medication compliance: compliant all of the time, diet compliance: compliant most of the time, home glucose monitoring: is performed regularly, fasting values range high 100s-low 200s, further ROS: no polyuria or polydipsia, no chest pain, dyspnea or TIA's, no numbness, tingling or pain in extremities. Patient states tradjenta has been working well and doesn't have any side effects however, states medication is expensive. Inquiring if discount card is available. States nocturia has decreased. Comments she also has changed her eating habits and also began drinking zero sugar beverages. 172 this morning    Prior to Admission medications    Medication Sig Start Date End Date Taking? Authorizing Provider   insulin glargine (LANTUS,BASAGLAR) 100 unit/mL (3 mL) inpn 10 Units by SubCUTAneous route nightly. 8/24/21   Nilsa PARKER NP   Insulin Needles, Disposable, (Comfort EZ Pen Needles) 32 gauge x 5/32\" ndle To use with lantus solostar 8/24/21   Nilsa PARKER NP   linaGLIPtin (TRADJENTA) 5 mg tablet Take 1 Tablet by mouth daily.   Patient not taking: Reported on 8/22/2021 8/21/21 8/21/22  Candance Center, MD   lancets (True Comfort Lancet) 30 gauge misc Check glucose three times a day  DX Code E11.65 8/20/21   Evelina Chamorro NP   metFORMIN ER (GLUCOPHAGE XR) 750 mg tablet Take 1 Tablet by mouth two (2) times a day. Patient not taking: Reported on 8/21/2021 8/20/21   Mary PARKER NP   SITagliptin (JANUVIA) 100 mg tablet Take 1 Tablet by mouth daily. Patient not taking: Reported on 8/21/2021 8/20/21   Mary PARKER NP   ibuprofen (MOTRIN) 600 mg tablet Take 600 mg by mouth daily. Provider, Historical   levothyroxine (SYNTHROID) 175 mcg tablet Take 1 Tablet by mouth Daily (before breakfast). 7/28/21   Mary PARKER NP   multivit-minerals/folic acid (MULTIVITAMIN GUMMIES PO) Take  by mouth daily. Provider, Historical   cyclobenzaprine (FLEXERIL) 10 mg tablet Take 1 Tab by mouth three (3) times daily as needed for Muscle Spasm(s). 4/27/21   Mary PARKER NP   gabapentin (NEURONTIN) 300 mg capsule Take 300 mg by mouth daily. 3/15/21   Emily Alvarenga MD   Blood-Glucose Meter (True Metrix Air Glucose Meter) monitoring kit Check glucose three times daily Dx Code E11.65 11/2/20   Mary PARKER NP   glucose blood VI test strips (True Metrix Glucose Test Strip) strip Check glucose three times a day DX Code E11.65 11/2/20   Mary PARKER NP   lisinopril-hydroCHLOROthiazide (PRINZIDE, ZESTORETIC) 20-12.5 mg per tablet TAKE 1 TABLET BY MOUTH DAILY 10/4/20   Mary PARKER NP   HYDROcodone-acetaminophen (NORCO) 5-325 mg per tablet TK 1 T PO BID PRN 8/26/20   Provider, Historical   atorvastatin (LIPITOR) 40 mg tablet Take 1 Tab by mouth nightly.  7/6/20   Mary PARKER NP   docusate sodium (COLACE) 100 mg capsule Take 100 mg by mouth. 9/25/19   Provider, Historical     Patient Active Problem List   Diagnosis Code    Hypertension I5    Hypothyroid E03.9    Hyperglycemia due to type 2 diabetes mellitus ([facility] [address] E11.65    Nicotine dependence, cigarettes, uncomplicated C96.502    Depression F32.9    DDD (degenerative disc disease), lumbar M51.36    Fatigue R53.83    Urgency of micturition R39.15    Encounter for long-term (current) use of medications Z79.899    Hypovitaminosis D E55.9    Prediabetes R73.03    Sacroiliitis ([facility]) M46.1     Patient Active Problem List    Diagnosis Date Noted    Sacroiliitis ([facility] 03/24/2021    Prediabetes     Hypertension 06/04/2018    Hypothyroid 06/04/2018    Hyperglycemia due to type 2 diabetes mellitus ([facility] 06/04/2018    Nicotine dependence, cigarettes, uncomplicated 16/19/0025    Depression 06/04/2018    DDD (degenerative disc disease), lumbar 06/04/2018    Fatigue 06/04/2018    Urgency of micturition 06/04/2018    Encounter for long-term (current) use of medications 06/04/2018    Hypovitaminosis D 06/04/2018     Current Outpatient Medications   Medication Sig Dispense Refill    insulin glargine (LANTUS,BASAGLAR) 100 unit/mL (3 mL) inpn 10 Units by SubCUTAneous route nightly. 3 mL 3    Insulin Needles, Disposable, (Comfort EZ Pen Needles) 32 gauge x 5/32\" ndle To use with lantus solostar 30 Pen Needle 2    linaGLIPtin (TRADJENTA) 5 mg tablet Take 1 Tablet by mouth daily. (Patient not taking: Reported on 8/22/2021) 30 Tablet 11    lancets (True Comfort Lancet) 30 gauge misc Check glucose three times a day  DX Code E11.65 300 Lancet 1    metFORMIN ER (GLUCOPHAGE XR) 750 mg tablet Take 1 Tablet by mouth two (2) times a day. (Patient not taking: Reported on 8/21/2021) 180 Tablet 1    SITagliptin (JANUVIA) 100 mg tablet Take 1 Tablet by mouth daily. (Patient not taking: Reported on 8/21/2021) 90 Tablet 0    ibuprofen (MOTRIN) 600 mg tablet Take 600 mg by mouth daily.  levothyroxine (SYNTHROID) 175 mcg tablet Take 1 Tablet by mouth Daily (before breakfast). 90 Tablet 0    multivit-minerals/folic acid (MULTIVITAMIN GUMMIES PO) Take  by mouth daily.  cyclobenzaprine (FLEXERIL) 10 mg tablet Take 1 Tab by mouth three (3) times daily as needed for Muscle Spasm(s). 90 Tab 3    gabapentin (NEURONTIN) 300 mg capsule Take 300 mg by mouth daily.       Blood-Glucose Meter (True Metrix Air Glucose Meter) monitoring kit Check glucose three times daily Dx Code E11.65 1 Kit 0    glucose blood VI test strips (True Metrix Glucose Test Strip) strip Check glucose three times a day DX Code E11.65 300 Strip 1    lisinopril-hydroCHLOROthiazide (PRINZIDE, ZESTORETIC) 20-12.5 mg per tablet TAKE 1 TABLET BY MOUTH DAILY 90 Tab 1    HYDROcodone-acetaminophen (NORCO) 5-325 mg per tablet TK 1 T PO BID PRN      atorvastatin (LIPITOR) 40 mg tablet Take 1 Tab by mouth nightly. 90 Tab 0    docusate sodium (COLACE) 100 mg capsule Take 100 mg by mouth.        Allergies   Allergen Reactions    Tramadol Nausea and Vomiting     Past Medical History:   Diagnosis Date    Back pain     Cervical paraspinal muscle spasm     Cervical radiculopathy     Chronic hip pain     Depression     Diabetes (HCC)     History of asthma     History of bronchitis     Hypertension     Hypothyroid     Prediabetes     Sciatica     Scoliosis     Sickle cell trait (HCC)      Past Surgical History:   Procedure Laterality Date    HX CARPAL TUNNEL RELEASE Right     HX HYSTERECTOMY      HX ORTHOPAEDIC      cyst removed right wrist    HX THYROIDECTOMY       Family History   Problem Relation Age of Onset    Asthma Mother     Tuberculosis Mother     Alcohol abuse Mother     Drug Abuse Mother     Diabetes Father     Asthma Father     Heart Disease Father     Sickle Cell Anemia Sister     Depression Brother     Lung Cancer Maternal Grandmother     Hypertension Maternal Grandmother     Colon Cancer Paternal Grandmother     Emphysema Paternal Grandmother     Depression Brother     Depression Brother     Depression Brother      Social History     Tobacco Use    Smoking status: Current Every Day Smoker     Packs/day: 0.50    Smokeless tobacco: Never Used   Substance Use Topics    Alcohol use: No       ROS    Objective:     Patient-Reported Vitals 8/24/2021   Patient-Reported Weight 206   Patient-Reported Height 6'1\" Patient-Reported Pulse -   Patient-Reported Temperature 97.5   Patient-Reported Systolic  -   Patient-Reported Diastolic -      General: alert, cooperative, no distress   Mental  status: normal mood, behavior, speech, dress, motor activity, and thought processes, able to follow commands   HENT: NCAT   Neck: no visualized mass   Resp: no respiratory distress   Neuro: no gross deficits   Skin: no discoloration or lesions of concern on visible areas   Psychiatric: normal affect, consistent with stated mood, no evidence of hallucinations     Additional exam findings: We discussed the expected course, resolution and complications of the diagnosis(es) in detail. Medication risks, benefits, costs, interactions, and alternatives were discussed as indicated. I advised her to contact the office if her condition worsens, changes or fails to improve as anticipated. She expressed understanding with the diagnosis(es) and plan. Kana Benson, was evaluated through a synchronous (real-time) audio-video encounter. The patient (or guardian if applicable) is aware that this is a billable service. Verbal consent to proceed has been obtained within the past 12 months. The visit was conducted pursuant to the emergency declaration under the [facility], [address] [place] [address] authority and the [employer] General Act. Patient identification was verified, and a caregiver was present when appropriate. The patient was located in a state where the provider was credentialed to provide care.     Johnny Lewis NP

## 2021-09-22 NOTE — TELEPHONE ENCOUNTER
[facility] is requesting and Rx for the [facility]. Please advise and pend new Rx if appropriate.     Phone #[phone]

## 2021-09-22 NOTE — TELEPHONE ENCOUNTER
Pharmacy Progress Note - Telephone Call    Ms. Darline Chang 35 y.o. was contacted via an outbound telephone call regarding medication costs today. Unable to leave ; will send Lookout message. Thank you,  Harriet Souza.  MEG FelixS

## 2021-09-23 NOTE — TELEPHONE ENCOUNTER
Patient returned call; she will qualify for patient assistance. Applications mailed to patient 9/23/21. Thank you,  Briana Pink. SYD Llanes      For Pharmacy Admin Tracking Only     CPA in place:  Yes   Recommendation Provided To: Patient/Caregiver: 2 via Telephone   Intervention Detail: Patient Access Assistance/Sample Provided   Gap Closed?: No   Intervention Accepted By: Patient/Caregiver: 2   Time Spent (min): 30

## 2021-09-30 ENCOUNTER — PATIENT MESSAGE (OUTPATIENT)
Dept: FAMILY MEDICINE CLINIC | Age: 41
End: 2021-09-30

## 2021-09-30 DIAGNOSIS — I10 ESSENTIAL HYPERTENSION: ICD-10-CM

## 2021-09-30 NOTE — TELEPHONE ENCOUNTER
Last Visit: 9/22/21 with VITALY Mcleod  Next Appointment: 11/3/21 with VITALY Mcleod  Previous Refill Encounter(s): 10/4/20 #90 with 1 refill    Requested Prescriptions     Pending Prescriptions Disp Refills    lisinopril-hydroCHLOROthiazide (PRINZIDE, ZESTORETIC) 20-12.5 mg per tablet 90 Tablet 1     Sig: Take 1 Tablet by mouth daily.

## 2021-09-30 NOTE — TELEPHONE ENCOUNTER
From: William Schaefer  To: Watson Carrel, NP  Sent: 9/30/2021 10:57 AM EDT  Subject: Prescription Question    I need a prescription for my blood pressure medication Lisinopril.  I took the last one today

## 2021-10-04 RX ORDER — LISINOPRIL AND HYDROCHLOROTHIAZIDE 12.5; 2 MG/1; MG/1
1 TABLET ORAL DAILY
Qty: 90 TABLET | Refills: 1 | Status: SHIPPED | OUTPATIENT
Start: 2021-10-04 | End: 2022-07-01 | Stop reason: SDUPTHER

## 2021-10-14 DIAGNOSIS — E03.2 HYPOTHYROIDISM DUE TO NON-MEDICATION EXOGENOUS SUBSTANCES: ICD-10-CM

## 2021-10-20 RX ORDER — LEVOTHYROXINE SODIUM 175 UG/1
TABLET ORAL
Qty: 90 TABLET | Refills: 0 | Status: SHIPPED | OUTPATIENT
Start: 2021-10-20 | End: 2022-01-10

## 2021-10-26 ENCOUNTER — TELEPHONE (OUTPATIENT)
Dept: FAMILY MEDICINE CLINIC | Age: 41
End: 2021-10-26

## 2021-10-26 NOTE — TELEPHONE ENCOUNTER
Pharmacy Progress Note - Telephone Encounter    S/O: Ms. Yeison Richardson 35 y.o. female, referred by Dr. China Boucher NP, was contacted via an outbound telephone call to discuss medication access today. Verified patients identifiers (name & ) per HIPAA policy.     - Notified patient that [facility] did not approve her application for assistance for Tradjenta. Patient will need to apply for Medicaid or Medicare low income subsidy and be denied before [facility] will approve her application.   - For her insulin, [employer] did approve her application for [employer]. Provided patient with the company's phone number so that she could set up her shipment.   - Patient also mentioned that she is considering getting switched over to an insulin pump, and has her visit with Endocrinology scheduled for    - Additionally, she states that she may be moving out of state, so this may alter plans for her insulin pump and other medications as well     A/P:  - Patient to call [employer] to set up shipment for her insulin. Notified her that it will be for Basaglar medication and not Lantus, but to continue with the same dose. She expressed understanding.    - Will hold off on alternative options for Tradjenta at this time since patient is potentially getting switched to an insulin pump in a few weeks   - If she stays in state and gets switched to an insulin pump, will work with her to get patient assistance for short acting insulin if needed  - Will plan to follow up after Endo visit   - Patient endorses understanding to the provided information. All questions answered at this time. There are no discontinued medications. No orders of the defined types were placed in this encounter. Thank you,  Gm Gramajo. SYD Ozuna        For Pharmacy Admin Tracking Only     CPA in place:  Yes   Recommendation Provided To: Patient/Caregiver: 0 via Telephone and Other: 1   Intervention Detail: Patient Access Assistance/Sample Provided   Gap Closed?: No   Intervention Accepted By: Patient/Caregiver: 0 and Other: 1   Time Spent (min): 30

## 2021-11-12 LAB — HBA1C MFR BLD HPLC: 8 %

## 2021-11-16 ENCOUNTER — PATIENT MESSAGE (OUTPATIENT)
Dept: FAMILY MEDICINE CLINIC | Age: 41
End: 2021-11-16

## 2021-11-16 ENCOUNTER — TELEPHONE (OUTPATIENT)
Dept: FAMILY MEDICINE CLINIC | Age: 41
End: 2021-11-16

## 2021-11-16 DIAGNOSIS — I10 ESSENTIAL HYPERTENSION: ICD-10-CM

## 2021-11-16 DIAGNOSIS — E11.65 TYPE 2 DIABETES MELLITUS WITH HYPERGLYCEMIA, WITHOUT LONG-TERM CURRENT USE OF INSULIN (HCC): Primary | ICD-10-CM

## 2021-11-16 DIAGNOSIS — E03.2 HYPOTHYROIDISM DUE TO NON-MEDICATION EXOGENOUS SUBSTANCES: ICD-10-CM

## 2021-11-16 NOTE — TELEPHONE ENCOUNTER
Patient reports issues with low blood sugars since starting basal-bolus insulin regimen. Will slightly decrease sliding scale doses to see if this helps. Patient has already gone back to taking Lantus 20 units once daily. For Arthur Wong in place:  Yes   Recommendation Provided To: Patient/Caregiver: 1 via Trinity Lee Intervention Detail: Dose Adjustment: 1, reason: Therapy De-escalation   Gap Closed?: No   Intervention Accepted By: Patient/Caregiver: 1   Time Spent (min): 15

## 2021-11-16 NOTE — TELEPHONE ENCOUNTER
Need to resend prescription for Basaglar to [employer] patient assistance program to have medication shipped to the provider's office since patient has a PO box. In speaking with patient, she also mentioned that she did establish care with the endocrinologist and is now on basal-bolus insulin regimen. Therefore, will also submit order for Humalog to patient assistance company to see if that can be provided for her as well. Thank you,  Christiano Saini. Liz Todd, [facility]      For Pharmacy Admin Tracking Only     CPA in place:  Yes   Recommendation Provided To: Patient/Caregiver: 1 via Telephone   Intervention Detail: Patient Access Assistance/Sample Provided   Gap Closed?: No   Intervention Accepted By: Patient/Caregiver: 1   Time Spent (min): 45

## 2021-11-23 ENCOUNTER — TELEPHONE (OUTPATIENT)
Dept: FAMILY MEDICINE CLINIC | Age: 41
End: 2021-11-23

## 2021-11-24 NOTE — TELEPHONE ENCOUNTER
Pharmacy Progress Note - Telephone Encounter    S/O: Ms. William Schaefer 35 y.o. female, referred by Dr. Anu Landaverde, [facility], was contacted via an outbound telephone call to discuss diabetes management today. Verified patients identifiers (name & ) per HIPAA policy.     - Patient states that she is still having some issues with low blood sugars down to the 50s overnight  - She states that diet is not always consistent, and this could be playing a role in her varying blood sugars     A/P:  - Instructed patient to decrease Lantus to 18 units. Gave her instructions to decrease Lantus again to 16 units if she drops to 50 again overnight   - Continue reduced sliding scale Humalog regimen for now  - Scheduled patient for in office visit next week to review diet and blood sugars   - Patient endorses understanding to the provided information. All questions answered at this time. There are no discontinued medications. No orders of the defined types were placed in this encounter. Thank you,  Avinash Sampson. SYD Juarez        For Pharmacy Admin Tracking Only     CPA in place:  Yes   Recommendation Provided To: Patient/Caregiver: 2 via Telephone   Intervention Detail: Dose Adjustment: 1, reason: Therapy De-escalation and Scheduled Appointment   Gap Closed?: No   Intervention Accepted By: Patient/Caregiver: 2   Time Spent (min): 30

## 2021-12-03 ENCOUNTER — PATIENT MESSAGE (OUTPATIENT)
Dept: FAMILY MEDICINE CLINIC | Age: 41
End: 2021-12-03

## 2021-12-03 NOTE — TELEPHONE ENCOUNTER
Last Visit: 9/22/21 with VITALY Cox  Next Appointment: 12/21/21 with VITALY Cox  Previous Refill Encounter(s): 7/6/20 #90    Requested Prescriptions     Pending Prescriptions Disp Refills    atorvastatin (LIPITOR) 40 mg tablet 90 Tablet 1     Sig: Take 1 Tablet by mouth nightly.

## 2021-12-09 RX ORDER — ATORVASTATIN CALCIUM 40 MG/1
40 TABLET, FILM COATED ORAL
Qty: 90 TABLET | Refills: 1 | Status: SHIPPED | OUTPATIENT
Start: 2021-12-09 | End: 2022-07-13

## 2022-01-09 DIAGNOSIS — E03.2 HYPOTHYROIDISM DUE TO NON-MEDICATION EXOGENOUS SUBSTANCES: ICD-10-CM

## 2022-01-10 RX ORDER — LEVOTHYROXINE SODIUM 175 UG/1
TABLET ORAL
Qty: 90 TABLET | Refills: 0 | Status: SHIPPED | OUTPATIENT
Start: 2022-01-10 | End: 2022-04-04 | Stop reason: SDUPTHER

## 2022-01-11 DIAGNOSIS — M79.10 MYALGIA: ICD-10-CM

## 2022-01-11 RX ORDER — CYCLOBENZAPRINE HCL 10 MG
10 TABLET ORAL
Qty: 90 TABLET | Refills: 1 | Status: SHIPPED | OUTPATIENT
Start: 2022-01-11 | End: 2022-05-12

## 2022-01-11 NOTE — TELEPHONE ENCOUNTER
Last Visit: 9/22/21 with NP Tawnya Jacob  Next Appointment: no show 12/21/21  Previous Refill Encounter(s): 9/22/21 #90 with 3 refills    Requested Prescriptions     Pending Prescriptions Disp Refills    cyclobenzaprine (FLEXERIL) 10 mg tablet 90 Tablet 3     Sig: Take 1 Tablet by mouth three (3) times daily as needed for Muscle Spasm(s).

## 2022-04-02 DIAGNOSIS — E03.2 HYPOTHYROIDISM DUE TO NON-MEDICATION EXOGENOUS SUBSTANCES: ICD-10-CM

## 2022-04-04 RX ORDER — LEVOTHYROXINE SODIUM 175 UG/1
TABLET ORAL
Qty: 90 TABLET | Refills: 0 | Status: SHIPPED | OUTPATIENT
Start: 2022-04-04

## 2022-05-02 DIAGNOSIS — M79.10 MYALGIA: ICD-10-CM

## 2022-05-06 NOTE — TELEPHONE ENCOUNTER
Last Visit: 9/22/21 with VITALY Gibbons  Next Appointment: no show 12/21/21  Previous Refill Encounter(s): 1/11/22 #90 with 1 refill    Requested Prescriptions     Pending Prescriptions Disp Refills    cyclobenzaprine (FLEXERIL) 10 mg tablet [Pharmacy Med Name: CYCLOBENZAPRINE 10MG TABLETS] 90 Tablet 1     Sig: TAKE 1 TABLET BY MOUTH THREE TIMES DAILY AS NEEDED FOR MUSCLE SPASMS

## 2022-05-12 RX ORDER — CYCLOBENZAPRINE HCL 10 MG
TABLET ORAL
Qty: 90 TABLET | Refills: 0 | Status: SHIPPED | OUTPATIENT
Start: 2022-05-12

## 2022-05-13 ENCOUNTER — PATIENT MESSAGE (OUTPATIENT)
Dept: FAMILY MEDICINE CLINIC | Age: 42
End: 2022-05-13

## 2022-05-13 NOTE — TELEPHONE ENCOUNTER
Attempted call to schedule required appt before further refills will be issued.  No answer, left vm to contact office

## 2022-07-01 DIAGNOSIS — I10 ESSENTIAL HYPERTENSION: ICD-10-CM

## 2022-07-01 NOTE — TELEPHONE ENCOUNTER
Last Visit: 9/22/21 with NP Odilia Sumner  Next Appointment: no show 12/21/21  Previous Refill Encounter(s): 10/4/21 #90 with 1 refill    Requested Prescriptions     Pending Prescriptions Disp Refills    lisinopril-hydroCHLOROthiazide (PRINZIDE, ZESTORETIC) 20-12.5 mg per tablet 90 Tablet 0     Sig: Take 1 Tablet by mouth daily.          For [facility] in place:    Recommendation Provided To:    Intervention Detail: New Rx: 1, reason: Patient Preference and Scheduled Appointment   Gap Closed?:    Intervention Accepted By:   Milan Tejeda Time Spent (min): 5

## 2022-07-06 RX ORDER — LISINOPRIL AND HYDROCHLOROTHIAZIDE 12.5; 2 MG/1; MG/1
1 TABLET ORAL DAILY
Qty: 30 TABLET | Refills: 0 | Status: SHIPPED | OUTPATIENT
Start: 2022-07-06

## 2022-07-07 ENCOUNTER — PATIENT MESSAGE (OUTPATIENT)
Dept: FAMILY MEDICINE CLINIC | Age: 42
End: 2022-07-07

## 2022-07-07 NOTE — TELEPHONE ENCOUNTER
Attempted call to schedule in office appt required before further prescriptions can be placed. No answer, unable to leave vm to contact office due to disconnected number.

## 2022-07-13 RX ORDER — ATORVASTATIN CALCIUM 40 MG/1
40 TABLET, FILM COATED ORAL
Qty: 30 TABLET | Refills: 0 | Status: SHIPPED | OUTPATIENT
Start: 2022-07-13

## 2022-07-13 NOTE — TELEPHONE ENCOUNTER
Please inform patient if she has moved. She will need to establish care with a new provider where she is located. A 30-day supply has been sent to requested pharmacy. Thanks!

## 2022-07-18 ENCOUNTER — PATIENT MESSAGE (OUTPATIENT)
Dept: FAMILY MEDICINE CLINIC | Age: 42
End: 2022-07-18

## 2022-07-18 NOTE — TELEPHONE ENCOUNTER
Attempted call verify if patient resides in the area and if not needs to est care with local provider, otherwise must schedule in office appt required before further prescriptions can be placed.  Only rings busy, unable to leave message to contact office

## 2022-09-20 NOTE — LETTER
9/23/2021 5:10 PM    Ms. [address] [facility]    [facility] are the patient assistance applications for Basaglar (Arpit Winter) and Tradjenta [facility]). As we discussed on the phone, we will be changing your Lantus to [address] (which is the same insulin with a different brand name). For Curly Rajas application: Please complete pages 2-4  For [employer] application: Please complete pages 1-3    Please also submit a copy of your 2020 federal tax return, social security benefit statement, disability income statement or other proof of income document to submit with the application. Return application and documents to the office at your earliest conveience. Please call if you have any questions. [phone].          Sincerely,    Mateusz Palacios PharmD, BCPS room air

## 2024-07-12 ENCOUNTER — OFFICE (OUTPATIENT)
Dept: URBAN - METROPOLITAN AREA CLINIC 18 | Facility: CLINIC | Age: 44
End: 2024-07-12
Payer: MEDICAID

## 2024-07-12 VITALS
HEIGHT: 72 IN | WEIGHT: 200 LBS | DIASTOLIC BLOOD PRESSURE: 84 MMHG | OXYGEN SATURATION: 100 % | HEART RATE: 91 BPM | SYSTOLIC BLOOD PRESSURE: 134 MMHG

## 2024-07-12 DIAGNOSIS — E66.3 OVERWEIGHT: ICD-10-CM

## 2024-07-12 DIAGNOSIS — R19.7 DIARRHEA, UNSPECIFIED: ICD-10-CM

## 2024-07-12 DIAGNOSIS — R19.4 CHANGE IN BOWEL HABIT: ICD-10-CM

## 2024-07-12 PROCEDURE — 99204 OFFICE O/P NEW MOD 45 MIN: CPT | Performed by: INTERNAL MEDICINE

## 2024-08-23 PROBLEM — K63.5 POLYP OF COLON: Status: ACTIVE | Noted: 2024-08-23

## (undated) DEVICE — Device: Brand: MEDEX

## (undated) DEVICE — (D)BNDG ADHESIVE FABRIC 3/4X3 -- DISC BY MFR USE ITEM 357960

## (undated) DEVICE — SYR 10ML LUER LOK 1/5ML GRAD --

## (undated) DEVICE — SET EPI 18GA L3.5IN TUOHY NDL W/ 20GA CLS TIP NYL CATH